# Patient Record
Sex: MALE | Race: WHITE | NOT HISPANIC OR LATINO | Employment: FULL TIME | URBAN - METROPOLITAN AREA
[De-identification: names, ages, dates, MRNs, and addresses within clinical notes are randomized per-mention and may not be internally consistent; named-entity substitution may affect disease eponyms.]

---

## 2022-08-13 ENCOUNTER — HOSPITAL ENCOUNTER (INPATIENT)
Facility: HOSPITAL | Age: 57
LOS: 12 days | Discharge: HOSPICE | DRG: 280 | End: 2022-08-25
Attending: EMERGENCY MEDICINE | Admitting: INTERNAL MEDICINE

## 2022-08-13 DIAGNOSIS — I50.9 ACUTE CONGESTIVE HEART FAILURE, UNSPECIFIED HEART FAILURE TYPE: ICD-10-CM

## 2022-08-13 DIAGNOSIS — A41.9 SEPSIS, DUE TO UNSPECIFIED ORGANISM, UNSPECIFIED WHETHER ACUTE ORGAN DYSFUNCTION PRESENT: ICD-10-CM

## 2022-08-13 DIAGNOSIS — E87.5 HYPERKALEMIA: ICD-10-CM

## 2022-08-13 DIAGNOSIS — I50.30 DIASTOLIC CHF: ICD-10-CM

## 2022-08-13 DIAGNOSIS — I26.99 PULMONARY EMBOLISM, UNSPECIFIED CHRONICITY, UNSPECIFIED PULMONARY EMBOLISM TYPE, UNSPECIFIED WHETHER ACUTE COR PULMONALE PRESENT: ICD-10-CM

## 2022-08-13 DIAGNOSIS — R60.0 BILATERAL LOWER EXTREMITY EDEMA: ICD-10-CM

## 2022-08-13 DIAGNOSIS — I82.409 DVT (DEEP VENOUS THROMBOSIS): ICD-10-CM

## 2022-08-13 DIAGNOSIS — I44.1 2ND DEGREE AV BLOCK: ICD-10-CM

## 2022-08-13 DIAGNOSIS — J18.9 PNEUMONIA OF RIGHT LOWER LOBE DUE TO INFECTIOUS ORGANISM: Primary | ICD-10-CM

## 2022-08-13 DIAGNOSIS — R07.9 CHEST PAIN: ICD-10-CM

## 2022-08-13 DIAGNOSIS — I50.9 CONGESTIVE HEART FAILURE: ICD-10-CM

## 2022-08-13 DIAGNOSIS — I49.3 PVC (PREMATURE VENTRICULAR CONTRACTION): ICD-10-CM

## 2022-08-13 DIAGNOSIS — R60.0 EDEMA OF RIGHT UPPER EXTREMITY: ICD-10-CM

## 2022-08-13 DIAGNOSIS — R60.1 ANASARCA: ICD-10-CM

## 2022-08-13 LAB
ALBUMIN SERPL-MCNC: 2.9 GM/DL (ref 3.5–5)
ALBUMIN/GLOB SERPL: 0.8 RATIO (ref 1.1–2)
ALP SERPL-CCNC: 85 UNIT/L (ref 40–150)
ALT SERPL-CCNC: 23 UNIT/L (ref 0–55)
AMMONIA PLAS-MSCNC: 26.9 UMOL/L (ref 18–72)
AMPHET UR QL SCN: NEGATIVE
APPEARANCE UR: ABNORMAL
APTT PPP: 31.9 SECONDS (ref 23.4–33.9)
AST SERPL-CCNC: 37 UNIT/L (ref 5–34)
BACTERIA #/AREA URNS AUTO: ABNORMAL /HPF
BARBITURATE SCN PRESENT UR: NEGATIVE
BASE EXCESS ARTERIAL: 3 MMOL/L (ref -2–2)
BASOPHILS # BLD AUTO: 0.02 X10(3)/MCL (ref 0–0.2)
BASOPHILS NFR BLD AUTO: 0.1 %
BENZODIAZ UR QL SCN: NEGATIVE
BILIRUB UR QL STRIP.AUTO: NEGATIVE MG/DL
BILIRUBIN DIRECT+TOT PNL SERPL-MCNC: 1.2 MG/DL
BNP BLD-MCNC: 3280.3 PG/ML
BUN SERPL-MCNC: 45 MG/DL (ref 8.4–25.7)
CALCIUM SERPL-MCNC: 8.9 MG/DL (ref 8.4–10.2)
CANNABINOIDS UR QL SCN: NEGATIVE
CHLORIDE SERPL-SCNC: 98 MMOL/L (ref 98–107)
CK SERPL-CCNC: 85 U/L (ref 30–200)
CO2 SERPL-SCNC: 28 MMOL/L (ref 22–29)
COCAINE UR QL SCN: NEGATIVE
COLOR UR AUTO: YELLOW
CREAT SERPL-MCNC: 1.36 MG/DL (ref 0.73–1.18)
D DIMER PPP IA.FEU-MCNC: 3.05 UG/ML FEU (ref 0–0.5)
EOSINOPHIL # BLD AUTO: 0 X10(3)/MCL (ref 0–0.9)
EOSINOPHIL NFR BLD AUTO: 0 %
ERYTHROCYTE [DISTWIDTH] IN BLOOD BY AUTOMATED COUNT: 13.7 % (ref 11.5–17)
ETHANOL SERPL-MCNC: <10 MG/DL
FLUAV AG UPPER RESP QL IA.RAPID: NOT DETECTED
FLUBV AG UPPER RESP QL IA.RAPID: NOT DETECTED
GFR SERPLBLD CREATININE-BSD FMLA CKD-EPI: 60 MLS/MIN/1.73/M2
GLOBULIN SER-MCNC: 3.8 GM/DL (ref 2.4–3.5)
GLUCOSE SERPL-MCNC: 136 MG/DL (ref 74–100)
GLUCOSE UR QL STRIP.AUTO: NEGATIVE MG/DL
HCO3 ARTERIAL: 29.4 MMOL/L (ref 18–23)
HCO3 UR-SCNC: 29.4 MMOL/L (ref 24–28)
HCT VFR BLD AUTO: 54.9 % (ref 42–52)
HGB BLD-MCNC: 17.6 GM/DL (ref 14–18)
HGB BLD-MCNC: ABNORMAL G/DL
HYALINE CASTS URNS QL MICRO: ABNORMAL /HPF
IMM GRANULOCYTES # BLD AUTO: 0.06 X10(3)/MCL (ref 0–0.04)
IMM GRANULOCYTES NFR BLD AUTO: 0.4 %
KETONES UR QL STRIP.AUTO: NEGATIVE MG/DL
LACTATE SERPL-SCNC: 3.6 MMOL/L (ref 0.5–2.2)
LEUKOCYTE ESTERASE UR QL STRIP.AUTO: NEGATIVE UNIT/L
LIPASE SERPL-CCNC: 17 U/L
LYMPHOCYTES # BLD AUTO: 1.58 X10(3)/MCL (ref 0.6–4.6)
LYMPHOCYTES NFR BLD AUTO: 10.7 %
MAGNESIUM SERPL-MCNC: 1.8 MG/DL (ref 1.6–2.6)
MCH RBC QN AUTO: 30.9 PG (ref 27–31)
MCHC RBC AUTO-ENTMCNC: 32.1 MG/DL (ref 33–36)
MCV RBC AUTO: 96.5 FL (ref 80–94)
MDMA UR QL SCN: NEGATIVE
MONOCYTES # BLD AUTO: 0.64 X10(3)/MCL (ref 0.1–1.3)
MONOCYTES NFR BLD AUTO: 4.4 %
NEUTROPHILS # BLD AUTO: 12.4 X10(3)/MCL (ref 2.1–9.2)
NEUTROPHILS NFR BLD AUTO: 84.4 %
NITRITE UR QL STRIP.AUTO: NEGATIVE
NRBC BLD AUTO-RTO: 0.1 %
OPIATES UR QL SCN: NEGATIVE
PCO2 BLDA: 59.6 MMHG (ref 35–45)
PCO2 BLDA: 59.6 MM[HG]
PCO2 BLDA: ABNORMAL MM[HG]
PCP UR QL: NEGATIVE
PH SMN: 7.3 [PH]
PH SMN: 7.3 [PH] (ref 7.35–7.45)
PH UR STRIP.AUTO: 5.5 [PH]
PH UR: 5.5 [PH] (ref 3–11)
PLATELET # BLD AUTO: 195 X10(3)/MCL (ref 130–400)
PMV BLD AUTO: 11.2 FL (ref 7.4–10.4)
PO2 BLDA: 94 MMHG (ref 80–100)
PO2 BLDA: 94 MM[HG]
POC BE: 3 MMOL/L
POC COHB: ABNORMAL
POC IONIZED CALCIUM: ABNORMAL
POC METHB: ABNORMAL
POC O2HB: ABNORMAL
POC SATURATED O2: 96 % (ref 95–100)
POC TCO2: 31 MMOL/L (ref 23–27)
POTASSIUM BLD-SCNC: ABNORMAL MMOL/L
POTASSIUM SERPL-SCNC: 6.1 MMOL/L (ref 3.5–5.1)
PROT SERPL-MCNC: 6.7 GM/DL (ref 6.4–8.3)
PROT UR QL STRIP.AUTO: 100 MG/DL
RBC # BLD AUTO: 5.69 X10(6)/MCL (ref 4.7–6.1)
RBC #/AREA URNS AUTO: ABNORMAL /HPF
RBC UR QL AUTO: ABNORMAL UNIT/L
SAMPLE: ABNORMAL
SARS-COV-2 RNA RESP QL NAA+PROBE: NOT DETECTED
SATURATED O2 ARTERIAL, I-STAT: 96
SODIUM BLD-SCNC: ABNORMAL MMOL/L
SODIUM SERPL-SCNC: 140 MMOL/L (ref 136–145)
SP GR UR STRIP.AUTO: 1.02
SPECIFIC GRAVITY, URINE AUTO (.000) (OHS): 1.02 (ref 1–1.03)
SPERM URNS QL MICRO: ABNORMAL /HPF
SQUAMOUS #/AREA URNS AUTO: ABNORMAL /HPF
TROPONIN I SERPL-MCNC: 0.13 NG/ML (ref 0–0.04)
TSH SERPL-ACNC: 4.41 UIU/ML (ref 0.35–4.94)
UROBILINOGEN UR STRIP-ACNC: 0.2 MG/DL
WBC # SPEC AUTO: 14.7 X10(3)/MCL (ref 4.5–11.5)
WBC #/AREA URNS AUTO: ABNORMAL /HPF

## 2022-08-13 PROCEDURE — 25000003 PHARM REV CODE 250: Performed by: EMERGENCY MEDICINE

## 2022-08-13 PROCEDURE — 82803 BLOOD GASES ANY COMBINATION: CPT

## 2022-08-13 PROCEDURE — 99900035 HC TECH TIME PER 15 MIN (STAT)

## 2022-08-13 PROCEDURE — 25000003 PHARM REV CODE 250: Performed by: INTERNAL MEDICINE

## 2022-08-13 PROCEDURE — 85730 THROMBOPLASTIN TIME PARTIAL: CPT | Performed by: EMERGENCY MEDICINE

## 2022-08-13 PROCEDURE — 25500020 PHARM REV CODE 255: Performed by: EMERGENCY MEDICINE

## 2022-08-13 PROCEDURE — 21400001 HC TELEMETRY ROOM

## 2022-08-13 PROCEDURE — 85379 FIBRIN DEGRADATION QUANT: CPT | Performed by: EMERGENCY MEDICINE

## 2022-08-13 PROCEDURE — 82140 ASSAY OF AMMONIA: CPT | Performed by: EMERGENCY MEDICINE

## 2022-08-13 PROCEDURE — 11000001 HC ACUTE MED/SURG PRIVATE ROOM

## 2022-08-13 PROCEDURE — 93010 EKG 12-LEAD: ICD-10-PCS | Mod: ,,, | Performed by: INTERNAL MEDICINE

## 2022-08-13 PROCEDURE — 63600175 PHARM REV CODE 636 W HCPCS: Performed by: INTERNAL MEDICINE

## 2022-08-13 PROCEDURE — 99285 EMERGENCY DEPT VISIT HI MDM: CPT | Mod: 25

## 2022-08-13 PROCEDURE — 85025 COMPLETE CBC W/AUTO DIFF WBC: CPT | Performed by: EMERGENCY MEDICINE

## 2022-08-13 PROCEDURE — 87636 SARSCOV2 & INF A&B AMP PRB: CPT | Performed by: EMERGENCY MEDICINE

## 2022-08-13 PROCEDURE — 93010 ELECTROCARDIOGRAM REPORT: CPT | Mod: ,,, | Performed by: INTERNAL MEDICINE

## 2022-08-13 PROCEDURE — 63600175 PHARM REV CODE 636 W HCPCS: Performed by: EMERGENCY MEDICINE

## 2022-08-13 PROCEDURE — 83880 ASSAY OF NATRIURETIC PEPTIDE: CPT | Performed by: EMERGENCY MEDICINE

## 2022-08-13 PROCEDURE — 36415 COLL VENOUS BLD VENIPUNCTURE: CPT | Performed by: EMERGENCY MEDICINE

## 2022-08-13 PROCEDURE — 81001 URINALYSIS AUTO W/SCOPE: CPT | Performed by: EMERGENCY MEDICINE

## 2022-08-13 PROCEDURE — 84443 ASSAY THYROID STIM HORMONE: CPT | Performed by: EMERGENCY MEDICINE

## 2022-08-13 PROCEDURE — 80307 DRUG TEST PRSMV CHEM ANLYZR: CPT | Performed by: EMERGENCY MEDICINE

## 2022-08-13 PROCEDURE — 82077 ASSAY SPEC XCP UR&BREATH IA: CPT | Performed by: EMERGENCY MEDICINE

## 2022-08-13 PROCEDURE — 93005 ELECTROCARDIOGRAM TRACING: CPT

## 2022-08-13 PROCEDURE — 94761 N-INVAS EAR/PLS OXIMETRY MLT: CPT

## 2022-08-13 PROCEDURE — 25000242 PHARM REV CODE 250 ALT 637 W/ HCPCS: Performed by: EMERGENCY MEDICINE

## 2022-08-13 PROCEDURE — 84484 ASSAY OF TROPONIN QUANT: CPT | Performed by: EMERGENCY MEDICINE

## 2022-08-13 PROCEDURE — 27000221 HC OXYGEN, UP TO 24 HOURS

## 2022-08-13 PROCEDURE — 87040 BLOOD CULTURE FOR BACTERIA: CPT | Performed by: EMERGENCY MEDICINE

## 2022-08-13 PROCEDURE — 82550 ASSAY OF CK (CPK): CPT | Performed by: EMERGENCY MEDICINE

## 2022-08-13 PROCEDURE — 83690 ASSAY OF LIPASE: CPT | Performed by: EMERGENCY MEDICINE

## 2022-08-13 PROCEDURE — 83735 ASSAY OF MAGNESIUM: CPT | Performed by: EMERGENCY MEDICINE

## 2022-08-13 PROCEDURE — 94640 AIRWAY INHALATION TREATMENT: CPT

## 2022-08-13 PROCEDURE — 36600 WITHDRAWAL OF ARTERIAL BLOOD: CPT

## 2022-08-13 PROCEDURE — 83605 ASSAY OF LACTIC ACID: CPT | Performed by: EMERGENCY MEDICINE

## 2022-08-13 PROCEDURE — 80053 COMPREHEN METABOLIC PANEL: CPT | Performed by: EMERGENCY MEDICINE

## 2022-08-13 RX ORDER — IPRATROPIUM BROMIDE AND ALBUTEROL SULFATE 2.5; .5 MG/3ML; MG/3ML
3 SOLUTION RESPIRATORY (INHALATION) EVERY 4 HOURS PRN
Status: DISCONTINUED | OUTPATIENT
Start: 2022-08-13 | End: 2022-08-26 | Stop reason: HOSPADM

## 2022-08-13 RX ORDER — FAMOTIDINE 20 MG/1
20 TABLET, FILM COATED ORAL 2 TIMES DAILY
Status: DISCONTINUED | OUTPATIENT
Start: 2022-08-13 | End: 2022-08-26 | Stop reason: HOSPADM

## 2022-08-13 RX ORDER — SODIUM CHLORIDE 0.9 % (FLUSH) 0.9 %
10 SYRINGE (ML) INJECTION
Status: DISCONTINUED | OUTPATIENT
Start: 2022-08-13 | End: 2022-08-26 | Stop reason: HOSPADM

## 2022-08-13 RX ORDER — FUROSEMIDE 10 MG/ML
20 INJECTION INTRAMUSCULAR; INTRAVENOUS EVERY 6 HOURS
Status: DISCONTINUED | OUTPATIENT
Start: 2022-08-13 | End: 2022-08-17

## 2022-08-13 RX ORDER — ENOXAPARIN SODIUM 100 MG/ML
40 INJECTION SUBCUTANEOUS EVERY 24 HOURS
Status: DISCONTINUED | OUTPATIENT
Start: 2022-08-13 | End: 2022-08-13

## 2022-08-13 RX ORDER — ENOXAPARIN SODIUM 100 MG/ML
60 INJECTION SUBCUTANEOUS
Status: DISCONTINUED | OUTPATIENT
Start: 2022-08-13 | End: 2022-08-14

## 2022-08-13 RX ORDER — SODIUM CHLORIDE 9 MG/ML
1000 INJECTION, SOLUTION INTRAVENOUS
Status: DISCONTINUED | OUTPATIENT
Start: 2022-08-13 | End: 2022-08-13

## 2022-08-13 RX ORDER — ASPIRIN 325 MG
325 TABLET, DELAYED RELEASE (ENTERIC COATED) ORAL DAILY
Status: DISCONTINUED | OUTPATIENT
Start: 2022-08-14 | End: 2022-08-15

## 2022-08-13 RX ORDER — SODIUM CHLORIDE 9 MG/ML
1000 INJECTION, SOLUTION INTRAVENOUS
Status: COMPLETED | OUTPATIENT
Start: 2022-08-13 | End: 2022-08-13

## 2022-08-13 RX ORDER — ASPIRIN 325 MG
325 TABLET, DELAYED RELEASE (ENTERIC COATED) ORAL
Status: COMPLETED | OUTPATIENT
Start: 2022-08-13 | End: 2022-08-13

## 2022-08-13 RX ORDER — FUROSEMIDE 10 MG/ML
20 INJECTION INTRAMUSCULAR; INTRAVENOUS EVERY 8 HOURS
Status: DISCONTINUED | OUTPATIENT
Start: 2022-08-13 | End: 2022-08-13

## 2022-08-13 RX ORDER — LEVOFLOXACIN 5 MG/ML
750 INJECTION, SOLUTION INTRAVENOUS
Status: DISCONTINUED | OUTPATIENT
Start: 2022-08-13 | End: 2022-08-15

## 2022-08-13 RX ORDER — IPRATROPIUM BROMIDE AND ALBUTEROL SULFATE 2.5; .5 MG/3ML; MG/3ML
3 SOLUTION RESPIRATORY (INHALATION)
Status: COMPLETED | OUTPATIENT
Start: 2022-08-13 | End: 2022-08-13

## 2022-08-13 RX ORDER — TALC
6 POWDER (GRAM) TOPICAL NIGHTLY PRN
Status: DISCONTINUED | OUTPATIENT
Start: 2022-08-13 | End: 2022-08-26 | Stop reason: HOSPADM

## 2022-08-13 RX ORDER — FUROSEMIDE 10 MG/ML
20 INJECTION INTRAMUSCULAR; INTRAVENOUS
Status: COMPLETED | OUTPATIENT
Start: 2022-08-13 | End: 2022-08-13

## 2022-08-13 RX ORDER — PROMETHAZINE HYDROCHLORIDE 25 MG/1
25 TABLET ORAL EVERY 6 HOURS PRN
Status: DISCONTINUED | OUTPATIENT
Start: 2022-08-13 | End: 2022-08-26 | Stop reason: HOSPADM

## 2022-08-13 RX ORDER — ACETAMINOPHEN 325 MG/1
650 TABLET ORAL EVERY 4 HOURS PRN
Status: DISCONTINUED | OUTPATIENT
Start: 2022-08-13 | End: 2022-08-26 | Stop reason: HOSPADM

## 2022-08-13 RX ADMIN — LEVOFLOXACIN 750 MG: 750 INJECTION, SOLUTION INTRAVENOUS at 02:08

## 2022-08-13 RX ADMIN — FAMOTIDINE 20 MG: 20 TABLET, FILM COATED ORAL at 08:08

## 2022-08-13 RX ADMIN — FUROSEMIDE 20 MG: 10 INJECTION, SOLUTION INTRAMUSCULAR; INTRAVENOUS at 02:08

## 2022-08-13 RX ADMIN — FUROSEMIDE 20 MG: 10 INJECTION, SOLUTION INTRAMUSCULAR; INTRAVENOUS at 06:08

## 2022-08-13 RX ADMIN — SODIUM POLYSTYRENE SULFONATE 15 G: 15 SUSPENSION ORAL; RECTAL at 02:08

## 2022-08-13 RX ADMIN — SODIUM CHLORIDE 1000 ML: 9 INJECTION, SOLUTION INTRAVENOUS at 01:08

## 2022-08-13 RX ADMIN — IPRATROPIUM BROMIDE AND ALBUTEROL SULFATE 3 ML: 2.5; .5 SOLUTION RESPIRATORY (INHALATION) at 01:08

## 2022-08-13 RX ADMIN — IOPAMIDOL 100 ML: 755 INJECTION, SOLUTION INTRAVENOUS at 03:08

## 2022-08-13 RX ADMIN — ENOXAPARIN SODIUM 60 MG: 100 INJECTION SUBCUTANEOUS at 04:08

## 2022-08-13 RX ADMIN — ASPIRIN 325 MG: 325 TABLET, COATED ORAL at 02:08

## 2022-08-13 NOTE — ED PROVIDER NOTES
Encounter Date: 2022       History     Chief Complaint   Patient presents with    Shortness of Breath     Pt to er via aasi with c/o sob and swelling to extremities.     56-year-old male with a history of COPD complains of shortness of breath and swelling to right upper extremity and bilateral lower extremities for the last 3 days. He also has scrotal and abdominal swelling.  He states that he sleeps on his right side and thought that may be why he is so swollen on the right. He has profuse diarrhea and loses control of bowels and urine if he moves around.  Paramedics reported O2 sat of 88% on room air with respiratory distress on arrival, now on BiPAP with O2 sat 96%.  He has no chest pain or abdominal pain.  He reports early satiety and weight loss.  He states he was a heavy drinker and heavy smoker and quit 2 months ago. No PCP.        Review of patient's allergies indicates:  No Known Allergies  Past Medical History:   Diagnosis Date    COPD (chronic obstructive pulmonary disease)      Past Surgical History:   Procedure Laterality Date    MANDIBLE FRACTURE SURGERY       Family History   Problem Relation Age of Onset    Cancer Mother     Cancer Brother      Social History     Tobacco Use    Smoking status: Former Smoker     Packs/day: 2.00     Quit date: 2022     Years since quittin.1    Smokeless tobacco: Never Used   Substance Use Topics    Alcohol use: Not Currently     Comment: quit 2 months ago    Drug use: Never     Review of Systems   Constitutional: Positive for appetite change.   Respiratory: Positive for shortness of breath and wheezing.    Gastrointestinal: Positive for diarrhea.   Genitourinary: Positive for scrotal swelling.   Musculoskeletal:        Extremity swelling, severe right upper extremity and right lower extremity, moderate left lower extremity, pitting edema noted.   All other systems reviewed and are negative.      Physical Exam     Initial Vitals   BP Pulse  Resp Temp SpO2   08/13/22 1236 08/13/22 1236 08/13/22 1236 08/13/22 1232 08/13/22 1236   (!) 138/98 105 (!) 25 97.2 °F (36.2 °C) 95 %      MAP       --                Physical Exam    Nursing note and vitals reviewed.  Constitutional: Vital signs are normal.   Chronically ill-appearing, cachectic, moderate respiratory distress on arrival on BiPAP   HENT:   Head: Normocephalic and atraumatic.   Eyes: Pupils are equal, round, and reactive to light.   Neck: Neck supple.   Cardiovascular: Normal rate, regular rhythm and normal heart sounds.   Pulmonary/Chest: He is in respiratory distress.   Decreased breath sounds bilaterally but no wheezing   Abdominal:   Soft, mildly distended, no tenderness, rebound, guarding.   Genitourinary:    Genitourinary Comments: Scrotal swelling noted     Musculoskeletal:      Cervical back: Neck supple. No edema or erythema.      Comments: Extensive swelling noted to the right upper extremity which is pitting edema, and pinning edema to bilateral lower extremities right greater than left.     Lymphadenopathy:     He has no cervical adenopathy.   Neurological: He is alert.   Skin: Skin is warm and dry. Capillary refill takes less than 2 seconds.         ED Course   Procedures  Labs Reviewed   B-TYPE NATRIURETIC PEPTIDE - Abnormal; Notable for the following components:       Result Value    Natriuretic Peptide 3,280.3 (*)     All other components within normal limits   CBC WITH DIFFERENTIAL - Abnormal; Notable for the following components:    WBC 14.7 (*)     Hct 54.9 (*)     MCV 96.5 (*)     MCHC 32.1 (*)     MPV 11.2 (*)     Neut # 12.4 (*)     IG# 0.06 (*)     All other components within normal limits   TROPONIN I - Abnormal; Notable for the following components:    Troponin-I 0.134 (*)     All other components within normal limits   LACTIC ACID, PLASMA - Abnormal; Notable for the following components:    Lactic Acid Level 3.6 (*)     All other components within normal limits    COMPREHENSIVE METABOLIC PANEL - Abnormal; Notable for the following components:    Potassium Level 6.1 (*)     Glucose Level 136 (*)     Blood Urea Nitrogen 45.0 (*)     Creatinine 1.36 (*)     Albumin Level 2.9 (*)     Globulin 3.8 (*)     Albumin/Globulin Ratio 0.8 (*)     Aspartate Aminotransferase 37 (*)     All other components within normal limits   URINALYSIS, REFLEX TO URINE CULTURE - Abnormal; Notable for the following components:    Appearance, UA SL CLOUDY (*)     Protein,   (*)     Blood, UA Moderate (*)     All other components within normal limits   D DIMER, QUANTITATIVE - Abnormal; Notable for the following components:    D-Dimer 3.05 (*)     All other components within normal limits   URINALYSIS, MICROSCOPIC - Abnormal; Notable for the following components:    Bacteria, UA Few (*)     Hyaline Casts, UA Few (*)     Sperm, UA Few (*)     WBC, UA 11-20 (*)     All other components within normal limits   ISTAT PROCEDURE - Abnormal; Notable for the following components:    POC PH 7.302 (*)     POC PCO2 59.6 (*)     POC HCO3 29.4 (*)     POC TCO2 31 (*)     All other components within normal limits   APTT - Normal   LIPASE - Normal   MAGNESIUM - Normal   COVID/FLU A&B PCR - Normal   ALCOHOL,MEDICAL (ETHANOL) - Normal   TSH - Normal   AMMONIA - Normal   CK - Normal   BLOOD CULTURE OLG   BLOOD CULTURE OLG   CLOSTRIDIUM DIFFICILE TOXIN A AND B, EIA   STOOL CULTURE OLG   CULTURE, URINE   DRUG SCREEN, URINE (BEAKER)   LACTIC ACID, PLASMA   CK-MB     EKG Readings: (Independently Interpreted)   Initial Reading: No STEMI. Rhythm: Sinus Tachycardia. Heart Rate: 113. Ectopy: No Ectopy. Conduction: LBBB. ST Segments: Normal ST Segments. T Waves: Normal. Clinical Impression: Sinus Tachycardia       Imaging Results          CTA Chest Abdomen Non Coronary (Final result)  Result time 08/13/22 15:55:52   Procedure changed from CTA Chest Non-Coronary (PE Study)     Final result by Basilio De Oliveira MD (08/13/22  15:55:52)                 Impression:      Left lower lobe pulmonary thromboembolism is noted.    Large right pleural effusion is present.    Findings of anasarca.    The bladder wall is prominent.  Correlate with urinalysis.    There are cortical defects of the bilateral kidneys likely related to chronic renal disease and scarring, however infarcts are less likely.    The liver appears heterogeneous which may be related to phase of contrast however is cirrhosis is not excluded.  Cardiomegaly is present.  Further evaluation may be obtained with ECHO.    Findings reported to Dr. Little prior to interpretation.      Electronically signed by: Basilio De Oliveira  Date:    08/13/2022  Time:    15:55             Narrative:    EXAMINATION:  CTA CHEST ABDOMEN NON CORONARY (XPD)    CLINICAL HISTORY:  Pulmonary embolism (PE) suspected, positive D-dimer;    TECHNIQUE:  Axial CTA images of the chest, abdomen, and pelvis were obtained With Contrast. Sagittal and coronal reconstructed images were available for review.    Automatic exposure control was utilized to reduce the patient's radiation dose.    DLP = 582    COMPARISON:  No prior images available for comparison.    FINDINGS:  PULMONARY ARTERY: Thrombus is identified in the left lower lobe pulmonary artery.    AORTA: The thoracoabdominal aorta is normal in course and caliber. Scattered atherosclerotic disease is noted.    HEART: The heart is enlarged.  No pericardial effusion.    THYROID GLAND: The thyroid is not enlarged. There are no nodules identified.    AIRWAYS: Trachea is midline and tracheobronchial tree is patent.    LUNGS: Large right effusion with subsegmental atelectatic changes at the right base.  Emphysematous changes throughout the lungs.    THROACIC LYMPH NODES: There is no significant mediastinal, axillary or hilar lymphadenopathy.    HEPATOBILIARY: Somewhat nodular contour of the superior aspect of the liver with some heterogeneity may be related to phase of  contrast versus cirrhosis.  Correlate with patient's history.  The gallbladder is normal.    SPLEEN: Normal    PANCREAS: No focal masses or ductal dilatation.    ADRENALS: No adrenal nodules.    KIDNEYS: No evidence of hydronephrosis.  Bilateral renal cortical perfusional defects likely related to scarring in chronic kidney disease.  Correlate with patient's history.  Less likely infarcts.    ABDOMINAL LYMPHADENOPATHY/RETROPERITONEUM: There is no retroperitoneal lymphadenopathy.    BOWEL: No acute bowel related abnormalities.    PELVIC VISCERA: The bladder wall is somewhat prominent.  Correlate with urinalysis.    PELVIC LYMPH NODES: No lymphadenopathy.    PERITONEUM/ BODY WALL: Diffuse body wall edema with moderate ascites noted.    SKELETAL: No aggressive appearing lytic/blastic lesion. No acute fractures, subluxations or dislocations.                               X-Ray Chest 1 View (Final result)  Result time 08/13/22 12:44:56    Final result by Basilio De Oliveira MD (08/13/22 12:44:56)                 Impression:      Right greater than left pleural effusion with possible right lower lobe opacification.  Underlying infectious process is not excluded.  Recommend continued follow-up.      Electronically signed by: Basilio De Oliveira  Date:    08/13/2022  Time:    12:44             Narrative:    EXAMINATION:  XR CHEST 1 VIEW    CLINICAL HISTORY:  shortness of breath;    TECHNIQUE:  Single view of the chest    COMPARISON:  No prior imaging available for comparison.    FINDINGS:  Right greater than left pleural effusion with possible right lower lobe opacification.  Underlying infectious process is not excluded.  Recommend continued follow-up.                                 Medications   levoFLOXacin 750 mg/150 mL IVPB 750 mg (0 mg Intravenous Stopped 8/13/22 1601)   sodium chloride 0.9% flush 10 mL (has no administration in time range)   sodium chloride 0.9% flush 10 mL (has no administration in time range)    acetaminophen tablet 650 mg (has no administration in time range)   famotidine tablet 20 mg (has no administration in time range)   promethazine tablet 25 mg (has no administration in time range)   aspirin EC tablet 325 mg (has no administration in time range)   melatonin tablet 6 mg (has no administration in time range)   furosemide injection 20 mg (has no administration in time range)   albuterol-ipratropium 2.5 mg-0.5 mg/3 mL nebulizer solution 3 mL (has no administration in time range)   enoxaparin injection 60 mg (60 mg Subcutaneous Given 8/13/22 1600)   albuterol-ipratropium 2.5 mg-0.5 mg/3 mL nebulizer solution 3 mL (3 mLs Nebulization Given 8/13/22 1310)   0.9%  NaCl infusion (0 mLs Intravenous Stopped 8/13/22 1549)   aspirin EC tablet 325 mg (325 mg Oral Given 8/13/22 1431)   furosemide injection 20 mg (20 mg Intravenous Given 8/13/22 1430)   sodium polystyrene 15 gram/60 mL suspension 15 g (15 g Oral Given 8/13/22 1436)   iopamidoL (ISOVUE-370) injection 100 mL (100 mLs Intravenous Given 8/13/22 1535)     Medical Decision Making:   Other:   I discussed test(s) with the performing physician.       <> Summary of the Findings: Case discussed with Milena VIDAL on call for the Hospitalist Service.  Ok to admit to Dr Fry and will ask Dr Barker to see him here.  Case discussed with Dr Barker hospitalist here and he will see the pt in the ER prior to transfer if he has time before bed is assigned.             ED Course as of 08/13/22 1634   Sat Aug 13, 2022   1338 Lactic acid elevated at 3.6.  2 liters of NS ordered and antibiotics ordered.  [SH]   1346 Patient is now on OxyMask at 10 L with O2 sat 92-94%.  He states he is feeling much better and no longer has respiratory distress after receiving of albuterol and Atrovent nebulizer treatment. [SH]   1447 Milena VIDAL has called back requesting that we put Mr. Rodas back on BiPAP due to pH of 7.3 and pCO2 of 59. She is also requesting that we try again to get the CT PE  protocol prior to transfer.  The BiPAP machine cannot go in the room with the CT so we will put him on 100% FiO2 briefly to try to get the CT.  Respiratory will set up the BiPAP in the room and patient is willing to try BiPAP again. He has mild increased work of breathing with clear lungs at this time, O2sat 96% on oxymask at 7 liters. No fatigue or distress and he is very talkative. He was on BiPAP on arrival per [place] and he said it helped him, but he did not want to stay on it because he wanted to talk and could not talk easily with the BiPAP on. [SH]   1541 Respiratory therapist accompanied Mr. Rodas to the CT scanner and he tolerated transfer from the stretcher to the CT bed with no difficulty, did not develop respiratory distress this time when he laid flat for the CT.  RT did not put him on Bipap. On re-evaluation after returning for CT, states he feels fine, mild increased work of breathing and mild tachypnea but he is very talkative, and says he feels well.  He did not need the BiPAP to tolerate the CT scan and is not on BiPAP right now. [SH]   1549 Discussed BiPap issue with Dr Braker and he did admit Hand P and agrees that BiPap is not needed right now since pt tolerated lying flat for Ct scan.   [SH]   1553 Discussed respiratory status with Milena nurse practitioner and also finding of pulmonary embolism on CT scan.  We will give the patient a dose of Lovenox here prior to transfer. []      ED Course User Index  [SH] Yuni Little MD             Clinical Impression:   Final diagnoses:  [R07.9] Chest pain  [J18.9] Pneumonia of right lower lobe due to infectious organism (Primary)  [A41.9] Sepsis, due to unspecified organism, unspecified whether acute organ dysfunction present  [R60.1] Anasarca  [I50.9] Acute congestive heart failure, unspecified heart failure type  [E87.5] Hyperkalemia  [I26.99] Pulmonary embolism, unspecified chronicity, unspecified pulmonary embolism type, unspecified whether  acute cor pulmonale present  [R60.0] Edema of right upper extremity  [R60.0] Bilateral lower extremity edema          ED Disposition Condition    Transfer to Another Facility Stable              Yuni Little MD  08/13/22 1628       Yuni Little MD  08/13/22 1632       Yuni Little MD  08/13/22 [id number]

## 2022-08-13 NOTE — H&P
"[facility] EMERGENCY DEPARTMENT    Hospital Medicine History & Physical Examination       Patient Name: Harpreet Rodas  MRN: [id number]  Patient Class: IP- Inpatient   Admission Date: 8/13/2022   Admitting Physician: ALEIDA Service   Length of Stay: 0  Attending Physician: Reginald Barker MD  Primary Care Provider: Primary Doctor No  Face-to-Face encounter date: 08/13/2022    Code Status: Full Code    Chief Complaint: Shortness of Breath (Pt to er via aasi with c/o sob and swelling to extremities.)          HISTORY OF PRESENT ILLNESS:   Harpreet Rodas is a 56 y.o. male who  has a past medical history of COPD (chronic obstructive pulmonary disease).. The patient presented to [facility] on 8/13/2022 with a primary complaint of dyspnea on exertion, edema and chest pain.     The pt says for about 6 months he has had worsening dyspnea on exertion. Currently, just walking in his living room make him extremely short of breath. He started having swelling of his right arm and both legs 3 days ago. He started having diarrhea at the same time. He has lost weight. He says he gets hungry but feels full after a few bites and the food goes right through him. He came to the ER he began having left side chest pain, "a sticking sensation". He denies associated symptoms it only lasts a short time. No radiation. No known hx of CAD. Denies fever, chills or sweats. He quit smoking and drinking about 2 months ago after his wife had pneumonia and his breathing was worsening. He was drinking 4-5 40oz beers a week prior to quitting. The pt became very short of breath when trying to get into wheelchair for chest CT. However, he was able to tolerate CT and lay flat when transported by bed. He was given lasix prior to my arrival. He says he is feeling better. He is not on home oxygen. Only home med is an inhaler.    PAST MEDICAL HISTORY:     Past Medical History:   Diagnosis Date    COPD (chronic obstructive pulmonary " disease)        PAST SURGICAL HISTORY:     Past Surgical History:   Procedure Laterality Date    MANDIBLE FRACTURE SURGERY         ALLERGIES:   Patient has no known allergies.    FAMILY HISTORY:   family history includes Cancer in his brother and mother.    SOCIAL HISTORY:     Social History     Tobacco Use    Smoking status: Former Smoker     Packs/day: 2.00     Quit date: 2022     Years since quittin.1    Smokeless tobacco: Never Used   Substance Use Topics    Alcohol use: Not Currently     Comment: quit 2 months ago        HOME MEDICATIONS:     Prior to Admission medications    Medication Sig Start Date End Date Taking? Authorizing Provider   albuterol sulfate (INV ALBUTEROL) 90 mcg inhalation Inhale into the lungs as needed. Take one puff by mouth as directed by Physician.    Historical Provider       REVIEW OF SYSTEMS:   Except as documented, all other systems reviewed and negative   Review of Systems   Constitutional: Positive for malaise/fatigue and weight loss.   Respiratory: Positive for shortness of breath. Negative for cough.    Cardiovascular: Positive for chest pain and leg swelling.   Gastrointestinal: Positive for abdominal pain and diarrhea. Negative for blood in stool and melena.   Neurological: Positive for dizziness and weakness.         PHYSICAL EXAM:     VITAL SIGNS: 24 HRS MIN & MAX LAST   Temp  Min: 97.2 °F (36.2 °C)  Max: 98.4 °F (36.9 °C) 98.2 °F (36.8 °C)   BP  Min: 122/96  Max: 138/98 (!) 126/92   Pulse  Min: 102  Max: 105  102   Resp  Min: 25  Max: 36 (!) 28   SpO2  Min: 95 %  Max: 98 % 98 %       General appearance: chronically ill appearing male in no apparent distress.  HENT: Atraumatic head. Moist mucous membranes of oral cavity.  Eyes: Normal extraocular movements.   Neck: Supple. No LAD  Lungs: Wearing oxygen. Clear to auscultation bilaterally. No wheezing present.   Heart: Regular rate and rhythm. S1 and S2 present with no murmurs/gallop/rub. No JVD present. Anasarca  with dependent edema to his chest. 3+ edema RUE and B/L LE, scrotal edema  Abdomen: Soft, non-distended, mild tender in b/l flanks. No rebound tenderness/guarding.   Extremities: No cyanosis, clubbing  Skin: No Rash.   Neuro: Motor and sensory exams grossly intact. Good tone. No focal defecits  Psych/mental status: Appropriate mood and affect. Responds appropriately to questions.     LABS AND IMAGING:     Recent Labs   Lab 08/13/22  1255   WBC 14.7*   RBC 5.69   HGB 17.6   HCT 54.9*   MCV 96.5*   MCH 30.9   MCHC 32.1*   RDW 13.7      MPV 11.2*       Recent Labs   Lab 08/13/22  1247 08/13/22  1247 08/13/22  1255   NA  --   --  140   K  --   --  6.1*   CO2  --   --  28   BUN  --   --  45.0*   CREATININE  --   --  1.36*   CALCIUM  --   --  8.9   PH 7.302 7.302*  --    MG  --   --  1.80   ALBUMIN  --   --  2.9*   ALKPHOS  --   --  85   ALT  --   --  23   AST  --   --  37*   BILITOT  --   --  1.2     Recent Results (from the past 24 hour(s))   POCT ARTERIAL BLOOD GAS Blood Gas    Collection Time: 08/13/22 12:47 PM   Result Value Ref Range    POC PH 7.302     POC PCO2 59.6     POC PO2 94     POC Sodium      POC Potassium      POC Ionized Calcium      POC HEMOGLOBIN      POC O2Hb      POC COHb      POC MetHb      POC PCO2      Base Excess, Arterial 3.0 (A) -2.0 - 2.0 mmol/L    O2 Sat, Art 96     HCO3, Arterial 29.4 (A) 18.0 - 23.0 MMOL/L   ISTAT PROCEDURE    Collection Time: 08/13/22 12:47 PM   Result Value Ref Range    POC PH 7.302 (L) 7.35 - 7.45    POC PCO2 59.6 (HH) 35 - 45 mmHg    POC PO2 94 80 - 100 mmHg    POC HCO3 29.4 (H) 24 - 28 mmol/L    POC BE 3 -2 to 2 mmol/L    POC SATURATED O2 96 95 - 100 %    POC TCO2 31 (H) 23 - 27 mmol/L    Sample ARTERIAL    COVID/FLU A&B PCR    Collection Time: 08/13/22 12:54 PM   Result Value Ref Range    Influenza A PCR Not Detected Not Detected    Influenza B PCR Not Detected Not Detected    SARS-CoV-2 PCR Not Detected Not Detected   BNP    Collection Time: 08/13/22 12:55 PM    Result Value Ref Range    Natriuretic Peptide 3,280.3 (H) <=100.0 pg/mL   CBC with Differential    Collection Time: 08/13/22 12:55 PM   Result Value Ref Range    WBC 14.7 (H) 4.5 - 11.5 x10(3)/mcL    RBC 5.69 4.70 - 6.10 x10(6)/mcL    Hgb 17.6 14.0 - 18.0 gm/dL    Hct 54.9 (H) 42.0 - 52.0 %    MCV 96.5 (H) 80.0 - 94.0 fL    MCH 30.9 27.0 - 31.0 pg    MCHC 32.1 (L) 33.0 - 36.0 mg/dL    RDW 13.7 11.5 - 17.0 %    Platelet 195 130 - 400 x10(3)/mcL    MPV 11.2 (H) 7.4 - 10.4 fL    Neut % 84.4 %    Lymph % 10.7 %    Mono % 4.4 %    Eos % 0.0 %    Basophil % 0.1 %    Lymph # 1.58 0.6 - 4.6 x10(3)/mcL    Neut # 12.4 (H) 2.1 - 9.2 x10(3)/mcL    Mono # 0.64 0.1 - 1.3 x10(3)/mcL    Eos # 0.00 0 - 0.9 x10(3)/mcL    Baso # 0.02 0 - 0.2 x10(3)/mcL    IG# 0.06 (H) 0 - 0.04 x10(3)/mcL    IG% 0.4 %    NRBC% 0.1 %   Troponin I    Collection Time: 08/13/22 12:55 PM   Result Value Ref Range    Troponin-I 0.134 (H) 0.000 - 0.045 ng/mL   APTT    Collection Time: 08/13/22 12:55 PM   Result Value Ref Range    PTT 31.9 23.4 - 33.9 seconds   Lipase    Collection Time: 08/13/22 12:55 PM   Result Value Ref Range    Lipase Level 17 <=60 U/L   Magnesium    Collection Time: 08/13/22 12:55 PM   Result Value Ref Range    Magnesium Level 1.80 1.60 - 2.60 mg/dL   Lactic Acid, Plasma    Collection Time: 08/13/22 12:55 PM   Result Value Ref Range    Lactic Acid Level 3.6 (HH) 0.5 - 2.2 mmol/L   Comprehensive Metabolic Panel    Collection Time: 08/13/22 12:55 PM   Result Value Ref Range    Sodium Level 140 136 - 145 mmol/L    Potassium Level 6.1 (H) 3.5 - 5.1 mmol/L    Chloride 98 98 - 107 mmol/L    Carbon Dioxide 28 22 - 29 mmol/L    Glucose Level 136 (H) 74 - 100 mg/dL    Blood Urea Nitrogen 45.0 (H) 8.4 - 25.7 mg/dL    Creatinine 1.36 (H) 0.73 - 1.18 mg/dL    Calcium Level Total 8.9 8.4 - 10.2 mg/dL    Protein Total 6.7 6.4 - 8.3 gm/dL    Albumin Level 2.9 (L) 3.5 - 5.0 gm/dL    Globulin 3.8 (H) 2.4 - 3.5 gm/dL    Albumin/Globulin Ratio 0.8 (L) 1.1  - 2.0 ratio    Bilirubin Total 1.2 <=1.5 mg/dL    Alkaline Phosphatase 85 40 - 150 unit/L    Alanine Aminotransferase 23 0 - 55 unit/L    Aspartate Aminotransferase 37 (H) 5 - 34 unit/L    eGFR 60 mls/min/1.73/m2   D-Dimer, Quantitative    Collection Time: 08/13/22 12:55 PM   Result Value Ref Range    D-Dimer 3.05 (H) 0.00 - 0.50 ug/mL FEU   Ethanol    Collection Time: 08/13/22 12:55 PM   Result Value Ref Range    Ethanol Level <10.0 <=10.0 mg/dL   TSH    Collection Time: 08/13/22 12:55 PM   Result Value Ref Range    Thyroid Stimulating Hormone 4.4097 0.3500 - 4.9400 uIU/mL   CK    Collection Time: 08/13/22 12:55 PM   Result Value Ref Range    Creatine Kinase 85 30 - 200 U/L   Ammonia    Collection Time: 08/13/22  1:00 PM   Result Value Ref Range    Ammonia Level 26.9 18.0 - 72.0 umol/L       Microbiology Results (last 7 days)     Procedure Component Value Units Date/Time    Clostridium Diff Toxin, A & B, EIA [[id number]]     Order Status: Sent Specimen: Stool     Stool Culture **CANNOT BE ORDERED STAT** [[id number]]     Order Status: Sent Specimen: Stool     Blood Culture [[id number]] Collected: 08/13/22 1314    Order Status: Sent Specimen: Blood from Arm, Left Updated: 08/13/22 1314    Blood Culture [[id number]] Collected: 08/13/22 1314    Order Status: Sent Specimen: Blood from Hand, Left Updated: 08/13/22 1314           X-Ray Chest 1 View  Narrative: EXAMINATION:  XR CHEST 1 VIEW    CLINICAL HISTORY:  shortness of breath;    TECHNIQUE:  Single view of the chest    COMPARISON:  No prior imaging available for comparison.    FINDINGS:  Right greater than left pleural effusion with possible right lower lobe opacification.  Underlying infectious process is not excluded.  Recommend continued follow-up.  Impression: Right greater than left pleural effusion with possible right lower lobe opacification.  Underlying infectious process is not excluded.  Recommend continued follow-up.    Electronically signed by: Basilio  Yennifer  Date:    08/13/2022  Time:    12:44        __________________________________________________________________________  INPATIENT LIST OF MEDICATIONS     Scheduled Meds:   [START ON 8/14/2022] aspirin  325 mg Oral Daily    enoxaparin  40 mg Subcutaneous Daily    famotidine  20 mg Oral BID    furosemide (LASIX) injection  20 mg Intravenous Q6H    levoFLOXacin  750 mg Intravenous Q24H     Continuous Infusions:  PRN Meds:acetaminophen, albuterol-ipratropium, melatonin, promethazine, sodium chloride 0.9%, sodium chloride 0.9%          ASSESSMENT & PLAN:       Anasarca  Chest pain  Right pleural effusion  Suspected RLL pneumonia  Dyspnea on exertion  Diarrhea  B/L LE edema  RUE edema  Hyperkalemia  Lactic acidosis  ANNETTA     Plan  CTA chest to rule out PE and eval pleural effusion vs pneumonia  Follow cardiac enzymes  Cont lasix  Repeat labs in am  Obtain 2d echo  Consider abd imaging  Send stool for cdiff  Cont empiric abx w/ Levaquin  Use bipap PRN  Consult pulmonary to eval for right thoracentesis    Critical care time >30 mins      Reginald Barker MD   08/13/2022

## 2022-08-13 NOTE — Clinical Note
Diagnosis: Pneumonia of right lower lobe due to infectious organism [[id number]]   Admitting Provider:: YOCASTA BULL [[id number]]   Future Attending Provider: YOCASTA BULL [[id number]]   Reason for IP Medical Treatment  (Clinical interventions that can only be accomplished in the IP setting? ) :: Pneumonia   Estimated Length of Stay:: 5+ midnights   I certify that Inpatient services for greater than or equal to 2 midnights are medically necessary:: Yes   Plans for Post-Acute care--if anticipated (pick the single best option):: C. Discharge home with home health services

## 2022-08-14 LAB
ALBUMIN SERPL-MCNC: 2.6 GM/DL (ref 3.5–5)
ALBUMIN/GLOB SERPL: 0.9 RATIO (ref 1.1–2)
ALP SERPL-CCNC: 69 UNIT/L (ref 40–150)
ALT SERPL-CCNC: 21 UNIT/L (ref 0–55)
AST SERPL-CCNC: 25 UNIT/L (ref 5–34)
BILIRUBIN DIRECT+TOT PNL SERPL-MCNC: 0.7 MG/DL
BNP BLD-MCNC: 3691.5 PG/ML
BUN SERPL-MCNC: 43.2 MG/DL (ref 8.4–25.7)
CALCIUM SERPL-MCNC: 8.6 MG/DL (ref 8.4–10.2)
CHLORIDE SERPL-SCNC: 98 MMOL/L (ref 98–107)
CK MB SERPL-MCNC: 4.8 NG/ML
CO2 SERPL-SCNC: 33 MMOL/L (ref 22–29)
CREAT SERPL-MCNC: 1.09 MG/DL (ref 0.73–1.18)
ERYTHROCYTE [DISTWIDTH] IN BLOOD BY AUTOMATED COUNT: 13.6 % (ref 11.5–17)
GFR SERPLBLD CREATININE-BSD FMLA CKD-EPI: >60 MLS/MIN/1.73/M2
GLOBULIN SER-MCNC: 2.8 GM/DL (ref 2.4–3.5)
GLUCOSE SERPL-MCNC: 96 MG/DL (ref 74–100)
HCT VFR BLD AUTO: 51.3 % (ref 42–52)
HGB BLD-MCNC: 15.3 GM/DL (ref 14–18)
LACTATE SERPL-SCNC: 1.6 MMOL/L (ref 0.5–2.2)
MAGNESIUM SERPL-MCNC: 1.5 MG/DL (ref 1.6–2.6)
MCH RBC QN AUTO: 30.8 PG (ref 27–31)
MCHC RBC AUTO-ENTMCNC: 29.8 MG/DL (ref 33–36)
MCV RBC AUTO: 103.2 FL (ref 80–94)
NRBC BLD AUTO-RTO: 0 %
PHOSPHATE SERPL-MCNC: 5.7 MG/DL (ref 2.3–4.7)
PLATELET # BLD AUTO: 153 X10(3)/MCL (ref 130–400)
PMV BLD AUTO: 10.5 FL (ref 7.4–10.4)
POTASSIUM SERPL-SCNC: 5.2 MMOL/L (ref 3.5–5.1)
PROT SERPL-MCNC: 5.4 GM/DL (ref 6.4–8.3)
RBC # BLD AUTO: 4.97 X10(6)/MCL (ref 4.7–6.1)
SODIUM SERPL-SCNC: 139 MMOL/L (ref 136–145)
TROPONIN I SERPL-MCNC: 0.1 NG/ML (ref 0–0.04)
TROPONIN I SERPL-MCNC: 0.12 NG/ML (ref 0–0.04)
WBC # SPEC AUTO: 10.7 X10(3)/MCL (ref 4.5–11.5)

## 2022-08-14 PROCEDURE — 25000003 PHARM REV CODE 250: Performed by: INTERNAL MEDICINE

## 2022-08-14 PROCEDURE — 83735 ASSAY OF MAGNESIUM: CPT | Performed by: INTERNAL MEDICINE

## 2022-08-14 PROCEDURE — 84100 ASSAY OF PHOSPHORUS: CPT | Performed by: INTERNAL MEDICINE

## 2022-08-14 PROCEDURE — 85027 COMPLETE CBC AUTOMATED: CPT | Performed by: INTERNAL MEDICINE

## 2022-08-14 PROCEDURE — 36415 COLL VENOUS BLD VENIPUNCTURE: CPT | Performed by: INTERNAL MEDICINE

## 2022-08-14 PROCEDURE — 93010 ELECTROCARDIOGRAM REPORT: CPT | Mod: ,,, | Performed by: INTERNAL MEDICINE

## 2022-08-14 PROCEDURE — 83605 ASSAY OF LACTIC ACID: CPT | Performed by: INTERNAL MEDICINE

## 2022-08-14 PROCEDURE — 63600175 PHARM REV CODE 636 W HCPCS: Performed by: INTERNAL MEDICINE

## 2022-08-14 PROCEDURE — 11000001 HC ACUTE MED/SURG PRIVATE ROOM

## 2022-08-14 PROCEDURE — 63600175 PHARM REV CODE 636 W HCPCS: Performed by: EMERGENCY MEDICINE

## 2022-08-14 PROCEDURE — 83880 ASSAY OF NATRIURETIC PEPTIDE: CPT | Performed by: INTERNAL MEDICINE

## 2022-08-14 PROCEDURE — 82553 CREATINE MB FRACTION: CPT | Performed by: INTERNAL MEDICINE

## 2022-08-14 PROCEDURE — 21400001 HC TELEMETRY ROOM

## 2022-08-14 PROCEDURE — 93010 EKG 12-LEAD: ICD-10-PCS | Mod: ,,, | Performed by: INTERNAL MEDICINE

## 2022-08-14 PROCEDURE — 80053 COMPREHEN METABOLIC PANEL: CPT | Performed by: INTERNAL MEDICINE

## 2022-08-14 PROCEDURE — 84484 ASSAY OF TROPONIN QUANT: CPT | Performed by: INTERNAL MEDICINE

## 2022-08-14 PROCEDURE — 93005 ELECTROCARDIOGRAM TRACING: CPT

## 2022-08-14 RX ORDER — MAGNESIUM SULFATE HEPTAHYDRATE 40 MG/ML
2 INJECTION, SOLUTION INTRAVENOUS ONCE
Status: COMPLETED | OUTPATIENT
Start: 2022-08-14 | End: 2022-08-14

## 2022-08-14 RX ORDER — ENOXAPARIN SODIUM 100 MG/ML
60 INJECTION SUBCUTANEOUS
Status: DISCONTINUED | OUTPATIENT
Start: 2022-08-14 | End: 2022-08-15

## 2022-08-14 RX ORDER — LANOLIN ALCOHOL/MO/W.PET/CERES
400 CREAM (GRAM) TOPICAL 2 TIMES DAILY
Status: DISCONTINUED | OUTPATIENT
Start: 2022-08-14 | End: 2022-08-16

## 2022-08-14 RX ADMIN — SODIUM ZIRCONIUM CYCLOSILICATE 10 G: 10 POWDER, FOR SUSPENSION ORAL at 08:08

## 2022-08-14 RX ADMIN — MAGNESIUM SULFATE HEPTAHYDRATE 2 G: 40 INJECTION, SOLUTION INTRAVENOUS at 08:08

## 2022-08-14 RX ADMIN — Medication 400 MG: at 01:08

## 2022-08-14 RX ADMIN — Medication 650 MG: at 03:08

## 2022-08-14 RX ADMIN — FUROSEMIDE 20 MG: 10 INJECTION, SOLUTION INTRAMUSCULAR; INTRAVENOUS at 11:08

## 2022-08-14 RX ADMIN — LEVOFLOXACIN 750 MG: 750 INJECTION, SOLUTION INTRAVENOUS at 02:08

## 2022-08-14 RX ADMIN — ENOXAPARIN SODIUM 60 MG: 100 INJECTION SUBCUTANEOUS at 05:08

## 2022-08-14 RX ADMIN — FUROSEMIDE 20 MG: 10 INJECTION, SOLUTION INTRAMUSCULAR; INTRAVENOUS at 05:08

## 2022-08-14 RX ADMIN — ASPIRIN 325 MG: 325 TABLET, COATED ORAL at 08:08

## 2022-08-14 RX ADMIN — FAMOTIDINE 20 MG: 20 TABLET, FILM COATED ORAL at 08:08

## 2022-08-14 RX ADMIN — FAMOTIDINE 20 MG: 20 TABLET, FILM COATED ORAL at 09:08

## 2022-08-14 RX ADMIN — FUROSEMIDE 20 MG: 10 INJECTION, SOLUTION INTRAMUSCULAR; INTRAVENOUS at 12:08

## 2022-08-14 RX ADMIN — Medication 400 MG: at 09:08

## 2022-08-14 NOTE — CONSULTS
[facility] 3rd Floor Medical Telemetry  Pulmonary Critical Care Note    Patient Name: Harpreet Rodas  MRN: [id number]  Admission Date: 2022  Hospital Length of Stay: 1 days  Code Status: Full Code  Attending Provider: Ana Rosa Fry MD  Primary Care Provider: Primary Doctor No     Subjective:     HPI:   This is a 56-year-old male who was admitted to [facility] on 2022 for complaints of worsening shortness of breath which has been ongoing over the last several months left-sided chest pain, and significant swelling of his right arm and bilateral feet.  Patient has history of drinking and smoking quit approximately 2 months ago prior to that was drinking 4-5 40 oz beer a weekly.  Workup revealed significantly elevated BNP of 3691.5, troponin 0.134, and CT imaging of the chest with a left lower lobe pulmonary thrombus, large right pleural effusion, and emphysematous changes throughout the lungs.  Pulmonary is being consulted for consideration of thoracentesis.  Patient was initiated on Lovenox b.i.d. secondary to pulmonary emboli.  Echocardiogram appears to be pending.      Hospital Course/Significant events:      24 Hour Interval History:      Past Medical History:   Diagnosis Date    COPD (chronic obstructive pulmonary disease)        Past Surgical History:   Procedure Laterality Date    MANDIBLE FRACTURE SURGERY         Social History     Socioeconomic History    Marital status:    Tobacco Use    Smoking status: Former Smoker     Packs/day: 2.00     Quit date: 2022     Years since quittin.1    Smokeless tobacco: Never Used   Substance and Sexual Activity    Alcohol use: Not Currently     Comment: quit 2 months ago    Drug use: Never       Current Outpatient Medications   Medication Instructions    albuterol sulfate (INV ALBUTEROL) 90 mcg inhalation Inhalation, As needed (PRN), Take one puff by mouth as directed by Physician.       Current Inpatient Medications   aspirin   325 mg Oral Daily    enoxaparin  60 mg Subcutaneous Q12H    famotidine  20 mg Oral BID    furosemide (LASIX) injection  20 mg Intravenous Q6H    levoFLOXacin  750 mg Intravenous Q24H    magnesium sulfate IVPB  2 g Intravenous Once    sodium zirconium cyclosilicate  10 g Oral Daily       Review of Systems   Constitutional: Positive for weight loss.   HENT: Negative.    Respiratory: Positive for shortness of breath.    Cardiovascular: Positive for orthopnea and leg swelling.   Gastrointestinal: Positive for diarrhea.   Skin: Negative.           Objective:       Intake/Output Summary (Last 24 hours) at 8/14/2022 0846  Last data filed at 8/14/2022 0400  Gross per 24 hour   Intake 1000 ml   Output 1450 ml   Net -450 ml         Vital Signs (Most Recent):  Temp: 97.5 °F (36.4 °C) (08/14/22 0742)  Pulse: 96 (08/14/22 0400)  Resp: (!) 22 (08/14/22 0742)  BP: 115/82 (08/14/22 0742)  SpO2: 100 % (08/14/22 0346)  Body mass index is 19.06 kg/m².  Weight: 62 kg (136 lb 11 oz) Vital Signs (24h Range):  Temp:  [96.6 °F (35.9 °C)-98.4 °F (36.9 °C)] 97.5 °F (36.4 °C)  Pulse:  [] 96  Resp:  [18-36] 22  SpO2:  [95 %-100 %] 100 %  BP: (115-138)/(82-98) 115/82     Physical Exam  HENT:      Head: Normocephalic and atraumatic.   Cardiovascular:      Rate and Rhythm: Normal rate and regular rhythm.      Heart sounds: Normal heart sounds.   Pulmonary:      Comments: Diminished over right side.   Abdominal:      General: Bowel sounds are normal.      Palpations: Abdomen is soft.   Musculoskeletal:      Right lower leg: Edema present.      Left lower leg: Edema present.      Comments: 3+ edema to BLEs; Right arm with significant edema.    Neurological:      General: No focal deficit present.      Mental Status: He is alert and oriented to person, place, and time.   Psychiatric:         Mood and Affect: Mood normal.         Behavior: Behavior normal.       Lines/Drains/Airways     Peripheral Intravenous Line  Duration                 Peripheral IV - Single Lumen 08/13/22 1232 18 G Left Antecubital <1 day                Significant Labs:    Lab Results   Component Value Date    WBC 10.7 08/14/2022    HGB 15.3 08/14/2022    HCT 51.3 08/14/2022    .2 (H) 08/14/2022     08/14/2022   BNP of 3691.5, troponin 0.134      BMP  Lab Results   Component Value Date     08/14/2022    K 5.2 (H) 08/14/2022    CO2 33 (H) 08/14/2022    BUN 43.2 (H) 08/14/2022    CREATININE 1.09 08/14/2022    CALCIUM 8.6 08/14/2022   Magnesium 1.5, phosphorus 5.7    ABG  Recent Labs   Lab 08/13/22  1247   PH 7.302*   PO2 94   PCO2 59.6*   HCO3 29.4*   BE 3       Significant Imaging:  CTA Chest Abdomen Non Coronary  Narrative: EXAMINATION:  CTA CHEST ABDOMEN NON CORONARY (XPD)    CLINICAL HISTORY:  Pulmonary embolism (PE) suspected, positive D-dimer;    TECHNIQUE:  Axial CTA images of the chest, abdomen, and pelvis were obtained With Contrast. Sagittal and coronal reconstructed images were available for review.    Automatic exposure control was utilized to reduce the patient's radiation dose.    DLP = 582    COMPARISON:  No prior images available for comparison.    FINDINGS:  PULMONARY ARTERY: Thrombus is identified in the left lower lobe pulmonary artery.    AORTA: The thoracoabdominal aorta is normal in course and caliber. Scattered atherosclerotic disease is noted.    HEART: The heart is enlarged.  No pericardial effusion.    THYROID GLAND: The thyroid is not enlarged. There are no nodules identified.    AIRWAYS: Trachea is midline and tracheobronchial tree is patent.    LUNGS: Large right effusion with subsegmental atelectatic changes at the right base.  Emphysematous changes throughout the lungs.    THROACIC LYMPH NODES: There is no significant mediastinal, axillary or hilar lymphadenopathy.    HEPATOBILIARY: Somewhat nodular contour of the superior aspect of the liver with some heterogeneity may be related to phase of contrast versus cirrhosis.  Correlate  with patient's history.  The gallbladder is normal.    SPLEEN: Normal    PANCREAS: No focal masses or ductal dilatation.    ADRENALS: No adrenal nodules.    KIDNEYS: No evidence of hydronephrosis.  Bilateral renal cortical perfusional defects likely related to scarring in chronic kidney disease.  Correlate with patient's history.  Less likely infarcts.    ABDOMINAL LYMPHADENOPATHY/RETROPERITONEUM: There is no retroperitoneal lymphadenopathy.    BOWEL: No acute bowel related abnormalities.    PELVIC VISCERA: The bladder wall is somewhat prominent.  Correlate with urinalysis.    PELVIC LYMPH NODES: No lymphadenopathy.    PERITONEUM/ BODY WALL: Diffuse body wall edema with moderate ascites noted.    SKELETAL: No aggressive appearing lytic/blastic lesion. No acute fractures, subluxations or dislocations.  Impression: Left lower lobe pulmonary thromboembolism is noted.    Large right pleural effusion is present.    Findings of anasarca.    The bladder wall is prominent.  Correlate with urinalysis.    There are cortical defects of the bilateral kidneys likely related to chronic renal disease and scarring, however infarcts are less likely.    The liver appears heterogeneous which may be related to phase of contrast however is cirrhosis is not excluded.  Cardiomegaly is present.  Further evaluation may be obtained with ECHO.    Findings reported to Dr. Little prior to interpretation.    Electronically signed by: Basilio De Oliveira  Date:    08/13/2022  Time:    15:55  X-Ray Chest 1 View  Narrative: EXAMINATION:  XR CHEST 1 VIEW    CLINICAL HISTORY:  shortness of breath;    TECHNIQUE:  Single view of the chest    COMPARISON:  No prior imaging available for comparison.    FINDINGS:  Right greater than left pleural effusion with possible right lower lobe opacification.  Underlying infectious process is not excluded.  Recommend continued follow-up.  Impression: Right greater than left pleural effusion with possible right lower lobe  opacification.  Underlying infectious process is not excluded.  Recommend continued follow-up.    Electronically signed by: Basilio De Oliveira  Date:    08/13/2022  Time:    12:44      Assessment/Plan:     Assessment  1. Pulmonary emboli  2. Right-sided pleural effusion with atelectasis on imaging    3.  Elevated BNP with significant edema concerning for volume overload/congestive heart failure  4. History of tobacco and alcohol abuse  5. Imaging with findings of emphysematous changes  6. Complaints of diarrhea on admit C diff pending    Plan  1. Continue lovenox for PE. Dr. Curry will review imaging and decide if patient would benefit from thoracentesis. Lovenox would need to be held for thoracentesis to be done -- defer to MD with rounds.   2. Echo pending will need to follow for results.   3. Continue diuresis as toleated.      Malathi Bennett, ANP  Pulmonary Critical Care Medicine  [facility] - 3rd Floor Medical Telemetry

## 2022-08-14 NOTE — PROGRESS NOTES
"[facility] MEDICINE PROGRESS NOTE      Patient Name: Harpreet Rodas  MRN: [id number]  Admission Date: 8/13/2022 12:32 PM  Status: IP- Inpatient   Length of Stay: 1 days  Face-to-Face encounter date: 08/14/2022       CHIEF COMPLAINT  Shortness of Breath (Pt to er via aasi with c/o sob and swelling to extremities.)    HOSPITAL COURSE  56 y.o. male who  has a past medical history of COPD (chronic obstructive pulmonary disease).. The patient presented to [facility] on 8/13/2022 with a primary complaint of dyspnea on exertion, edema and chest pain.      The pt says for about 6 months he has had worsening dyspnea on exertion. Currently, just walking in his living room make him extremely short of breath. He started having swelling of his right arm and both legs 3 days ago. He started having diarrhea at the same time. He has lost weight. He says he gets hungry but feels full after a few bites and the food goes right through him. He came to the ER he began having left side chest pain, "a sticking sensation". He denies associated symptoms it only lasts a short time. No radiation. No known hx of CAD. Denies fever, chills or sweats. He quit smoking and drinking about 2 months ago after his wife had pneumonia and his breathing was worsening. He was drinking 4-5 40oz beers a week prior to quitting. The pt became very short of breath when trying to get into wheelchair for chest CT. However, he was able to tolerate CT and lay flat when transported by bed. He was given lasix prior to my arrival. He says he is feeling better. He is not on home oxygen. Only home med is an inhaler.    Today (08/14/2022):  He reports he feels much better.  He is currently on 5 L via Oxymizer.  He denies any chest pain. Noted with PVCs and intermittent second degree type 2 blocks on telemetry      OBJECTIVE    VITAL SIGNS: 24 HRS MIN & MAX LAST   Temp  Min: 96.6 °F (35.9 °C)  Max: 98.4 °F (36.9 °C) 97.4 °F (36.3 °C)   BP  " Min: 115/82  Max: 138/98 119/83   Pulse  Min: 92  Max: 105  96   Resp  Min: 18  Max: 36 (Abnormal) 22   SpO2  Min: 95 %  Max: 100 % 100 %       LABS/MICROBIOLOGY/MEDICATIONS/DIAGNOSTICS  I have reviewed all pertinent lab results within the past 24 hours.    Recent Labs   Lab 08/13/22  1255 08/14/22  0658   WBC 14.7* 10.7   RBC 5.69 4.97   HGB 17.6 15.3   HCT 54.9* 51.3   MCV 96.5* 103.2*   MCH 30.9 30.8   MCHC 32.1* 29.8*   RDW 13.7 13.6    153   MPV 11.2* 10.5*       Recent Labs   Lab 08/13/22  1247 08/13/22  1247 08/13/22  1255 08/14/22  0658   NA  --   --  140 139   K  --   --  6.1* 5.2*   CO2  --   --  28 33*   BUN  --   --  45.0* 43.2*   CREATININE  --   --  1.36* 1.09   CALCIUM  --   --  8.9 8.6   PH 7.302 7.302*  --   --    MG  --   --  1.80 1.50*   ALBUMIN  --   --  2.9* 2.6*   ALKPHOS  --   --  85 69   ALT  --   --  23 21   AST  --   --  37* 25   BILITOT  --   --  1.2 0.7       Recent Labs     08/13/22  1255 08/14/22  0658   WBC 14.7* 10.7   RBC 5.69 4.97   HGB 17.6 15.3   HCT 54.9* 51.3   MCV 96.5* 103.2*   MCH 30.9 30.8   MCHC 32.1* 29.8*   RDW 13.7 13.6     Recent Labs     08/13/22  1255 08/14/22  0658   CHLORIDE 98 98   CO2 28 33*   BUN 45.0* 43.2*   CREATININE 1.36* 1.09   GLUCOSE 136* 96   CALCIUM 8.9 8.6   ALBUMIN 2.9* 2.6*   ALKPHOS 85 69   ALT 23 21   AST 37* 25   BNP 3,280.3* 3,691.5*   CPK 85  --    TROPONINI 0.134*  --        Microbiology Results (last 7 days)     Procedure Component Value Units Date/Time    Urine culture [[id number]] Collected: 08/13/22 1254    Order Status: Completed Specimen: Urine, Clean Catch Updated: 08/14/22 0646     Urine Culture No Growth At 24 Hours    Clostridium Diff Toxin, A & B, EIA [[id number]] Collected: 08/13/22 2310    Order Status: Sent Specimen: Stool     Stool Culture **CANNOT BE ORDERED STAT** [[id number]]     Order Status: Sent Specimen: Stool     Blood Culture [[id number]] Collected: 08/13/22 1314    Order Status: Resulted Specimen: Blood from Arm,  Left Updated: 08/13/22 1314    Blood Culture [[id number]] Collected: 08/13/22 1314    Order Status: Resulted Specimen: Blood from Hand, Left Updated: 08/13/22 1314           CTA Chest Abdomen Non Coronary  Narrative: EXAMINATION:  CTA CHEST ABDOMEN NON CORONARY (XPD)    CLINICAL HISTORY:  Pulmonary embolism (PE) suspected, positive D-dimer;    TECHNIQUE:  Axial CTA images of the chest, abdomen, and pelvis were obtained With Contrast. Sagittal and coronal reconstructed images were available for review.    Automatic exposure control was utilized to reduce the patient's radiation dose.    DLP = 582    COMPARISON:  No prior images available for comparison.    FINDINGS:  PULMONARY ARTERY: Thrombus is identified in the left lower lobe pulmonary artery.    AORTA: The thoracoabdominal aorta is normal in course and caliber. Scattered atherosclerotic disease is noted.    HEART: The heart is enlarged.  No pericardial effusion.    THYROID GLAND: The thyroid is not enlarged. There are no nodules identified.    AIRWAYS: Trachea is midline and tracheobronchial tree is patent.    LUNGS: Large right effusion with subsegmental atelectatic changes at the right base.  Emphysematous changes throughout the lungs.    THROACIC LYMPH NODES: There is no significant mediastinal, axillary or hilar lymphadenopathy.    HEPATOBILIARY: Somewhat nodular contour of the superior aspect of the liver with some heterogeneity may be related to phase of contrast versus cirrhosis.  Correlate with patient's history.  The gallbladder is normal.    SPLEEN: Normal    PANCREAS: No focal masses or ductal dilatation.    ADRENALS: No adrenal nodules.    KIDNEYS: No evidence of hydronephrosis.  Bilateral renal cortical perfusional defects likely related to scarring in chronic kidney disease.  Correlate with patient's history.  Less likely infarcts.    ABDOMINAL LYMPHADENOPATHY/RETROPERITONEUM: There is no retroperitoneal lymphadenopathy.    BOWEL: No acute bowel  related abnormalities.    PELVIC VISCERA: The bladder wall is somewhat prominent.  Correlate with urinalysis.    PELVIC LYMPH NODES: No lymphadenopathy.    PERITONEUM/ BODY WALL: Diffuse body wall edema with moderate ascites noted.    SKELETAL: No aggressive appearing lytic/blastic lesion. No acute fractures, subluxations or dislocations.  Impression: Left lower lobe pulmonary thromboembolism is noted.    Large right pleural effusion is present.    Findings of anasarca.    The bladder wall is prominent.  Correlate with urinalysis.    There are cortical defects of the bilateral kidneys likely related to chronic renal disease and scarring, however infarcts are less likely.    The liver appears heterogeneous which may be related to phase of contrast however is cirrhosis is not excluded.  Cardiomegaly is present.  Further evaluation may be obtained with ECHO.    Findings reported to Dr. Little prior to interpretation.    Electronically signed by: Basilio De Oliveira  Date:    08/13/2022  Time:    15:55  X-Ray Chest 1 View  Narrative: EXAMINATION:  XR CHEST 1 VIEW    CLINICAL HISTORY:  shortness of breath;    TECHNIQUE:  Single view of the chest    COMPARISON:  No prior imaging available for comparison.    FINDINGS:  Right greater than left pleural effusion with possible right lower lobe opacification.  Underlying infectious process is not excluded.  Recommend continued follow-up.  Impression: Right greater than left pleural effusion with possible right lower lobe opacification.  Underlying infectious process is not excluded.  Recommend continued follow-up.    Electronically signed by: Basilio De Oliveira  Date:    08/13/2022  Time:    12:44        Scheduled Med:   aspirin  325 mg Oral Daily    enoxaparin  60 mg Subcutaneous Q12H    famotidine  20 mg Oral BID    furosemide (LASIX) injection  20 mg Intravenous Q6H    levoFLOXacin  750 mg Intravenous Q24H    sodium zirconium cyclosilicate  10 g Oral Daily        Continuous  Infusions:       PRN Meds:  acetaminophen, albuterol-ipratropium, melatonin, promethazine, sodium chloride 0.9%, sodium chloride 0.9%       PHYSICAL EXAM  Constitutional: Appears chronically ill looking. Cachectic. No acute distress.   Head/Eyes/Ears/Nose/Mouth/Throat: Normocephalic, atraumatic. PERRLA, EOM intact. Conjunctive clear, sclera white. Pinna no masses, lesions, tenderness, drainage. Pharynx pink.  Neck: No JVD present. Normal alignment and mobility.  Cardiovascular: Regular rate and rhythm, S1 / S2, no systolic or diastolic murmur, no rubs or gallops.  Pulmonary/Chest: Effort normal. No respiratory distress. Breath sounds decreased bilaterally, bilateral rales.   Abdominal: Flat, soft, non-tender, non-distended, no rebound tenderness or guarding, normal bowel sounds in all four quadrants.  Musculoskeletal: Extremities symmetric, no tenderness, weakness, or swelling. Maintains flexion against resistance.   Extremities: Anasarca with dependent edema to his back. 3+ pitting edema right upper extremity and bilateral lower extremity to, + scrotal edema.  Neurological: Alert and oriented to person, place, and time. Cooperative. Speech clear, appropriate. No dysarthria. Cranial nerves II-XII grossly intact. No focal deficits.  Psychiatric: No depression, anxiety or agitation. Normal affect, no hallucinations or suicidal ideations, no pressured speech.      ASSESSMENT  Acute hypercapnic and hypoxic respiratory failure  Left lower lobe acute pulmonary thromboembolism  Large right pleural effusion  Anasarca, volume overload  Suspected acute onset congestive heart failure, unspecified heart failure type, elevated BNP  Hyperkalemia  Mild acute kidney injury  Possible liver cirrhosis  Cardiomegaly  Chest pain  Suspected right lower lobe pneumonia vs.  Atelectasis  Dyspnea on exertion  Diarrhea  Lactic acidosis: Resolved  COPD, emphysema  Ex-smoker, quit 2 months ago  Previous alcohol abuse  Leukocytosis  Hypo  magnesemia  PVCs and intermittent second degree type 2 blocks  Moderate ascites     PLAN  Continue oxygen via Oxymizer, wean oxygen as tolerated, keep SaO2 greater than 88%  Bronchodilators  Blood cultures pending, urine culture NGTD   Continue IV Levaquin  Trend troponin, follow-up echocardiogram  Consult cardiology  IV Lasix, replace electrolytes as needed   Medical management of hyperkalemia  Continue therapeutic dose Lovenox.  Follow-up bilateral upper and lower extremity Doppler ultrasound to rule out DVT  Check abdominal ultrasound to evaluate for cirrhosis  Pulmonology following    DVT Prophylaxis: on Lovenox  CODE STATUS discussed. He wishes to be full code    Patient condition: Critical  Critical Care diagnosis:  Acute respiratory failure, volume overload, acute PE, suspected acute CHF exacerbation  Critical care time: 52 minutes    Anticipated discharge and disposition: TBD  __________________________________________________________________________    All diagnosis and differential diagnosis have been reviewed; assessment and plan have been documented. I have personally reviewed the test results that are presently available; I have reviewed the patient's medication list. I have reviewed the consulting providers' recommendations. I have reviewed or attempted to review medical records based upon their availability.  All of the patient's questions have been addressed and answered. Patient is agreeable to the above stated plan. I will continue to monitor closely and make adjustment to medical management as needed.    This document was created with the assistance of a voice recognition software. There may be transcription errors as a result of using this technology, however minimal. Effort has been made to ensure accuracy of transcription but any obvious errors or omissions should be clarified with the author of this document.        Kwasi Thao MD   [facility]  08/14/2022

## 2022-08-15 LAB
ALBUMIN SERPL-MCNC: 2.6 GM/DL (ref 3.5–5)
ALBUMIN/GLOB SERPL: 0.9 RATIO (ref 1.1–2)
ALP SERPL-CCNC: 86 UNIT/L (ref 40–150)
ALT SERPL-CCNC: 23 UNIT/L (ref 0–55)
AST SERPL-CCNC: 25 UNIT/L (ref 5–34)
AV INDEX (PROSTH): 0.39
AV MEAN GRADIENT: 3 MMHG
AV PEAK GRADIENT: 6 MMHG
AV VALVE AREA: 1.24 CM2
AV VELOCITY RATIO: 0.46
BACTERIA UR CULT: NO GROWTH
BASOPHILS # BLD AUTO: 0.02 X10(3)/MCL (ref 0–0.2)
BASOPHILS NFR BLD AUTO: 0.3 %
BILIRUBIN DIRECT+TOT PNL SERPL-MCNC: 0.5 MG/DL
BSA FOR ECHO PROCEDURE: 1.75 M2
BUN SERPL-MCNC: 38.1 MG/DL (ref 8.4–25.7)
CALCIUM SERPL-MCNC: 8 MG/DL (ref 8.4–10.2)
CHLORIDE SERPL-SCNC: 94 MMOL/L (ref 98–107)
CO2 SERPL-SCNC: 37 MMOL/L (ref 22–29)
CREAT SERPL-MCNC: 0.86 MG/DL (ref 0.73–1.18)
CV ECHO LV RWT: 0.38 CM
DOP CALC AO PEAK VEL: 1.23 M/S
DOP CALC AO VTI: 18.5 CM
DOP CALC LVOT AREA: 3.1 CM2
DOP CALC LVOT DIAMETER: 2 CM
DOP CALC LVOT PEAK VEL: 0.57 M/S
DOP CALC LVOT STROKE VOLUME: 22.92 CM3
DOP CALC MV VTI: 20.4 CM
DOP CALCLVOT PEAK VEL VTI: 7.3 CM
E WAVE DECELERATION TIME: 190 MSEC
E/A RATIO: 3.11
E/E' RATIO: 16.8 M/S
ECHO LV POSTERIOR WALL: 1.05 CM (ref 0.6–1.1)
EJECTION FRACTION: 15 %
EOSINOPHIL # BLD AUTO: 0.09 X10(3)/MCL (ref 0–0.9)
EOSINOPHIL NFR BLD AUTO: 1.1 %
ERYTHROCYTE [DISTWIDTH] IN BLOOD BY AUTOMATED COUNT: 13.4 % (ref 11.5–17)
FRACTIONAL SHORTENING: 14 % (ref 28–44)
GFR SERPLBLD CREATININE-BSD FMLA CKD-EPI: >60 MLS/MIN/1.73/M2
GLOBULIN SER-MCNC: 2.9 GM/DL (ref 2.4–3.5)
GLUCOSE SERPL-MCNC: 99 MG/DL (ref 74–100)
HCT VFR BLD AUTO: 44.2 % (ref 42–52)
HGB BLD-MCNC: 13.5 GM/DL (ref 14–18)
IMM GRANULOCYTES # BLD AUTO: 0.02 X10(3)/MCL (ref 0–0.04)
IMM GRANULOCYTES NFR BLD AUTO: 0.3 %
INTERVENTRICULAR SEPTUM: 1.11 CM (ref 0.6–1.1)
LEFT ATRIUM SIZE: 4 CM
LEFT INTERNAL DIMENSION IN SYSTOLE: 4.79 CM (ref 2.1–4)
LEFT VENTRICLE DIASTOLIC VOLUME INDEX: 86.03 ML/M2
LEFT VENTRICLE DIASTOLIC VOLUME: 154 ML
LEFT VENTRICLE MASS INDEX: 136 G/M2
LEFT VENTRICLE SYSTOLIC VOLUME INDEX: 59.8 ML/M2
LEFT VENTRICLE SYSTOLIC VOLUME: 107 ML
LEFT VENTRICULAR INTERNAL DIMENSION IN DIASTOLE: 5.6 CM (ref 3.5–6)
LEFT VENTRICULAR MASS: 243.27 G
LV LATERAL E/E' RATIO: 16.8 M/S
LV SEPTAL E/E' RATIO: 16.8 M/S
LVOT MG: 1 MMHG
LVOT MV: 0.35 CM/S
LYMPHOCYTES # BLD AUTO: 1.39 X10(3)/MCL (ref 0.6–4.6)
LYMPHOCYTES NFR BLD AUTO: 17.4 %
MAGNESIUM SERPL-MCNC: 1.5 MG/DL (ref 1.6–2.6)
MCH RBC QN AUTO: 30.8 PG (ref 27–31)
MCHC RBC AUTO-ENTMCNC: 30.5 MG/DL (ref 33–36)
MCV RBC AUTO: 100.7 FL (ref 80–94)
MONOCYTES # BLD AUTO: 0.63 X10(3)/MCL (ref 0.1–1.3)
MONOCYTES NFR BLD AUTO: 7.9 %
MV MEAN GRADIENT: 1 MMHG
MV PEAK A VEL: 0.27 M/S
MV PEAK E VEL: 0.84 M/S
MV PEAK GRADIENT: 3 MMHG
MV STENOSIS PRESSURE HALF TIME: 66 MS
MV VALVE AREA BY CONTINUITY EQUATION: 1.12 CM2
MV VALVE AREA P 1/2 METHOD: 3.33 CM2
NEUTROPHILS # BLD AUTO: 5.8 X10(3)/MCL (ref 2.1–9.2)
NEUTROPHILS NFR BLD AUTO: 73 %
NRBC BLD AUTO-RTO: 0 %
PHOSPHATE SERPL-MCNC: 4.2 MG/DL (ref 2.3–4.7)
PISA TR MAX VEL: 3.23 M/S
PLATELET # BLD AUTO: 127 X10(3)/MCL (ref 130–400)
PMV BLD AUTO: 10 FL (ref 7.4–10.4)
POTASSIUM SERPL-SCNC: 3.5 MMOL/L (ref 3.5–5.1)
PROT SERPL-MCNC: 5.5 GM/DL (ref 6.4–8.3)
RA PRESSURE: 8 MMHG
RBC # BLD AUTO: 4.39 X10(6)/MCL (ref 4.7–6.1)
SODIUM SERPL-SCNC: 141 MMOL/L (ref 136–145)
TDI LATERAL: 0.05 M/S
TDI SEPTAL: 0.05 M/S
TDI: 0.05 M/S
TR MAX PG: 42 MMHG
TRICUSPID ANNULAR PLANE SYSTOLIC EXCURSION: 0.97 CM
TROPONIN I SERPL-MCNC: 0.1 NG/ML (ref 0–0.04)
TV REST PULMONARY ARTERY PRESSURE: 50 MMHG
WBC # SPEC AUTO: 8 X10(3)/MCL (ref 4.5–11.5)

## 2022-08-15 PROCEDURE — 97166 OT EVAL MOD COMPLEX 45 MIN: CPT

## 2022-08-15 PROCEDURE — 85025 COMPLETE CBC W/AUTO DIFF WBC: CPT | Performed by: INTERNAL MEDICINE

## 2022-08-15 PROCEDURE — 25000003 PHARM REV CODE 250: Performed by: INTERNAL MEDICINE

## 2022-08-15 PROCEDURE — 84484 ASSAY OF TROPONIN QUANT: CPT | Performed by: INTERNAL MEDICINE

## 2022-08-15 PROCEDURE — 21400001 HC TELEMETRY ROOM

## 2022-08-15 PROCEDURE — 97162 PT EVAL MOD COMPLEX 30 MIN: CPT

## 2022-08-15 PROCEDURE — 84100 ASSAY OF PHOSPHORUS: CPT | Performed by: INTERNAL MEDICINE

## 2022-08-15 PROCEDURE — 80053 COMPREHEN METABOLIC PANEL: CPT | Performed by: INTERNAL MEDICINE

## 2022-08-15 PROCEDURE — 36415 COLL VENOUS BLD VENIPUNCTURE: CPT | Performed by: INTERNAL MEDICINE

## 2022-08-15 PROCEDURE — 83735 ASSAY OF MAGNESIUM: CPT | Performed by: INTERNAL MEDICINE

## 2022-08-15 PROCEDURE — 63600175 PHARM REV CODE 636 W HCPCS: Performed by: INTERNAL MEDICINE

## 2022-08-15 RX ORDER — ENOXAPARIN SODIUM 100 MG/ML
60 INJECTION SUBCUTANEOUS ONCE
Status: COMPLETED | OUTPATIENT
Start: 2022-08-15 | End: 2022-08-15

## 2022-08-15 RX ORDER — NAPROXEN SODIUM 220 MG/1
81 TABLET, FILM COATED ORAL DAILY
Status: DISCONTINUED | OUTPATIENT
Start: 2022-08-16 | End: 2022-08-26 | Stop reason: HOSPADM

## 2022-08-15 RX ORDER — MAGNESIUM SULFATE HEPTAHYDRATE 40 MG/ML
2 INJECTION, SOLUTION INTRAVENOUS
Status: DISPENSED | OUTPATIENT
Start: 2022-08-15 | End: 2022-08-15

## 2022-08-15 RX ORDER — ENOXAPARIN SODIUM 100 MG/ML
80 INJECTION SUBCUTANEOUS
Status: DISCONTINUED | OUTPATIENT
Start: 2022-08-15 | End: 2022-08-23

## 2022-08-15 RX ORDER — ATORVASTATIN CALCIUM 40 MG/1
40 TABLET, FILM COATED ORAL NIGHTLY
Status: DISCONTINUED | OUTPATIENT
Start: 2022-08-15 | End: 2022-08-26 | Stop reason: HOSPADM

## 2022-08-15 RX ADMIN — Medication 400 MG: at 08:08

## 2022-08-15 RX ADMIN — ASPIRIN 325 MG: 325 TABLET, COATED ORAL at 09:08

## 2022-08-15 RX ADMIN — FUROSEMIDE 20 MG: 10 INJECTION, SOLUTION INTRAMUSCULAR; INTRAVENOUS at 01:08

## 2022-08-15 RX ADMIN — ATORVASTATIN CALCIUM 40 MG: 40 TABLET, FILM COATED ORAL at 08:08

## 2022-08-15 RX ADMIN — FAMOTIDINE 20 MG: 20 TABLET, FILM COATED ORAL at 08:08

## 2022-08-15 RX ADMIN — ENOXAPARIN SODIUM 80 MG: 80 INJECTION SUBCUTANEOUS at 05:08

## 2022-08-15 RX ADMIN — Medication 650 MG: at 03:08

## 2022-08-15 RX ADMIN — Medication 650 MG: at 05:08

## 2022-08-15 RX ADMIN — FAMOTIDINE 20 MG: 20 TABLET, FILM COATED ORAL at 09:08

## 2022-08-15 RX ADMIN — MAGNESIUM SULFATE HEPTAHYDRATE 2 G: 40 INJECTION, SOLUTION INTRAVENOUS at 09:08

## 2022-08-15 RX ADMIN — FUROSEMIDE 20 MG: 10 INJECTION, SOLUTION INTRAMUSCULAR; INTRAVENOUS at 05:08

## 2022-08-15 RX ADMIN — Medication 400 MG: at 09:08

## 2022-08-15 RX ADMIN — ENOXAPARIN SODIUM 60 MG: 100 INJECTION SUBCUTANEOUS at 02:08

## 2022-08-15 NOTE — PLAN OF CARE
Problem: Physical Therapy  Goal: Physical Therapy Goal  Description: Goals to be met by: 9/15/22     Patient will increase functional independence with mobility by performin. Supine to sit with Stand-by Assistance  2. Sit to stand transfer with Stand-by Assistance  3. Gait  x 150 feet with Stand-by Assistance using Rolling Walker.   4. Ascend/descend 12 stair with right Handrails Stand-by Assistance using No Assistive Device.     Outcome: Ongoing, Progressing

## 2022-08-15 NOTE — PT/OT/SLP EVAL
Occupational Therapy   Evaluation    Name: Harpreet Rodas  MRN: [id number]  Admitting Diagnosis:  Anasarca  Recent Surgery: * No surgery found *      Recommendations:     Discharge Recommendations: nursing facility, skilled, rehabilitation facility (TBD, pending progress.)  Discharge Equipment Recommendations:  walker, rolling, bath bench, dressing device (TBD, pending progress.)  Barriers to discharge: Medical dx    Assessment:     Harpreet Rodas is a 56 y.o. male with a medical diagnosis of Anasarca.  He presents with edema in RUE and BLEs. Performance deficits affecting function: weakness, impaired endurance, impaired self care skills, impaired functional mobility, impaired balance, decreased safety awareness.      Rehab Prognosis: Fair; patient would benefit from acute skilled OT services to address these deficits and reach maximum level of function.       Plan:     Patient to be seen 3 x/week, 4 x/week, 5 x/week to address the above listed problems via self-care/home management, therapeutic activities, therapeutic exercises  · Plan of Care Expires: 08/29/22  · Plan of Care Reviewed with: patient    Subjective     Chief Complaint: Dizziness with activity.  Patient/Family Comments/goals: Return to PLOF.    Occupational Profile:  Living Environment: Lives with wife in 2nd floor apt with ~15 DANIEL and B HR. Owns tub/shower with GBs and HHS.  Previous level of function: Indep ADL/IADLs (except driving) and worked FT (fixing up houses per pt).  Equipment Used at Home: none  Assistance upon Discharge: TBD. Pt reported that his wife was recently hospitalized.    Patients cultural, spiritual, Caodaism conflicts given the current situation: no    Objective:     Communicated with: RN prior to session. Patient found up in chair with oxygen, telemetry, pulse ox (continuous) upon OT entry to room.    General Precautions: Standard, fall   Respiratory Status: Nasal cannula, flow 2 L/min   Vitals:  BP:   At rest:  107/69   Post-act: 92/62  WA:   At rest: 84   Post-act: 95  O2:   At rest: 96%   Post-act: O2 desat to 87%  Pt's RN was notified and arrived to assess pt during eval.    Occupational Performance:    Bed Mobility:    · Patient completed Sit to Supine with moderate assistance    Functional Mobility/Transfers:  Patient completed Chair > bed Transfer using Step Transfer technique with moderate assistance with rolling walker    Cognitive/Visual Perceptual:  Cognitive/Psychosocial Skills:     -       Oriented to: Person, Place, Time and Situation   -       Follows Commands/attention:Follows one-step commands and Follows two-step commands    Physical Exam:  Edema:  RUE and BLEs  Sensation:    -       Intact  Upper Extremity Range of Motion:     -       Right Upper Extremity: WFL except mod deficits noted in AROM of shoulder flexion  -       Left Upper Extremity: WFL except mod deficits noted in AROM of shoulder flexion  Upper Extremity Strength:    -       Right Upper Extremity: WFL except shoulder flex=3-/5  -       Left Upper Extremity: WFL except shoulder flex=3-/5  Education:    Patient left HOB elevated with all lines intact, call button in reach and pillows positioned under pt's RUE and BLEs.    GOALS:   Multidisciplinary Problems     Occupational Therapy Goals        Problem: Occupational Therapy    Goal Priority Disciplines Outcome Interventions   Occupational Therapy Goal     OT, PT/OT Ongoing, Progressing    Description: Goals to be met by: 8/29/22    Patient will increase functional independence with ADLs by performing:    LE Dressing with Stand-by Assistance.  Grooming while standing at sink with Stand-by Assistance.  Toileting from toilet with Stand-by Assistance for hygiene and clothing management.                      History:     Past Medical History:   Diagnosis Date    COPD (chronic obstructive pulmonary disease)        Past Surgical History:   Procedure Laterality Date    MANDIBLE FRACTURE SURGERY          Time Tracking:     OT Date of Treatment: 8/15/22  OT Start Time: 1543  OT Stop Time: 1605  OT Total Time (min): 22 min    Billable Minutes:Evaluation 22 mins    8/15/2022

## 2022-08-15 NOTE — PROGRESS NOTES
"[facility] MEDICINE PROGRESS NOTE      Patient Name: Harpreet Rodas  MRN: [id number]  Admission Date: 8/13/2022 12:32 PM  Status: IP- Inpatient   Length of Stay: 2 days  Face-to-Face encounter date: 08/15/2022       CHIEF COMPLAINT  Shortness of Breath (Pt to er via aasi with c/o sob and swelling to extremities.)    HOSPITAL COURSE  56 y.o. male who  has a past medical history of COPD (chronic obstructive pulmonary disease).. The patient presented to [facility] on 8/13/2022 with a primary complaint of dyspnea on exertion, edema and chest pain.      The pt says for about 6 months he has had worsening dyspnea on exertion. Currently, just walking in his living room make him extremely short of breath. He started having swelling of his right arm and both legs 3 days ago. He started having diarrhea at the same time. He has lost weight. He says he gets hungry but feels full after a few bites and the food goes right through him. He came to the ER he began having left side chest pain, "a sticking sensation". He denies associated symptoms it only lasts a short time. No radiation. No known hx of CAD. Denies fever, chills or sweats. He quit smoking and drinking about 2 months ago after his wife had pneumonia and his breathing was worsening. He was drinking 4-5 40oz beers a week prior to quitting. The pt became very short of breath when trying to get into wheelchair for chest CT. However, he was able to tolerate CT and lay flat when transported by bed. He was given lasix prior to my arrival. He says he is feeling better. He is not on home oxygen. Only home med is an inhaler.    Today (08/15/2022):  He reports shortness of breath, generalized weakness.  He is currently on 3L NC oxygen.  He denies any chest pain. No fever.     OBJECTIVE    VITAL SIGNS: 24 HRS MIN & MAX LAST   Temp  Min: 96.7 °F (35.9 °C)  Max: 97.8 °F (36.6 °C) 97.7 °F (36.5 °C)   BP  Min: 100/69  Max: 142/77 122/84   Pulse  Min: " 87  Max: 99  88   Resp  Min: 18  Max: 20 18   SpO2  Min: 93 %  Max: 98 % 96 %       LABS/MICROBIOLOGY/MEDICATIONS/DIAGNOSTICS  I have reviewed all pertinent lab results within the past 24 hours.    Recent Labs   Lab 08/13/22  1255 08/14/22  0658 08/15/22  0329   WBC 14.7* 10.7 8.0   RBC 5.69 4.97 4.39*   HGB 17.6 15.3 13.5*   HCT 54.9* 51.3 44.2   MCV 96.5* 103.2* 100.7*   MCH 30.9 30.8 30.8   MCHC 32.1* 29.8* 30.5*   RDW 13.7 13.6 13.4    153 127*   MPV 11.2* 10.5* 10.0       Recent Labs   Lab 08/13/22  1247 08/13/22  1247 08/13/22  1255 08/14/22  0658 08/15/22  0330   NA  --   --  140 139 141   K  --   --  6.1* 5.2* 3.5   CO2  --   --  28 33* 37*   BUN  --   --  45.0* 43.2* 38.1*   CREATININE  --   --  1.36* 1.09 0.86   CALCIUM  --   --  8.9 8.6 8.0*   PH 7.302 7.302*  --   --   --    MG  --   --  1.80 1.50* 1.50*   ALBUMIN  --   --  2.9* 2.6* 2.6*   ALKPHOS  --   --  85 69 86   ALT  --   --  23 21 23   AST  --   --  37* 25 25   BILITOT  --   --  1.2 0.7 0.5       Recent Labs     08/14/22  0658 08/15/22  0329   WBC 10.7 8.0   RBC 4.97 4.39*   HGB 15.3 13.5*   HCT 51.3 44.2   .2* 100.7*   MCH 30.8 30.8   MCHC 29.8* 30.5*   RDW 13.6 13.4     Recent Labs     08/13/22  1255 08/14/22  0658 08/14/22  1316 08/14/22  1836 08/15/22  0250 08/15/22  0330   CHLORIDE 98 98  --   --   --  94*   CO2 28 33*  --   --   --  37*   BUN 45.0* 43.2*  --   --   --  38.1*   CREATININE 1.36* 1.09  --   --   --  0.86   GLUCOSE 136* 96  --   --   --  99   CALCIUM 8.9 8.6  --   --   --  8.0*   ALBUMIN 2.9* 2.6*  --   --   --  2.6*   ALKPHOS 85 69  --   --   --  86   ALT 23 21  --   --   --  23   AST 37* 25  --   --   --  25   BNP 3,280.3* 3,691.5*  --   --   --   --    CPK 85  --   --   --   --   --    TROPONINI 0.134*  --  0.097* 0.120* 0.105*  --        Microbiology Results (last 7 days)     Procedure Component Value Units Date/Time    Urine culture [[id number]] Collected: 08/13/22 1254    Order Status: Completed Specimen:  Urine, Clean Catch Updated: 08/15/22 0958     Urine Culture No Growth    Blood Culture [[id number]]  (Normal) Collected: 08/13/22 1314    Order Status: Completed Specimen: Blood from Arm, Left Updated: 08/14/22 2103     CULTURE, BLOOD ([facility]) No Growth At 24 Hours    Blood Culture [[id number]]  (Normal) Collected: 08/13/22 1314    Order Status: Completed Specimen: Blood from Hand, Left Updated: 08/14/22 2103     CULTURE, BLOOD ([facility]) No Growth At 24 Hours    Clostridium Diff Toxin, A & B, EIA [[id number]] Collected: 08/13/22 2310    Order Status: Sent Specimen: Stool     Stool Culture **CANNOT BE ORDERED STAT** [[id number]]     Order Status: Sent Specimen: Stool            Echo  · The left ventricle is mildly enlarged with severely decreased systolic   function.  · The estimated ejection fraction is 15%.  · Grade III left ventricular diastolic dysfunction.  · Moderate right ventricular enlargement with moderately reduced right   ventricular systolic function.  · There is pulmonary hypertension. The estimated PA systolic pressure is   50 mmHg.  · Mild tricuspid regurgitation.  · Moderate right atrial enlargement.  · Mild left atrial enlargement.           Scheduled Med:   [START ON 8/16/2022] aspirin  81 mg Oral Daily    enoxaparin  60 mg Subcutaneous Q12H    famotidine  20 mg Oral BID    furosemide (LASIX) injection  20 mg Intravenous Q6H    levoFLOXacin  750 mg Intravenous Q24H    magnesium oxide  400 mg Oral BID        Continuous Infusions:       PRN Meds:  acetaminophen, albuterol-ipratropium, melatonin, promethazine, sodium chloride 0.9%, sodium chloride 0.9%       PHYSICAL EXAM  Constitutional: Appears chronically ill looking. Cachectic. No acute distress.   Head/Eyes/Ears/Nose/Mouth/Throat: Normocephalic, atraumatic. PERRLA, EOM intact. Conjunctive clear, sclera white. Pinna no masses, lesions, tenderness, drainage. Pharynx pink.  Neck: No JVD present. Normal alignment and mobility.  Cardiovascular: Regular  rate and rhythm, S1 / S2, no systolic or diastolic murmur, no rubs or gallops.  Pulmonary/Chest: Effort normal. No respiratory distress. Breath sounds decreased bilaterally, bilateral rales.   Abdominal: Flat, soft, non-tender, non-distended, no rebound tenderness or guarding, normal bowel sounds in all four quadrants.  Musculoskeletal: Extremities symmetric, no tenderness, weakness, or swelling. Maintains flexion against resistance.   Extremities: Anasarca with dependent edema to his back. 3+ pitting edema right upper extremity and bilateral lower extremity to, + scrotal edema.  Neurological: Alert and oriented to person, place, and time. Cooperative. Speech clear, appropriate. No dysarthria. Cranial nerves II-XII grossly intact. No focal deficits.  Psychiatric: No depression, anxiety or agitation. Normal affect, no hallucinations or suicidal ideations, no pressured speech.      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  No focal suspicious liver lesion  Acute NSTEMI, type I vs. Type II  Right lower lobe atelectasis, doubt pneumonia  Dyspnea on exertion  Diarrhea: resolved  Lactic acidosis: Resolved  COPD, emphysema  Ex-smoker, quit 2 months ago  Previous alcohol abuse  Leukocytosis  Hypomagnesemia  PVCs   Left BBB  Moderate ascites     PLAN  Continue oxygen via Oxymizer, wean oxygen as tolerated, keep SaO2 greater than 88%  Bronchodilators  Blood cultures and urine culture NGTD   DC IV Levaquin  Echocardiogram showed EF 15%, grade III left  ventricular diastolic dysfunction, pulmonary hypertension, mild tricuspid regurgitation, moderate right atrial enlargement  IV Lasix, replace electrolytes as needed   ASA, statin. To start GDMT when close to euvolemia. Will need Lifevest prior to discharge  Will need ischemic work up in the future  Continue therapeutic dose Lovenox.   Bilateral upper and lower extremity Doppler ultrasound negative for deep or superficial vein thrombosis   Appreciate input of consultants    DVT Prophylaxis: on Lovenox  CODE STATUS: full code    Patient condition: Critical  Critical Care diagnosis:  Acute respiratory failure, volume overload, acute PE, acute CHF exacerbation  Critical care time: 45 minutes    Anticipated discharge and disposition: TBD  __________________________________________________________________________    All diagnosis and differential diagnosis have been reviewed; assessment and plan have been documented. I have personally reviewed the test results that are presently available; I have reviewed the patient's medication list. I have reviewed the consulting providers' recommendations. I have reviewed or attempted to review medical records based upon their availability.  All of the patient's questions have been addressed and answered. Patient is agreeable to the above stated plan. I will continue to monitor closely and make adjustment to medical management as needed.    This document was created with the assistance of a voice recognition software. There may be transcription errors as a result of using this technology, however minimal. Effort has been made to ensure accuracy of transcription but any obvious errors or omissions should be clarified with the author of this document.        Kwasi Thao MD   [facility]  08/15/2022

## 2022-08-15 NOTE — PROGRESS NOTES
[facility] - 3rd Floor Medical Telemetry  Pulmonary Critical Care Note    Patient Name: Harpreet Rodas  MRN: [id number]  Admission Date: 8/13/2022  Hospital Length of Stay: 2 days  Code Status: Full Code  Attending Provider: Ana Rosa Fry MD  Primary Care Provider: Primary Doctor No     Subjective:     HPI:   This is a 56-year-old male who was admitted to [facility] on 08/13/2022 for complaints of worsening shortness of breath which has been ongoing over the last several months left-sided chest pain, and significant swelling of his right arm and bilateral feet.  Patient has history of drinking and smoking quit approximately 2 months ago prior to that was drinking 4-5 40 oz beer a weekly.  Workup revealed significantly elevated BNP of 3691.5, troponin 0.134, and CT imaging of the chest with a left lower lobe pulmonary thrombus, large right pleural effusion, and emphysematous changes throughout the lungs.  Pulmonary is being consulted for consideration of thoracentesis.  Patient was initiated on Lovenox b.i.d. secondary to pulmonary emboli.  Echocardiogram appears to be pending.      24 Hour Interval History:  This AM; patient on 2L nasal canula resting comfortably. During conversation he becomes slightly dsypneic though maintain saturations of 94%. Pending official TTE, B/L LE/UE U/S NIVA reads. He reports noticing overall slight difficulty with deep inspiration with associated wheezing and intermittent left anterior chest pain with deep inspiration. He has also noticed mild lightheadedness and sore throat.  He denies any cough, sputum production, hemoptysis, nausea/vomiting, hematemesis, or fever/chills.    Further conversation revealed patient had been experiencing difficulty breathing approximately 6 months ago after inhalation of a combination of (murratic acid, sulfuric acid, bleach) while at work as a . Has been utilizing albuterol inhaler x2-3/day. Tried Trilegy x14 days without  much relief. Approximately 3 months ago he began experiencing swelling in his B/L lower extremities in addition to early satiety.  He denied any associated weight loss stating he has always been a very thin person or fever/night sweats.  Denies any prior blood clots in legs/arm/lungs or any known cancers.  Mother and brother both passed away from unspecified cancer.      Patient has been exposed to a variety of pulmonary irritants/carcinogenic agents as listed below;  - Tobacco Use (approximately x70 pack years; quit x3 months ago)  - Asbestos (Approximately 10-15 years)  - Sandblasting  - Glenn dust  - Harsh chemicals (Murratic Acid, Sulfuric Acid, Bleach; without appropriate respirator use)    Past Medical History:   Diagnosis Date    COPD (chronic obstructive pulmonary disease)        Past Surgical History:   Procedure Laterality Date    MANDIBLE FRACTURE SURGERY         Social History     Socioeconomic History    Marital status:    Tobacco Use    Smoking status: Former Smoker     Packs/day: 2.00     Quit date: 2022     Years since quittin.1    Smokeless tobacco: Never Used   Substance and Sexual Activity    Alcohol use: Not Currently     Comment: quit 2 months ago    Drug use: Never       Current Outpatient Medications   Medication Instructions    albuterol sulfate (INV ALBUTEROL) 90 mcg inhalation Inhalation, As needed (PRN), Take one puff by mouth as directed by Physician.       Current Inpatient Medications   aspirin  325 mg Oral Daily    enoxaparin  60 mg Subcutaneous Q12H    famotidine  20 mg Oral BID    furosemide (LASIX) injection  20 mg Intravenous Q6H    levoFLOXacin  750 mg Intravenous Q24H    magnesium oxide  400 mg Oral BID    magnesium sulfate IVPB  2 g Intravenous Q2H       Review of Systems   Constitutional: Positive for weight loss.   HENT: Negative.    Respiratory: Positive for shortness of breath.    Cardiovascular: Positive for orthopnea and leg swelling.    Gastrointestinal: Positive for diarrhea.   Skin: Negative.           Objective:       Intake/Output Summary (Last 24 hours) at 8/15/2022 1002  Last data filed at 8/15/2022 0600  Gross per 24 hour   Intake 240 ml   Output 3275 ml   Net -3035 ml         Vital Signs (Most Recent):  Temp: 96.7 °F (35.9 °C) (08/15/22 0732)  Pulse: 91 (08/15/22 0732)  Resp: 20 (08/15/22 0732)  BP: 100/69 (08/15/22 0732)  SpO2: (!) 93 % (08/15/22 0732)  Body mass index is 24.14 kg/m².  Weight: 78.5 kg (173 lb 1 oz) Vital Signs (24h Range):  Temp:  [96.7 °F (35.9 °C)-97.8 °F (36.6 °C)] 96.7 °F (35.9 °C)  Pulse:  [87-99] 91  Resp:  [18-22] 20  SpO2:  [93 %-98 %] 93 %  BP: (100-142)/(62-83) 100/69     Physical Exam  Constitutional:       General: He is not in acute distress.     Appearance: He is ill-appearing. He is not toxic-appearing.      Comments: Pleasant, cachetic gentleman   HENT:      Head: Normocephalic and atraumatic.      Mouth/Throat:      Mouth: Mucous membranes are moist.      Pharynx: Oropharynx is clear. No oropharyngeal exudate or posterior oropharyngeal erythema.   Eyes:      General: No scleral icterus.  Cardiovascular:      Rate and Rhythm: Normal rate and regular rhythm.      Heart sounds: Normal heart sounds.   Pulmonary:      Effort: Pulmonary effort is normal.      Breath sounds: Wheezing present.      Comments: Diminished breath sounds throughout right lung fields on anterior auscultation  Abdominal:      General: Bowel sounds are normal.      Palpations: Abdomen is soft.   Musculoskeletal:      Right lower leg: Edema present.      Left lower leg: Edema present.      Comments: 3+ edema to B/L LEs w/ dependent edema to T4-T5 B/L; Right arm with significant edema.    Neurological:      General: No focal deficit present.      Mental Status: He is alert and oriented to person, place, and time.   Psychiatric:         Mood and Affect: Mood normal.         Behavior: Behavior normal.       Lines/Drains/Airways      Peripheral Intravenous Line  Duration                Peripheral IV - Single Lumen 08/13/22 1232 18 G Left Antecubital 1 day                Significant Labs:    Lab Results   Component Value Date    WBC 8.0 08/15/2022    HGB 13.5 (L) 08/15/2022    HCT 44.2 08/15/2022    .7 (H) 08/15/2022     (L) 08/15/2022   BNP of 3691.5, troponin 0.134      BMP  Lab Results   Component Value Date     08/15/2022    K 3.5 08/15/2022    CO2 37 (H) 08/15/2022    BUN 38.1 (H) 08/15/2022    CREATININE 0.86 08/15/2022    CALCIUM 8.0 (L) 08/15/2022   Magnesium 1.5, phosphorus 5.7    ABG  Recent Labs   Lab 08/13/22  1247   PH 7.302*   PO2 94   PCO2 59.6*   HCO3 29.4*   BE 3       Significant Imaging:  US Abdomen Complete  Narrative: EXAMINATION:  US ABDOMEN COMPLETE    CLINICAL HISTORY:  possible liver cirrhosis on CT abdomen;    COMPARISON:  No previous studies are available for comparison.    FINDINGS:  Grayscale, color and spectral Doppler evaluation of the abdomen.    Pancreas obscured.    Visualized portions of the aorta and inferior vena cava are normal in caliber.    The liver is not significantly enlarged.  No focal suspicious liver lesion.  Mild surface lobulation of the liver.  Normal hepatopetal flow is noted in the portal vein.    No gallstones are seen.  Gallbladder is contracted.  Gallbladder wall is thickened and edematous.  The common bile duct is normal in caliber  and measures 3 mm.    The right kidney measures 9 cm, while the left measures 9.5 cm.  There is no hydronephrosis or solid renal mass.    Spleen measures 8-9 cm which is normal.    Small volume ascites.  There is also a right pleural effusion.  Impression: 1. Mild surface lobulation of the liver which could be seen with chronic liver disease.  No focal suspicious liver lesion.  2. Edematous gallbladder wall could relate to fluid overload.  No gallstones are seen.  3. Small volume ascites with a right pleural effusion.    Electronically signed  by: Castillo Champion  Date:    08/14/2022  Time:    13:20      Assessment/Plan:     Assessment  1. Left Lower Lobe Pulmonary Embolism  2. Large Right-sided pleural effusion w/ Atelectasis  3. HFrEF (EF of 15% per preliminary read; pending official read) w/ elevated BNP  4. RUE Edema  5. Hx of COPD (unquantified)  6. History of tobacco and alcohol abuse  7. Hx of Multiple Pulmonary Irritants as listed in above HPI  8. Cachectic w/ x3 months early satiety  9. Complaints of diarrhea on admit C diff pending - diarrhea resolved    Plan  - Continue O2 supplementation (currently 2L NC)   -- Wean as tolerated to maintain O2 sats >90%  - Would continue FD lovenox at this time in setting of Pulmonary Embolism   -- Will discuss possibility of pursuing therapeutic/diagnostic thoracentesis with pulmonologist   -- If thoracentesis is pursued; will need to hold Lovenox x24 hours  - Initial B/L LE & RUE U/S NIVA negative, pending official read; would continue current AC  - TTE preliminary w/ EF of 15% w/ diastolic dysfunction; pending official read   -- Would continue diuresis as tolerated   -- Strict I's & O's (- 3.5L since admission)  - Further Recommendations per Pulmonologist      Polo Parnell MD   Internal Medicine PGY-II  Pulmonary Service    Attending Addendum to Follow

## 2022-08-15 NOTE — PLAN OF CARE
Problem: Occupational Therapy  Goal: Occupational Therapy Goal  Description: Goals to be met by: 8/29/22    Patient will increase functional independence with ADLs by performing:    LE Dressing with Stand-by Assistance.  Grooming while standing at sink with Stand-by Assistance.  Toileting from toilet with Stand-by Assistance for hygiene and clothing management.     Outcome: Ongoing, Progressing

## 2022-08-15 NOTE — NURSING
Held Lovenox dose @ 4am, rescheduled it for 9am; pulm to see pt and decide if having thoracentesis. Relayed this info to day shift nurse

## 2022-08-15 NOTE — CONSULTS
Inpatient consult to Cardiology  Consult performed by: ART De Oliveira  Consult ordered by: ART Chavez        [facility] - 3rd Floor Medical Telemetry  Cardiology  Consult Note    Patient Name: Harpreet Rodas  MRN: [id number]  Admission Date: 8/13/2022  Hospital Length of Stay: 2 days  Code Status: Full Code   Attending Provider: Ana Rosa Fry MD   Consulting Provider: ART De Oliveira  Primary Care Physician: Primary Doctor No  Principal Problem:Anasarca    Patient information was obtained from patient and ER records.     Subjective:     Chief Complaint:       HPI:   Mr. Mayen is a 57y/o male, unknown to [facility], with PMHx significant for COPD and previous heavy smoker/drinker who presented to [facility] via EMS for c/o SOB, MON, orthopnea, peripheral edema, diarrhea, and chest pain described as sticking sensation. No radiation or associated symptoms.  RA sat upon EMS arrival-88% and he was placed on Bipap. In ED, workup significant for BUN/creatinine 45/1.36, K 6.1, D-dimer 3.05, BNP 3280, Troponin 0.134-0.120-0.105. CXR revealing bilateral pleural effusions. Right> left with possible RLL opacification. CTA chest obtained LLL PE, large right pleural effusion, and findings of anasarca. EF revealing EF 15%, Grade III LVDD and RA/RV enlargement. He was placed on diuretics with consult placed to [facility] for PVC and 2nd degree Type II AVB. Review of tele reveals SR with non-conducted PACs. No 2nd degree heart block noted.     PMH: COPD  PSH: mandible fx surgery,  Family History: Mother- cancer; brother- cancer  Social History: previous heavy smoker (2ppd)/drinker- quit 6/22    Previous Cardiac Diagnostics:   Venous US BUE 08/14/22:  No evidence of deep or superficial vein thrombosis in bilateral upper extremities.     Venous US BLE 08/14/22:  Negative DVT to BLE    TTE 08/13/22:  Severely decreased Systolic function. Estimated EF 15%. Grade III LVDD. There is pulmonary HTN, Mild TR. Moderate RV  enlargement. Moderate RA enlargement. Intermediate CVP 8 mmHg. Estimated PASP 50mmHg    Review of Systems   Constitutional: Positive for activity change and fatigue.   Respiratory: Positive for shortness of breath.    Cardiovascular: Positive for leg swelling. Negative for chest pain.   Gastrointestinal: Positive for abdominal distention.   Genitourinary: Positive for scrotal swelling.   Neurological: Negative.    Psychiatric/Behavioral: Negative.        Objective:     Vital Signs (Most Recent):  Temp: 97.8 °F (36.6 °C) (08/15/22 0416)  Pulse: 94 (08/15/22 0416)  Resp: 18 (08/15/22 0416)  BP: 103/74 (08/15/22 0416)  SpO2: 97 % (08/15/22 0416) Vital Signs (24h Range):  Temp:  [97.2 °F (36.2 °C)-97.8 °F (36.6 °C)] 97.8 °F (36.6 °C)  Pulse:  [87-99] 94  Resp:  [18-22] 18  SpO2:  [94 %-98 %] 97 %  BP: (103-142)/(62-83) 103/74     Weight: 78.5 kg (173 lb 1 oz)  Body mass index is 24.14 kg/m².    SpO2: 97 %  O2 Device (Oxygen Therapy): nasal cannula      Intake/Output Summary (Last 24 hours) at 8/15/2022 0721  Last data filed at 8/15/2022 0500  Gross per 24 hour   Intake 240 ml   Output 1975 ml   Net -1735 ml       Lines/Drains/Airways     Peripheral Intravenous Line  Duration                Peripheral IV - Single Lumen 08/13/22 1232 18 G Left Antecubital 1 day                Significant Labs:  Recent Results (from the past 72 hour(s))   POCT ARTERIAL BLOOD GAS Blood Gas    Collection Time: 08/13/22 12:47 PM   Result Value Ref Range    POC PH 7.302     POC PCO2 59.6     POC PO2 94     POC Sodium      POC Potassium      POC Ionized Calcium      POC HEMOGLOBIN      POC O2Hb      POC COHb      POC MetHb      POC PCO2      Base Excess, Arterial 3.0 (A) -2.0 - 2.0 mmol/L    O2 Sat, Art 96     HCO3, Arterial 29.4 (A) 18.0 - 23.0 MMOL/L   ISTAT PROCEDURE    Collection Time: 08/13/22 12:47 PM   Result Value Ref Range    POC PH 7.302 (L) 7.35 - 7.45    POC PCO2 59.6 (HH) 35 - 45 mmHg    POC PO2 94 80 - 100 mmHg    POC HCO3 29.4  (H) 24 - 28 mmol/L    POC BE 3 -2 to 2 mmol/L    POC SATURATED O2 96 95 - 100 %    POC TCO2 31 (H) 23 - 27 mmol/L    Sample ARTERIAL    COVID/FLU A&B PCR    Collection Time: 08/13/22 12:54 PM   Result Value Ref Range    Influenza A PCR Not Detected Not Detected    Influenza B PCR Not Detected Not Detected    SARS-CoV-2 PCR Not Detected Not Detected   Urinalysis, Reflex to Urine Culture Urine, Clean Catch    Collection Time: 08/13/22 12:54 PM    Specimen: Urine, Clean Catch   Result Value Ref Range    Color, UA Yellow Yellow, Colorless, Other, Clear    Appearance, UA SL CLOUDY (A) Clear    Specific Gravity, UA 1.025     pH, UA 5.5 5.0, 5.5, 6.0, 6.5, 7.0, 7.5, 8.0, 8.5    Protein,   (A) Negative, 300  mg/dL    Glucose, UA Negative Negative, Normal mg/dL    Ketones, UA Negative Negative, +1, +2, +3, +4, +5, >=160, >=80 mg/dL    Blood, UA Moderate (A) Negative unit/L    Bilirubin, UA Negative Negative mg/dL    Urobilinogen, UA 0.2 0.2, 1.0, Normal mg/dL    Nitrites, UA Negative Negative    Leukocyte Esterase, UA Negative Negative, 75  unit/L   Urinalysis, Microscopic    Collection Time: 08/13/22 12:54 PM   Result Value Ref Range    Bacteria, UA Few (A) None Seen, Rare, Occasional /HPF    Hyaline Casts, UA Few (A) None Seen /HPF    Sperm, UA Few (A) None Seen /HPF    RBC, UA 3-5 None Seen, 0-2, 3-5, 0-5 /HPF    WBC, UA 11-20 (A) None Seen, 0-2, 3-5, 0-5 /HPF    Squamous Epithelial Cells, UA Rare None Seen, Rare, Occasional, Occ /HPF   Urine culture    Collection Time: 08/13/22 12:54 PM    Specimen: Urine, Clean Catch   Result Value Ref Range    Urine Culture No Growth At 24 Hours    BNP    Collection Time: 08/13/22 12:55 PM   Result Value Ref Range    Natriuretic Peptide 3,280.3 (H) <=100.0 pg/mL   CBC with Differential    Collection Time: 08/13/22 12:55 PM   Result Value Ref Range    WBC 14.7 (H) 4.5 - 11.5 x10(3)/mcL    RBC 5.69 4.70 - 6.10 x10(6)/mcL    Hgb 17.6 14.0 - 18.0 gm/dL    Hct 54.9 (H) 42.0 - 52.0 %     MCV 96.5 (H) 80.0 - 94.0 fL    MCH 30.9 27.0 - 31.0 pg    MCHC 32.1 (L) 33.0 - 36.0 mg/dL    RDW 13.7 11.5 - 17.0 %    Platelet 195 130 - 400 x10(3)/mcL    MPV 11.2 (H) 7.4 - 10.4 fL    Neut % 84.4 %    Lymph % 10.7 %    Mono % 4.4 %    Eos % 0.0 %    Basophil % 0.1 %    Lymph # 1.58 0.6 - 4.6 x10(3)/mcL    Neut # 12.4 (H) 2.1 - 9.2 x10(3)/mcL    Mono # 0.64 0.1 - 1.3 x10(3)/mcL    Eos # 0.00 0 - 0.9 x10(3)/mcL    Baso # 0.02 0 - 0.2 x10(3)/mcL    IG# 0.06 (H) 0 - 0.04 x10(3)/mcL    IG% 0.4 %    NRBC% 0.1 %   Troponin I    Collection Time: 08/13/22 12:55 PM   Result Value Ref Range    Troponin-I 0.134 (H) 0.000 - 0.045 ng/mL   APTT    Collection Time: 08/13/22 12:55 PM   Result Value Ref Range    PTT 31.9 23.4 - 33.9 seconds   Lipase    Collection Time: 08/13/22 12:55 PM   Result Value Ref Range    Lipase Level 17 <=60 U/L   Magnesium    Collection Time: 08/13/22 12:55 PM   Result Value Ref Range    Magnesium Level 1.80 1.60 - 2.60 mg/dL   Lactic Acid, Plasma    Collection Time: 08/13/22 12:55 PM   Result Value Ref Range    Lactic Acid Level 3.6 (HH) 0.5 - 2.2 mmol/L   Comprehensive Metabolic Panel    Collection Time: 08/13/22 12:55 PM   Result Value Ref Range    Sodium Level 140 136 - 145 mmol/L    Potassium Level 6.1 (H) 3.5 - 5.1 mmol/L    Chloride 98 98 - 107 mmol/L    Carbon Dioxide 28 22 - 29 mmol/L    Glucose Level 136 (H) 74 - 100 mg/dL    Blood Urea Nitrogen 45.0 (H) 8.4 - 25.7 mg/dL    Creatinine 1.36 (H) 0.73 - 1.18 mg/dL    Calcium Level Total 8.9 8.4 - 10.2 mg/dL    Protein Total 6.7 6.4 - 8.3 gm/dL    Albumin Level 2.9 (L) 3.5 - 5.0 gm/dL    Globulin 3.8 (H) 2.4 - 3.5 gm/dL    Albumin/Globulin Ratio 0.8 (L) 1.1 - 2.0 ratio    Bilirubin Total 1.2 <=1.5 mg/dL    Alkaline Phosphatase 85 40 - 150 unit/L    Alanine Aminotransferase 23 0 - 55 unit/L    Aspartate Aminotransferase 37 (H) 5 - 34 unit/L    eGFR 60 mls/min/1.73/m2   D-Dimer, Quantitative    Collection Time: 08/13/22 12:55 PM   Result Value Ref  Range    D-Dimer 3.05 (H) 0.00 - 0.50 ug/mL FEU   Ethanol    Collection Time: 08/13/22 12:55 PM   Result Value Ref Range    Ethanol Level <10.0 <=10.0 mg/dL   TSH    Collection Time: 08/13/22 12:55 PM   Result Value Ref Range    Thyroid Stimulating Hormone 4.4097 0.3500 - 4.9400 uIU/mL   CK    Collection Time: 08/13/22 12:55 PM   Result Value Ref Range    Creatine Kinase 85 30 - 200 U/L   Ammonia    Collection Time: 08/13/22  1:00 PM   Result Value Ref Range    Ammonia Level 26.9 18.0 - 72.0 umol/L   Blood Culture    Collection Time: 08/13/22  1:14 PM    Specimen: Arm, Left; Blood   Result Value Ref Range    CULTURE, BLOOD ([facility]) No Growth At 24 Hours    Blood Culture    Collection Time: 08/13/22  1:14 PM    Specimen: Hand, Left; Blood   Result Value Ref Range    CULTURE, BLOOD ([facility]) No Growth At 24 Hours    Drug Screen, Urine    Collection Time: 08/13/22  1:35 PM   Result Value Ref Range    Amphetamines, Urine Negative Negative    Barbituates, Urine Negative Negative    Benzodiazepine, Urine Negative Negative    Cannabinoids, Urine Negative Negative    Cocaine, Urine Negative Negative    MDMA, Urine Negative Negative    Opiates, Urine Negative Negative    Phencyclidine, Urine Negative Negative    pH, Urine 5.5 3.0 - 11.0    Specific Gravity, Urine Auto 1.025 1.001 - 1.035   BNP    Collection Time: 08/14/22  6:58 AM   Result Value Ref Range    Natriuretic Peptide 3,691.5 (H) <=100.0 pg/mL   CBC Without Differential    Collection Time: 08/14/22  6:58 AM   Result Value Ref Range    WBC 10.7 4.5 - 11.5 x10(3)/mcL    RBC 4.97 4.70 - 6.10 x10(6)/mcL    Hgb 15.3 14.0 - 18.0 gm/dL    Hct 51.3 42.0 - 52.0 %    Platelet 153 130 - 400 x10(3)/mcL    .2 (H) 80.0 - 94.0 fL    MCH 30.8 27.0 - 31.0 pg    MCHC 29.8 (L) 33.0 - 36.0 mg/dL    RDW 13.6 11.5 - 17.0 %    MPV 10.5 (H) 7.4 - 10.4 fL    NRBC% 0.0 %   CK-MB    Collection Time: 08/14/22  6:58 AM   Result Value Ref Range    Creatine Kinase MB 4.8 <=7.2 ng/mL    Comprehensive Metabolic Panel    Collection Time: 08/14/22  6:58 AM   Result Value Ref Range    Sodium Level 139 136 - 145 mmol/L    Potassium Level 5.2 (H) 3.5 - 5.1 mmol/L    Chloride 98 98 - 107 mmol/L    Carbon Dioxide 33 (H) 22 - 29 mmol/L    Glucose Level 96 74 - 100 mg/dL    Blood Urea Nitrogen 43.2 (H) 8.4 - 25.7 mg/dL    Creatinine 1.09 0.73 - 1.18 mg/dL    Calcium Level Total 8.6 8.4 - 10.2 mg/dL    Protein Total 5.4 (L) 6.4 - 8.3 gm/dL    Albumin Level 2.6 (L) 3.5 - 5.0 gm/dL    Globulin 2.8 2.4 - 3.5 gm/dL    Albumin/Globulin Ratio 0.9 (L) 1.1 - 2.0 ratio    Bilirubin Total 0.7 <=1.5 mg/dL    Alkaline Phosphatase 69 40 - 150 unit/L    Alanine Aminotransferase 21 0 - 55 unit/L    Aspartate Aminotransferase 25 5 - 34 unit/L    eGFR >60 mls/min/1.73/m2   Lactic Acid, Plasma    Collection Time: 08/14/22  6:58 AM   Result Value Ref Range    Lactic Acid Level 1.6 0.5 - 2.2 mmol/L   Magnesium    Collection Time: 08/14/22  6:58 AM   Result Value Ref Range    Magnesium Level 1.50 (L) 1.60 - 2.60 mg/dL   Phosphorus    Collection Time: 08/14/22  6:58 AM   Result Value Ref Range    Phosphorus Level 5.7 (H) 2.3 - 4.7 mg/dL   Troponin I    Collection Time: 08/14/22  1:16 PM   Result Value Ref Range    Troponin-I 0.097 (H) 0.000 - 0.045 ng/mL   Echo    Collection Time: 08/14/22  3:20 PM   Result Value Ref Range    BSA 1.75 m2    TDI SEPTAL 0.05 m/s    LV LATERAL E/E' RATIO 16.80 m/s    LV SEPTAL E/E' RATIO 16.80 m/s    Right Atrial Pressure (from IVC) 8 mmHg    EF 15 %    Left Ventricular Outflow Tract Mean Velocity 0.347 cm/s    Left Ventricular Outflow Tract Mean Gradient 1.00 mmHg    TDI LATERAL 0.05 m/s    LVIDd 5.60 3.5 - 6.0 cm    IVS 1.11 (A) 0.6 - 1.1 cm    Posterior Wall 1.05 0.6 - 1.1 cm    LVIDs 4.79 (A) 2.1 - 4.0 cm    FS 14 28 - 44 %    LV mass 243.27 g    LA size 4.00 cm    TAPSE 0.97 cm    Left Ventricle Relative Wall Thickness 0.38 cm    AV mean gradient 3 mmHg    AV valve area 1.24 cm2    AV Velocity  Ratio 0.46     AV index (prosthetic) 0.39     MV mean gradient 1 mmHg    MV valve area p 1/2 method 3.33 cm2    MV valve area by continuity eq 1.12 cm2    E/A ratio 3.11     Mean e' 0.05 m/s    E wave deceleration time 190 msec    LVOT diameter 2.00 cm    LVOT area 3.1 cm2    LVOT peak destin 0.57 m/s    LVOT peak VTI 7.30 cm    Ao peak destin 1.23 m/s    Ao VTI 18.5 cm    LVOT stroke volume 22.92 cm3    AV peak gradient 6 mmHg    MV peak gradient 3 mmHg    TV rest pulmonary artery pressure 50 mmHg    E/E' ratio 16.80 m/s    MV Peak E Destin 0.84 m/s    TR Max Destin 3.23 m/s    MV VTI 20.4 cm    MV stenosis pressure 1/2 time 66 ms    MV Peak A Destin 0.27 m/s    LV Systolic Volume 107.00 mL    LV Systolic Volume Index 59.8 mL/m2    LV Diastolic Volume 154.00 mL    LV Diastolic Volume Index 86.03 mL/m2    LV Mass Index 136 g/m2    Triscuspid Valve Regurgitation Peak Gradient 42 mmHg   Troponin I    Collection Time: 08/14/22  6:36 PM   Result Value Ref Range    Troponin-I 0.120 (H) 0.000 - 0.045 ng/mL   Troponin I    Collection Time: 08/15/22  2:50 AM   Result Value Ref Range    Troponin-I 0.105 (H) 0.000 - 0.045 ng/mL   CBC with Differential    Collection Time: 08/15/22  3:29 AM   Result Value Ref Range    WBC 8.0 4.5 - 11.5 x10(3)/mcL    RBC 4.39 (L) 4.70 - 6.10 x10(6)/mcL    Hgb 13.5 (L) 14.0 - 18.0 gm/dL    Hct 44.2 42.0 - 52.0 %    .7 (H) 80.0 - 94.0 fL    MCH 30.8 27.0 - 31.0 pg    MCHC 30.5 (L) 33.0 - 36.0 mg/dL    RDW 13.4 11.5 - 17.0 %    Platelet 127 (L) 130 - 400 x10(3)/mcL    MPV 10.0 7.4 - 10.4 fL    Neut % 73.0 %    Lymph % 17.4 %    Mono % 7.9 %    Eos % 1.1 %    Basophil % 0.3 %    Lymph # 1.39 0.6 - 4.6 x10(3)/mcL    Neut # 5.8 2.1 - 9.2 x10(3)/mcL    Mono # 0.63 0.1 - 1.3 x10(3)/mcL    Eos # 0.09 0 - 0.9 x10(3)/mcL    Baso # 0.02 0 - 0.2 x10(3)/mcL    IG# 0.02 0 - 0.04 x10(3)/mcL    IG% 0.3 %    NRBC% 0.0 %   Comprehensive Metabolic Panel    Collection Time: 08/15/22  3:30 AM   Result Value Ref Range     Sodium Level 141 136 - 145 mmol/L    Potassium Level 3.5 3.5 - 5.1 mmol/L    Chloride 94 (L) 98 - 107 mmol/L    Carbon Dioxide 37 (H) 22 - 29 mmol/L    Glucose Level 99 74 - 100 mg/dL    Blood Urea Nitrogen 38.1 (H) 8.4 - 25.7 mg/dL    Creatinine 0.86 0.73 - 1.18 mg/dL    Calcium Level Total 8.0 (L) 8.4 - 10.2 mg/dL    Protein Total 5.5 (L) 6.4 - 8.3 gm/dL    Albumin Level 2.6 (L) 3.5 - 5.0 gm/dL    Globulin 2.9 2.4 - 3.5 gm/dL    Albumin/Globulin Ratio 0.9 (L) 1.1 - 2.0 ratio    Bilirubin Total 0.5 <=1.5 mg/dL    Alkaline Phosphatase 86 40 - 150 unit/L    Alanine Aminotransferase 23 0 - 55 unit/L    Aspartate Aminotransferase 25 5 - 34 unit/L    eGFR >60 mls/min/1.73/m2   Phosphorus    Collection Time: 08/15/22  3:30 AM   Result Value Ref Range    Phosphorus Level 4.2 2.3 - 4.7 mg/dL   Magnesium    Collection Time: 08/15/22  3:30 AM   Result Value Ref Range    Magnesium Level 1.50 (L) 1.60 - 2.60 mg/dL       Significant Imaging:  Imaging Results          CTA Chest Abdomen Non Coronary (Final result)  Result time 08/13/22 15:55:52   Procedure changed from CTA Chest Non-Coronary (PE Study)     Final result by Basilio De Oliveira MD (08/13/22 15:55:52)                 Impression:      Left lower lobe pulmonary thromboembolism is noted.    Large right pleural effusion is present.    Findings of anasarca.    The bladder wall is prominent.  Correlate with urinalysis.    There are cortical defects of the bilateral kidneys likely related to chronic renal disease and scarring, however infarcts are less likely.    The liver appears heterogeneous which may be related to phase of contrast however is cirrhosis is not excluded.  Cardiomegaly is present.  Further evaluation may be obtained with ECHO.    Findings reported to Dr. Little prior to interpretation.      Electronically signed by: Basilio De Oliveira  Date:    08/13/2022  Time:    15:55             Narrative:    EXAMINATION:  CTA CHEST ABDOMEN NON CORONARY (XPD)    CLINICAL  HISTORY:  Pulmonary embolism (PE) suspected, positive D-dimer;    TECHNIQUE:  Axial CTA images of the chest, abdomen, and pelvis were obtained With Contrast. Sagittal and coronal reconstructed images were available for review.    Automatic exposure control was utilized to reduce the patient's radiation dose.    DLP = 582    COMPARISON:  No prior images available for comparison.    FINDINGS:  PULMONARY ARTERY: Thrombus is identified in the left lower lobe pulmonary artery.    AORTA: The thoracoabdominal aorta is normal in course and caliber. Scattered atherosclerotic disease is noted.    HEART: The heart is enlarged.  No pericardial effusion.    THYROID GLAND: The thyroid is not enlarged. There are no nodules identified.    AIRWAYS: Trachea is midline and tracheobronchial tree is patent.    LUNGS: Large right effusion with subsegmental atelectatic changes at the right base.  Emphysematous changes throughout the lungs.    THROACIC LYMPH NODES: There is no significant mediastinal, axillary or hilar lymphadenopathy.    HEPATOBILIARY: Somewhat nodular contour of the superior aspect of the liver with some heterogeneity may be related to phase of contrast versus cirrhosis.  Correlate with patient's history.  The gallbladder is normal.    SPLEEN: Normal    PANCREAS: No focal masses or ductal dilatation.    ADRENALS: No adrenal nodules.    KIDNEYS: No evidence of hydronephrosis.  Bilateral renal cortical perfusional defects likely related to scarring in chronic kidney disease.  Correlate with patient's history.  Less likely infarcts.    ABDOMINAL LYMPHADENOPATHY/RETROPERITONEUM: There is no retroperitoneal lymphadenopathy.    BOWEL: No acute bowel related abnormalities.    PELVIC VISCERA: The bladder wall is somewhat prominent.  Correlate with urinalysis.    PELVIC LYMPH NODES: No lymphadenopathy.    PERITONEUM/ BODY WALL: Diffuse body wall edema with moderate ascites noted.    SKELETAL: No aggressive appearing lytic/blastic  lesion. No acute fractures, subluxations or dislocations.                               X-Ray Chest 1 View (Final result)  Result time 08/13/22 12:44:56    Final result by Basilio De Oliveira MD (08/13/22 12:44:56)                 Impression:      Right greater than left pleural effusion with possible right lower lobe opacification.  Underlying infectious process is not excluded.  Recommend continued follow-up.      Electronically signed by: Basilio De Oliveira  Date:    08/13/2022  Time:    12:44             Narrative:    EXAMINATION:  XR CHEST 1 VIEW    CLINICAL HISTORY:  shortness of breath;    TECHNIQUE:  Single view of the chest    COMPARISON:  No prior imaging available for comparison.    FINDINGS:  Right greater than left pleural effusion with possible right lower lobe opacification.  Underlying infectious process is not excluded.  Recommend continued follow-up.                                EKG:      Telemetry:        Physical Exam  HENT:      Mouth/Throat:      Mouth: Mucous membranes are moist.      Comments: Poor dentition  Cardiovascular:      Rate and Rhythm: Normal rate and regular rhythm.      Heart sounds: Normal heart sounds.      Comments: anasarca  Pulmonary:      Breath sounds: Rales present.      Comments: Conversational dyspnea  Abdominal:      Palpations: Abdomen is soft.   Musculoskeletal:      Right lower leg: Edema present.      Left lower leg: Edema present.   Skin:     General: Skin is warm.   Neurological:      General: No focal deficit present.      Mental Status: He is alert.   Psychiatric:         Mood and Affect: Mood normal.         Home Medications:   No current facility-administered medications on file prior to encounter.     Current Outpatient Medications on File Prior to Encounter   Medication Sig Dispense Refill    albuterol sulfate (INV ALBUTEROL) 90 mcg inhalation Inhale into the lungs as needed. Take one puff by mouth as directed by Physician.         Current Inpatient  Medications:    Current Facility-Administered Medications:     acetaminophen tablet 650 mg, 650 mg, Oral, Q4H PRN, Reginald Barker MD, 650 mg at 08/15/22 0335    albuterol-ipratropium 2.5 mg-0.5 mg/3 mL nebulizer solution 3 mL, 3 mL, Nebulization, Q4H PRN, Reginald Barker MD    aspirin EC tablet 325 mg, 325 mg, Oral, Daily, Reginald Barker MD, 325 mg at 08/14/22 0836    enoxaparin injection 60 mg, 60 mg, Subcutaneous, Q12H, Ana Rosa Fry MD, 60 mg at 08/14/22 1729    famotidine tablet 20 mg, 20 mg, Oral, BID, Reginald Barker MD, 20 mg at 08/14/22 2100    furosemide injection 20 mg, 20 mg, Intravenous, Q6H, Reginald Barker MD, 20 mg at 08/15/22 0533    levoFLOXacin 750 mg/150 mL IVPB 750 mg, 750 mg, Intravenous, Q24H, Yuni Little MD, Stopped at 08/14/22 1729    magnesium oxide tablet 400 mg, 400 mg, Oral, BID, Kwasi Thao MD, 400 mg at 08/14/22 2100    melatonin tablet 6 mg, 6 mg, Oral, Nightly PRN, Reginald Barker MD    promethazine tablet 25 mg, 25 mg, Oral, Q6H PRN, Reginald Barker MD    sodium chloride 0.9% flush 10 mL, 10 mL, Intravenous, PRN, Yuni Little MD    sodium chloride 0.9% flush 10 mL, 10 mL, Intravenous, PRN, Reginald Barker MD    sodium zirconium cyclosilicate packet 10 g, 10 g, Oral, Daily, Kwasi Thao MD, 10 g at 08/14/22 0836         VTE Risk Mitigation (From admission, onward)         Ordered     enoxaparin injection 60 mg  Every 12 hours (non-standard times)         08/14/22 0518     IP VTE HIGH RISK PATIENT  Once         08/13/22 1523     Place sequential compression device  Until discontinued         08/13/22 1523     Place GIACOMO hose  Until discontinued         08/13/22 1523                Assessment:   CMO, unspecified  --EF 15%  Acute combined systolic and diastolic HF  --EF 15%; Grade III LVDD  --BNP 3280  --Anasarca  NSTEMI, Type II s/t heart failure  --Troponin 0.134->0.120->0.105  LLL PE  Left BBB  ANNETTA  --improving with diuresis  COPD  Former smoker  No hx  GIB    Plan:   Continue diuresis. Ensure accurate I&O's/daily weights  Will start GDMT once patient is closer to euvolemia  Troponin elevation with flat trend likely s/t CMO. Will plan for coronary evaluation once euvolemic pending stable renal function. Decrease aspirin to 81 mg daily. Continue weight based lovenox bid. Obtain lipid panel  Lifevest will be needed prior to DC- will discuss with lifevest rep financial assistance for lifevest due to self pay status.  No indication for mechanical thrombectomy as patient is hemodynamically stable. Continue weight based Lovenox with plans to transition to DOAC prior to DC.       Thank you for your consult.     ART De Oliveira  Cardiology  [facility] - 3rd Floor Medical Telemetry  08/15/2022 7:21 AM

## 2022-08-15 NOTE — PT/OT/SLP EVAL
Physical Therapy Evaluation    Patient Name:  Harpreet Rodas   MRN:  [id number]    Recommendations:     Discharge Recommendations:  rehabilitation facility   Discharge Equipment Recommendations: rollator, shower chair   Barriers to discharge: Inaccessible home and Decreased caregiver support    Assessment:     Harpreet Rodas is a 56 y.o. male admitted with a medical diagnosis of Anasarca,  SOB, swelling, PNA, pulmonary emboli, pleural effusion, 15% EF, needs lifevest at d/c.  He presents with the following impairments/functional limitations:  weakness, impaired endurance, impaired functional mobility, impaired self care skills, gait instability, impaired balance, impaired cardiopulmonary response to activity. Pt is normally independent and working at baseline, but has been having difficulty with SOB and endurance for the past 2 months. Pt reports he has been having a hard time just walking room to room. Currently, the pt is very deconditioned. Pt has a flight of steps to enter his home, and at this point pt would not be able to manage this. May need rehab at d/c.     Rehab Prognosis: Good; patient would benefit from acute skilled PT services to address these deficits and reach maximum level of function.    Recent Surgery: * No surgery found *      Plan:     During this hospitalization, patient to be seen 6 x/week to address the identified rehab impairments via gait training, therapeutic activities, therapeutic exercises and progress toward the following goals:    · Plan of Care Expires:  09/15/22    Subjective     Chief Complaint: SOB  Patient/Family Comments/goals: to go home  Pain/Comfort:  · Pain Rating 1: 0/10    Patients cultural, spiritual, Islam conflicts given the current situation: no    Living Environment:  One story home with 1 flight of steps to enter with B handrail. Lives with wife who recently had PNA herself.   Prior to admission, patients level of function was independent.  Equipment used at  home:  .  DME owned (not currently used): none.  Upon discharge, patient will have assistance from wife.    Objective:     Communicated with nurse prior to session.  Patient found supine with oxygen, telemetry, pulse ox (continuous)  upon PT entry to room.    General Precautions: Standard,     Orthopedic Precautions:    Braces:    Respiratory Status: Nasal cannula, flow 2 L/min  SpO2 93-94% at rest. After 15ft ambulation, SpO2 dropped to 84%. Increased O2 to 4L/minO2 and pt amb 15ft more to chair. SPO2 88%. Increased to >90% with rest and pursed lip breathing. Returned O2 back to 2L/minO2 on wall.   Exams:  · RLE ROM: Deficits: WNL  · RLE Strength: Deficits: 4/5 hip and knee extension  · LLE ROM: WNL  · LLE Strength: Deficits: 4/5 hip and knee extension    Functional Mobility:  · Bed Mobility:     · Scooting: minimum assistance  · Supine to Sit: minimum assistance  · Transfers:     · Sit to Stand:  moderate assistance with rolling walker  · Gait: 30ft with CGA-Min A, slow pace, shuffling gait pattern, small step length B.         AM-PAC 6 CLICK MOBILITY  Total Score:14     Patient left up in chair with all lines intact, call button in reach and nursing notified.    GOALS:   Multidisciplinary Problems     Physical Therapy Goals        Problem: Physical Therapy    Goal Priority Disciplines Outcome Goal Variances Interventions   Physical Therapy Goal     PT, PT/OT Ongoing, Progressing     Description: Goals to be met by: 9/15/22     Patient will increase functional independence with mobility by performin. Supine to sit with Stand-by Assistance  2. Sit to stand transfer with Stand-by Assistance  3. Gait  x 150 feet with Stand-by Assistance using Rolling Walker.   4. Ascend/descend 12 stair with right Handrails Stand-by Assistance using No Assistive Device.                      History:     Past Medical History:   Diagnosis Date    COPD (chronic obstructive pulmonary disease)        Past Surgical History:    Procedure Laterality Date    MANDIBLE FRACTURE SURGERY         Time Tracking:     PT Received On: 08/15/22  PT Start Time: 1402     PT Stop Time: 1434  PT Total Time (min): 32 min     Billable Minutes: Evaluation 32      08/15/2022

## 2022-08-15 NOTE — CONSULTS
Inpatient Nutrition Assessment    Admit Date: 8/13/2022   Length of Stay: 2 days  Nutrition Recommendation/Prescription     1. Continue diet as ordered.   2. Add Boost Plus once daily for additional nutrition (360kcals, 14g protein per serving)  3. Replete electrolytes as feasible.     Communication of Recommendations: reviewed with patient and/or caregiver    Nutrition Assessment     Malnutrition Assessment/Nutrition-Focused Physical Exam    Malnutrition in the context of acute illness or injury  Degree of Malnutrition: non-severe (moderate) malnutrition  Energy Intake: < 75% of estimated energy requirement for > 7 days, improving since admit  Interpretation of Weight Loss: does not meet criteria  Body Fat:mild depletion  Area of Body Fat Loss: orbital region   Muscle Mass Loss: mild depletion  Area of Muscle Mass Loss: temple region - temporalis muscle  Fluid Accumulation: moderate to severe  Edema: 2+ edema - mild and 3+ edema - moderate   Anasarca, 3+ pitting edema right upper extremity and bilateral lower extremity to, + scrotal edema  Reduced  Strength: unable to obtain  A minimum of two characteristics is recommended for diagnosis of either severe or non-severe malnutrition.    Chart Review    Reason Seen: malnutrition screening tool and physician consult    Diagnosis:  CMO, unspecified  Acute combined systolic and diastolic HF  NSTEMI, type II s/y heart failure  LLL PE  Left BBB  ANNETTA  COPD    Relevant Medical History: COPD, previous heavy smoker/drinker    Nutrition-Related Medications: famotidine, furosemide, magnesium oxide.  Calorie Containing IV Medications: no significant kcals from medications at this time    Nutrition-Related Labs:  8/15 Cl 94, CO2 37, BUN 38.1, Ca 8, Mg 1.5, Total pro 5.5, Alb 2.6    Current Diet/PN: Diet heart healthy  Current Oral Supplements: none at this time  Current Tube Feeding: none at this time  Appetite/Oral Intake: good/% of meals  Factors Affecting Nutritional  "Intake: abdominal distension, decreased appetite and early satiety  Food/Cheondoism/Cultural Preferences: none reported       Wound(s):   none documented    Comments    8/15 Pt seen for diet education and MST score- decreased appetite and reported 14-23lb wt loss PTA. Reports decreased appetite, early satiety and swelling began 1-2 months ago.   Eating % meals since admit and willing to trial Boost daily for additional nutrition. Reports UBW around 135-145lbs, current wt ~170lbs likely s/t fluid. On diuretics, anticipate decrease in wt during hospital stay. Magnesium being replaced.    Anthropometrics    Height: 5' 11" (180.3 cm) Height Method: Stated  Weight: 78.5 kg (173 lb 1 oz) Weight Method: Stated  BMI (Calculated): 24.1  BMI Classification: normal (BMI 18.5-24.9)        Ideal Body Weight (IBW), Male: 172 lb  % Ideal Body Weight, Male (lb): 78.49 %        Usual Body Weight (UBW), k.36 kg (135-145lb per pt)  % Usual Body Weight: 128.2  (usual weight provided by patient)    Wt Readings from Last 5 Encounters:   08/15/22 78.5 kg (173 lb 1 oz)     Weight Change(s) Since Admission:  Admit Weight: 61.2 kg (135 lb)    Estimated Needs    Weight Used For Calorie Calculations: 78.5 kg (173 lb 1 oz) (Dry UBW)  Energy Calorie Requirements (kcal): 1952kcals/d (MSJ x 1.2SF)  Energy Need Method: [facility]  Weight Used For Protein Calculations: 78.5 kg (173 lb 1 oz)  Protein Requirements: 94g/d (1.2g/kg)     Temp: 97.7 °F (36.5 °C)       Enteral Nutrition    Patient not receiving enteral nutrition at this time.    Parenteral Nutrition    Patient not receiving parenteral nutrition support at this time.    Evaluation of Received Nutrient Intake    Calories: meeting estimated needs  Protein: meeting estimated needs    Patient Education    Education Provided: low sodium diet  Teaching Method: explanation and printed materials  Comprehension: verbalizes understanding  Barriers to Learning: none evident  Expected " Compliance: good  Comments: All questions were answered and dietitian's contact information was provided.       Nutrition Diagnosis     PES: Malnutrition related to current medical condition as evidenced by <75% nutritional needs >1 wk, physical evidence of muscle and fat loss, moderate to severe fluid retention. (new)    Interventions/Goals     Intervention(s): modified composition of meals/snacks, commercial beverage, multivitamin/mineral supplement therapy, purpose of nutrition education and collaboration with other providers  Goal: Meet greater than 75% of nutritional needs. (new)    Monitoring & Evaluation     Dietitian will monitor food and beverage intake, weight change, electrolyte and renal panel and food and nutrition knowledge skill.  Nutrition Risk/Follow-Up: moderate (follow-up in 3-5 days)

## 2022-08-16 LAB
ANION GAP SERPL CALC-SCNC: 11 MEQ/L
BASOPHILS # BLD AUTO: 0.03 X10(3)/MCL (ref 0–0.2)
BASOPHILS NFR BLD AUTO: 0.4 %
BUN SERPL-MCNC: 26.2 MG/DL (ref 8.4–25.7)
CALCIUM SERPL-MCNC: 7.8 MG/DL (ref 8.4–10.2)
CHLORIDE SERPL-SCNC: 88 MMOL/L (ref 98–107)
CHOLEST SERPL-MCNC: 104 MG/DL
CHOLEST/HDLC SERPL: 3 {RATIO} (ref 0–5)
CO2 SERPL-SCNC: 41 MMOL/L (ref 22–29)
CORRECTED TEMPERATURE (PCO2): 90 MMHG (ref 19–50)
CORRECTED TEMPERATURE (PH): 7.39 (ref 7.35–7.45)
CORRECTED TEMPERATURE (PO2): 64 MMHG (ref 80–100)
CREAT SERPL-MCNC: 0.7 MG/DL (ref 0.73–1.18)
CREAT/UREA NIT SERPL: 37
EOSINOPHIL # BLD AUTO: 0.12 X10(3)/MCL (ref 0–0.9)
EOSINOPHIL NFR BLD AUTO: 1.6 %
ERYTHROCYTE [DISTWIDTH] IN BLOOD BY AUTOMATED COUNT: 13.3 % (ref 11.5–17)
GFR SERPLBLD CREATININE-BSD FMLA CKD-EPI: >60 MLS/MIN/1.73/M2
GLUCOSE SERPL-MCNC: 70 MG/DL (ref 74–100)
HAV IGM SERPL QL IA: NONREACTIVE
HBV CORE IGM SERPL QL IA: NONREACTIVE
HBV SURFACE AG SERPL QL IA: NONREACTIVE
HCO3 UR-SCNC: 54.5 MMOL/L
HCT VFR BLD AUTO: 40.5 % (ref 42–52)
HCV AB SERPL QL IA: NONREACTIVE
HDLC SERPL-MCNC: 33 MG/DL (ref 35–60)
HGB BLD-MCNC: 13 GM/DL (ref 14–18)
HGB BLD-MCNC: 13.3 G/DL (ref 12–16)
HIV 1+2 AB+HIV1 P24 AG SERPL QL IA: NONREACTIVE
IMM GRANULOCYTES # BLD AUTO: 0.03 X10(3)/MCL (ref 0–0.04)
IMM GRANULOCYTES NFR BLD AUTO: 0.4 %
LDLC SERPL CALC-MCNC: 57 MG/DL (ref 50–140)
LYMPHOCYTES # BLD AUTO: 1.36 X10(3)/MCL (ref 0.6–4.6)
LYMPHOCYTES NFR BLD AUTO: 18.4 %
MAGNESIUM SERPL-MCNC: 1.5 MG/DL (ref 1.6–2.6)
MCH RBC QN AUTO: 31.2 PG (ref 27–31)
MCHC RBC AUTO-ENTMCNC: 32.1 MG/DL (ref 33–36)
MCV RBC AUTO: 97.1 FL (ref 80–94)
MONOCYTES # BLD AUTO: 0.65 X10(3)/MCL (ref 0.1–1.3)
MONOCYTES NFR BLD AUTO: 8.8 %
NEUTROPHILS # BLD AUTO: 5.2 X10(3)/MCL (ref 2.1–9.2)
NEUTROPHILS NFR BLD AUTO: 70.4 %
NRBC BLD AUTO-RTO: 0 %
PCO2 BLDA: 90 MMHG (ref 19–50)
PH SMN: 7.39 [PH] (ref 7.35–7.45)
PLATELET # BLD AUTO: 172 X10(3)/MCL (ref 130–400)
PMV BLD AUTO: 11.9 FL (ref 7.4–10.4)
PO2 BLDA: 64 MMHG (ref 80–100)
POC BASE DEFICIT: 24 MMOL/L (ref -2–2)
POC COHB: 2.3 %
POC IONIZED CALCIUM: 1.01 MMOL/L (ref 1.12–1.23)
POC METHB: 1.2 % (ref 0.4–1.5)
POC O2HB: 91.7 % (ref 94–97)
POC SATURATED O2: 91.9 %
POC TEMPERATURE: 37 °C
POTASSIUM BLD-SCNC: 2.7 MMOL/L (ref 3.5–5)
POTASSIUM SERPL-SCNC: 3.2 MMOL/L (ref 3.5–5.1)
RBC # BLD AUTO: 4.17 X10(6)/MCL (ref 4.7–6.1)
SODIUM BLD-SCNC: 132 MMOL/L (ref 137–145)
SODIUM SERPL-SCNC: 140 MMOL/L (ref 136–145)
SPECIMEN SOURCE: ABNORMAL
T PALLIDUM AB SER QL: NONREACTIVE
TRIGL SERPL-MCNC: 71 MG/DL (ref 34–140)
VLDLC SERPL CALC-MCNC: 14 MG/DL
WBC # SPEC AUTO: 7.4 X10(3)/MCL (ref 4.5–11.5)

## 2022-08-16 PROCEDURE — 99900035 HC TECH TIME PER 15 MIN (STAT)

## 2022-08-16 PROCEDURE — 80048 BASIC METABOLIC PNL TOTAL CA: CPT | Performed by: INTERNAL MEDICINE

## 2022-08-16 PROCEDURE — 85025 COMPLETE CBC W/AUTO DIFF WBC: CPT | Performed by: INTERNAL MEDICINE

## 2022-08-16 PROCEDURE — 36415 COLL VENOUS BLD VENIPUNCTURE: CPT | Performed by: INTERNAL MEDICINE

## 2022-08-16 PROCEDURE — 97535 SELF CARE MNGMENT TRAINING: CPT | Mod: CO

## 2022-08-16 PROCEDURE — 25000003 PHARM REV CODE 250: Performed by: INTERNAL MEDICINE

## 2022-08-16 PROCEDURE — 80074 ACUTE HEPATITIS PANEL: CPT | Performed by: STUDENT IN AN ORGANIZED HEALTH CARE EDUCATION/TRAINING PROGRAM

## 2022-08-16 PROCEDURE — 25000003 PHARM REV CODE 250: Performed by: NURSE PRACTITIONER

## 2022-08-16 PROCEDURE — 86780 TREPONEMA PALLIDUM: CPT | Performed by: STUDENT IN AN ORGANIZED HEALTH CARE EDUCATION/TRAINING PROGRAM

## 2022-08-16 PROCEDURE — 97530 THERAPEUTIC ACTIVITIES: CPT

## 2022-08-16 PROCEDURE — 94761 N-INVAS EAR/PLS OXIMETRY MLT: CPT

## 2022-08-16 PROCEDURE — 36600 WITHDRAWAL OF ARTERIAL BLOOD: CPT

## 2022-08-16 PROCEDURE — 27000221 HC OXYGEN, UP TO 24 HOURS

## 2022-08-16 PROCEDURE — 87389 HIV-1 AG W/HIV-1&-2 AB AG IA: CPT | Performed by: STUDENT IN AN ORGANIZED HEALTH CARE EDUCATION/TRAINING PROGRAM

## 2022-08-16 PROCEDURE — 21400001 HC TELEMETRY ROOM

## 2022-08-16 PROCEDURE — 63600175 PHARM REV CODE 636 W HCPCS: Performed by: INTERNAL MEDICINE

## 2022-08-16 PROCEDURE — 80061 LIPID PANEL: CPT | Performed by: NURSE PRACTITIONER

## 2022-08-16 PROCEDURE — 83735 ASSAY OF MAGNESIUM: CPT | Performed by: INTERNAL MEDICINE

## 2022-08-16 PROCEDURE — 82803 BLOOD GASES ANY COMBINATION: CPT

## 2022-08-16 RX ORDER — LANOLIN ALCOHOL/MO/W.PET/CERES
400 CREAM (GRAM) TOPICAL 3 TIMES DAILY
Status: DISPENSED | OUTPATIENT
Start: 2022-08-16 | End: 2022-08-23

## 2022-08-16 RX ORDER — ACETAZOLAMIDE 500 MG/5ML
500 INJECTION, POWDER, LYOPHILIZED, FOR SOLUTION INTRAVENOUS ONCE
Status: COMPLETED | OUTPATIENT
Start: 2022-08-16 | End: 2022-08-16

## 2022-08-16 RX ORDER — POTASSIUM CHLORIDE 20 MEQ/1
40 TABLET, EXTENDED RELEASE ORAL ONCE
Status: COMPLETED | OUTPATIENT
Start: 2022-08-16 | End: 2022-08-16

## 2022-08-16 RX ORDER — MAGNESIUM SULFATE HEPTAHYDRATE 40 MG/ML
2 INJECTION, SOLUTION INTRAVENOUS
Status: COMPLETED | OUTPATIENT
Start: 2022-08-16 | End: 2022-08-16

## 2022-08-16 RX ADMIN — ATORVASTATIN CALCIUM 40 MG: 40 TABLET, FILM COATED ORAL at 08:08

## 2022-08-16 RX ADMIN — ACETAZOLAMIDE 500 MG: 500 INJECTION, POWDER, LYOPHILIZED, FOR SOLUTION INTRAVENOUS at 03:08

## 2022-08-16 RX ADMIN — FAMOTIDINE 20 MG: 20 TABLET, FILM COATED ORAL at 09:08

## 2022-08-16 RX ADMIN — Medication 400 MG: at 08:08

## 2022-08-16 RX ADMIN — Medication 400 MG: at 03:08

## 2022-08-16 RX ADMIN — POTASSIUM CHLORIDE 40 MEQ: 1500 TABLET, EXTENDED RELEASE ORAL at 09:08

## 2022-08-16 RX ADMIN — FUROSEMIDE 20 MG: 10 INJECTION, SOLUTION INTRAMUSCULAR; INTRAVENOUS at 11:08

## 2022-08-16 RX ADMIN — Medication 400 MG: at 09:08

## 2022-08-16 RX ADMIN — MAGNESIUM SULFATE HEPTAHYDRATE 2 G: 40 INJECTION, SOLUTION INTRAVENOUS at 11:08

## 2022-08-16 RX ADMIN — Medication 650 MG: at 04:08

## 2022-08-16 RX ADMIN — FUROSEMIDE 20 MG: 10 INJECTION, SOLUTION INTRAMUSCULAR; INTRAVENOUS at 12:08

## 2022-08-16 RX ADMIN — MAGNESIUM SULFATE HEPTAHYDRATE 2 G: 40 INJECTION, SOLUTION INTRAVENOUS at 09:08

## 2022-08-16 RX ADMIN — ENOXAPARIN SODIUM 80 MG: 80 INJECTION SUBCUTANEOUS at 04:08

## 2022-08-16 RX ADMIN — ASPIRIN 81 MG CHEWABLE TABLET 81 MG: 81 TABLET CHEWABLE at 09:08

## 2022-08-16 RX ADMIN — FAMOTIDINE 20 MG: 20 TABLET, FILM COATED ORAL at 08:08

## 2022-08-16 RX ADMIN — Medication 650 MG: at 10:08

## 2022-08-16 RX ADMIN — FUROSEMIDE 20 MG: 10 INJECTION, SOLUTION INTRAMUSCULAR; INTRAVENOUS at 06:08

## 2022-08-16 NOTE — PROGRESS NOTES
"[facility] MEDICINE PROGRESS NOTE      Patient Name: Harpreet Rodas  MRN: [id number]  Admission Date: 8/13/2022 12:32 PM  Status: IP- Inpatient   Length of Stay: 3 days  Face-to-Face encounter date: 08/16/2022       CHIEF COMPLAINT  Shortness of Breath (Pt to er via aasi with c/o sob and swelling to extremities.)    HOSPITAL COURSE  56 y.o. male who  has a past medical history of COPD (chronic obstructive pulmonary disease).. The patient presented to [facility] on 8/13/2022 with a primary complaint of dyspnea on exertion, edema and chest pain.      The pt says for about 6 months he has had worsening dyspnea on exertion. Currently, just walking in his living room make him extremely short of breath. He started having swelling of his right arm and both legs 3 days ago. He started having diarrhea at the same time. He has lost weight. He says he gets hungry but feels full after a few bites and the food goes right through him. He came to the ER he began having left side chest pain, "a sticking sensation". He denies associated symptoms it only lasts a short time. No radiation. No known hx of CAD. Denies fever, chills or sweats. He quit smoking and drinking about 2 months ago after his wife had pneumonia and his breathing was worsening. He was drinking 4-5 40oz beers a week prior to quitting. The pt became very short of breath when trying to get into wheelchair for chest CT. However, he was able to tolerate CT and lay flat when transported by bed. He was given lasix prior to my arrival. He says he is feeling better. He is not on home oxygen. Only home med is an inhaler.    Today (08/16/2022):  He reports shortness of breath is much better, denies chest pain. Generalized weakness improving.  He is on 2L NC oxygen. No fever. No bowel movement in 3 days.    OBJECTIVE    VITAL SIGNS: 24 HRS MIN & MAX LAST   Temp  Min: 97.5 °F (36.4 °C)  Max: 98.2 °F (36.8 °C) 98.2 °F (36.8 °C)   BP  Min: 92/62  " Max: 119/75 113/74   Pulse  Min: 84  Max: 100  87   Resp  Min: 18  Max: 20 18   SpO2  Min: 91 %  Max: 99 % 99 %       LABS/MICROBIOLOGY/MEDICATIONS/DIAGNOSTICS  I have reviewed all pertinent lab results within the past 24 hours.    Recent Labs   Lab 08/14/22  0658 08/15/22  0329 08/16/22  0353   WBC 10.7 8.0 7.4   RBC 4.97 4.39* 4.17*   HGB 15.3 13.5* 13.0*   HCT 51.3 44.2 40.5*   .2* 100.7* 97.1*   MCH 30.8 30.8 31.2*   MCHC 29.8* 30.5* 32.1*   RDW 13.6 13.4 13.3    127* 172   MPV 10.5* 10.0 11.9*       Recent Labs   Lab 08/13/22  1247 08/13/22  1247 08/13/22  1255 08/13/22  1255 08/14/22  0658 08/15/22  0330 08/16/22  0353 08/16/22  0819   NA  --   --  140   < > 139 141 140  --    K  --   --  6.1*   < > 5.2* 3.5 3.2*  --    CO2  --   --  28   < > 33* 37* 41*  --    BUN  --   --  45.0*   < > 43.2* 38.1* 26.2*  --    CREATININE  --   --  1.36*   < > 1.09 0.86 0.70*  --    CALCIUM  --   --  8.9   < > 8.6 8.0* 7.8*  --    PH 7.302 7.302*  --   --   --   --   --  7.39   MG  --   --  1.80   < > 1.50* 1.50* 1.50*  --    ALBUMIN  --   --  2.9*  --  2.6* 2.6*  --   --    ALKPHOS  --   --  85  --  69 86  --   --    ALT  --   --  23  --  21 23  --   --    AST  --   --  37*  --  25 25  --   --    BILITOT  --   --  1.2  --  0.7 0.5  --   --     < > = values in this interval not displayed.       Recent Labs     08/15/22  0329 08/16/22  0353   WBC 8.0 7.4   RBC 4.39* 4.17*   HGB 13.5* 13.0*   HCT 44.2 40.5*   .7* 97.1*   MCH 30.8 31.2*   MCHC 30.5* 32.1*   RDW 13.4 13.3     Recent Labs     08/14/22  0658 08/14/22  1316 08/14/22  1836 08/15/22  0250 08/15/22  0330 08/16/22  0353   CHLORIDE 98  --   --   --  94* 88*   CO2 33*  --   --   --  37* 41*   BUN 43.2*  --   --   --  38.1* 26.2*   CREATININE 1.09  --   --   --  0.86 0.70*   GLUCOSE 96  --   --   --  99 70*   CALCIUM 8.6  --   --   --  8.0* 7.8*   ALBUMIN 2.6*  --   --   --  2.6*  --    ALKPHOS 69  --   --   --  86  --    ALT 21  --   --   --  23  --     AST 25  --   --   --  25  --    BNP 3,691.5*  --   --   --   --   --    TROPONINI  --  0.097* 0.120* 0.105*  --   --        Microbiology Results (last 7 days)     Procedure Component Value Units Date/Time    Respiratory Culture [[id number]]     Order Status: Sent Specimen: Sputum, Expectorated     Blood Culture [[id number]]  (Normal) Collected: 08/13/22 1314    Order Status: Completed Specimen: Blood from Arm, Left Updated: 08/15/22 2100     CULTURE, BLOOD ([facility]) No Growth At 48 Hours    Blood Culture [[id number]]  (Normal) Collected: 08/13/22 1314    Order Status: Completed Specimen: Blood from Hand, Left Updated: 08/15/22 2100     CULTURE, BLOOD ([facility]) No Growth At 48 Hours    Urine culture [[id number]] Collected: 08/13/22 1254    Order Status: Completed Specimen: Urine, Clean Catch Updated: 08/15/22 0958     Urine Culture No Growth    Clostridium Diff Toxin, A & B, EIA [[id number]] Collected: 08/13/22 2310    Order Status: Sent Specimen: Stool     Stool Culture **CANNOT BE ORDERED STAT** [[id number]]     Order Status: Sent Specimen: Stool            CV Ultrasound doppler venous arms bilateral  There was no evidence of deep or superficial vein thrombosis in the   bilateral upper extremities.  CV Ultrasound doppler venous legs bilat  There was no evidence of deep or superficial vein thrombosis in the   bilateral lower extremities.        Scheduled Med:   acetaZOLAMIDE  500 mg Intravenous Once    aspirin  81 mg Oral Daily    atorvastatin  40 mg Oral QHS    enoxaparin  80 mg Subcutaneous Q12H    famotidine  20 mg Oral BID    furosemide (LASIX) injection  20 mg Intravenous Q6H    magnesium oxide  400 mg Oral TID        Continuous Infusions:       PRN Meds:  acetaminophen, albuterol-ipratropium, melatonin, promethazine, sodium chloride 0.9%, sodium chloride 0.9%       PHYSICAL EXAM  Constitutional: Appears chronically ill looking. Cachectic. No acute distress.   Head/Eyes/Ears/Nose/Mouth/Throat: Normocephalic,  atraumatic. PERRLA, EOM intact. Conjunctive clear, sclera white. Pinna no masses, lesions, tenderness, drainage. Pharynx pink.  Neck: No JVD present. Normal alignment and mobility.  Cardiovascular: Regular rate and rhythm, S1 / S2, no systolic or diastolic murmur, no rubs or gallops.  Pulmonary/Chest: Effort normal. No respiratory distress. Breath sounds decreased bilaterally, bilateral rales.   Abdominal: Flat, soft, non-tender, non-distended, no rebound tenderness or guarding, normal bowel sounds in all four quadrants.  Musculoskeletal: Extremities symmetric, no tenderness, weakness, or swelling. Maintains flexion against resistance.   Extremities: Anasarca with dependent edema to his back. 3+ pitting edema right upper extremity edema and 1+ bilateral lower extremity edema, + scrotal edema.  Neurological: Alert and oriented to person, place, and time. Cooperative. Speech clear, appropriate. No dysarthria. Cranial nerves II-XII grossly intact. No focal deficits.  Psychiatric: No depression, anxiety or agitation. Normal affect, no hallucinations or suicidal ideations, no pressured speech.      ASSESSMENT  Acute hypercapnic and hypoxic respiratory failure  Primary respiratory acidosis, Chronic, with secondary metabolic alkalosis  Left lower lobe acute pulmonary thromboembolism  Large right pleural effusion  Anasarca, volume overload  Acute onset, newly diagnosed, combined systolic and diastolic congestive heart failure exacerbation, EF 15%, grade III left ventricular diastolic dysfunction  Pulmonary hypertension  Mild tricuspid regurgitation  Hyperkalemia  Mild acute kidney injury  Possible liver cirrhosis: USS showed mild surface lobulation of the liver which could be seen with chronic liver disease.  No focal suspicious liver lesion  Acute NSTEMI, type I vs. Type II  Right lower lobe atelectasis, doubt pneumonia  Dyspnea on exertion  Diarrhea: resolved  Lactic acidosis: Resolved  COPD, emphysema  Ex-smoker, quit 2  months ago  Previous alcohol abuse  Leukocytosis  Hypomagnesemia, hypokalemia  PVCs   Left BBB  Moderate ascites     PLAN  Continue oxygen via Oxymizer, wean oxygen as tolerated, keep SaO2 greater than 88%  Bronchodilators  Blood cultures and urine culture NGTD   Off IV Levaquin  Echocardiogram showed EF 15%, grade III left ventricular diastolic dysfunction, pulmonary hypertension, mild tricuspid regurgitation, moderate right atrial enlargement  IV Lasix, replace electrolytes as needed. Give 500 mg IV Acetazolamide x 1 given metabolic alkalosis due to lasix  ASA, statin. To start GDMT when close to euvolemia. Will need Lifevest prior to discharge  Will need ischemic work up in the future  Continue therapeutic dose Lovenox. For thoracentesis tomorrow  Bilateral upper and lower extremity Doppler ultrasound negative for deep or superficial vein thrombosis   F/U HIV, Hepatitis panel, Syphillis  Appreciate input of consultants  Replace electrolytes as needed  DVT Prophylaxis: on Lovenox  CODE STATUS: full code    Patient condition: Critical  Critical Care diagnosis:  Acute respiratory failure, volume overload, acute PE, acute CHF exacerbation  Critical care time: 47 minutes    Anticipated discharge and disposition: TBD  __________________________________________________________________________    All diagnosis and differential diagnosis have been reviewed; assessment and plan have been documented. I have personally reviewed the test results that are presently available; I have reviewed the patient's medication list. I have reviewed the consulting providers' recommendations. I have reviewed or attempted to review medical records based upon their availability.  All of the patient's questions have been addressed and answered. Patient is agreeable to the above stated plan. I will continue to monitor closely and make adjustment to medical management as needed.    This document was created with the assistance of a voice  recognition software. There may be transcription errors as a result of using this technology, however minimal. Effort has been made to ensure accuracy of transcription but any obvious errors or omissions should be clarified with the author of this document.        Kwasi Thao MD   [facility]  08/16/2022

## 2022-08-16 NOTE — PT/OT/SLP PROGRESS
Physical Therapy Treatment    Patient Name:  Harpreet Rodas   MRN:  [id number]    Recommendations:     Discharge Recommendations:  rehabilitation facility   Discharge Equipment Recommendations: rollator, shower chair   Barriers to discharge: Inaccessible home    Assessment:     Harpreet Rodas is a 56 y.o. male admitted with a medical diagnosis of Anasarca.  He presents with the following impairments/functional limitations:  weakness, impaired endurance, impaired functional mobility, impaired self care skills, gait instability, impaired balance, impaired cardiopulmonary response to activity. Today pt reports he is feeling stronger with his transfers and is motivated for therapy. Pt tolerated 40ft of gait on 2L/minO2, but is very SOB with mobility. Pursed lip breathing technique used.     Rehab Prognosis: Fair; patient would benefit from acute skilled PT services to address these deficits and reach maximum level of function.    Recent Surgery: * No surgery found *      Plan:     During this hospitalization, patient to be seen 6 x/week to address the identified rehab impairments via gait training, therapeutic activities, therapeutic exercises and progress toward the following goals:    · Plan of Care Expires:  09/15/22    Subjective     Chief Complaint: fatigue  Patient/Family Comments/goals: to get stronger  Pain/Comfort:  · Pain Rating 1: 0/10      Objective:     Communicated with nurse prior to session.  Patient found supine with oxygen, pulse ox (continuous), telemetry upon PT entry to room.     General Precautions: Standard, fall   Orthopedic Precautions:    Braces:    Respiratory Status: Nasal cannula, flow 2 L/min   After ambulation and in chair, SpO2 increased to 99%.   Functional Mobility:  · Bed Mobility:     · Scooting: independence  · Supine to Sit: independence  · Transfers:     · Sit to Stand:  contact guard assistance with rolling walker  · Gait: 40ft with RW, very slow pace, two standing rest breaks for  PLB. CGA-SBA.  SOB noted, and time given to rest.      AM-PAC 6 CLICK MOBILITY  Turning over in bed (including adjusting bedclothes, sheets and blankets)?: 4  Sitting down on and standing up from a chair with arms (e.g., wheelchair, bedside commode, etc.): 3  Moving from lying on back to sitting on the side of the bed?: 3  Moving to and from a bed to a chair (including a wheelchair)?: 3  Need to walk in hospital room?: 3  Climbing 3-5 steps with a railing?: 1  Basic Mobility Total Score: 17       Patient left up in chair with all lines intact and call button in reach..    GOALS:   Multidisciplinary Problems     Physical Therapy Goals        Problem: Physical Therapy    Goal Priority Disciplines Outcome Goal Variances Interventions   Physical Therapy Goal     PT, PT/OT Ongoing, Progressing     Description: Goals to be met by: 9/15/22     Patient will increase functional independence with mobility by performin. Supine to sit with Stand-by Assistance  2. Sit to stand transfer with Stand-by Assistance  3. Gait  x 150 feet with Stand-by Assistance using Rolling Walker.   4. Ascend/descend 12 stair with right Handrails Stand-by Assistance using No Assistive Device.                      Time Tracking:     PT Received On: 22  PT Start Time: 1047     PT Stop Time: 1110  PT Total Time (min): 23 min     Billable Minutes: Therapeutic Activity 23    Treatment Type: Treatment  PT/PTA: PT     PTA Visit Number: 1     2022

## 2022-08-16 NOTE — PROGRESS NOTES
[facility] - 3rd Floor Medical Telemetry  Cardiology  Progress Note    Patient Name: Harpreet Rodas  MRN: [id number]  Admission Date: 8/13/2022  Hospital Length of Stay: 3 days  Code Status: Full Code   Attending Provider: Ana Rosa Fry MD   Consulting Provider: ART De Oliveira  Primary Care Physician: Primary Doctor No  Principal Problem:Anasarca    Patient information was obtained from patient and ER records.     Subjective:     Chief Complaint:       HPI:   Mr. Mayen is a 57y/o male, unknown to [facility], with PMHx significant for COPD and previous heavy smoker/drinker who presented to [facility] via EMS for c/o SOB, MON, orthopnea, peripheral edema, diarrhea, and chest pain described as sticking sensation. No radiation or associated symptoms.  RA sat upon EMS arrival-88% and he was placed on Bipap. In ED, workup significant for BUN/creatinine 45/1.36, K 6.1, D-dimer 3.05, BNP 3280, Troponin 0.134-0.120-0.105. CXR revealing bilateral pleural effusions. Right> left with possible RLL opacification. CTA chest obtained LLL PE, large right pleural effusion, and findings of anasarca. EF revealing EF 15%, Grade III LVDD and RA/RV enlargement. He was placed on diuretics with consult placed to [facility] for PVC and 2nd degree Type II AVB. Review of tele reveals SR with non-conducted PACs. No 2nd degree heart block noted.     Hospital Course:  8/16/22: Patient awake in bed. Good diuresis with Lasix. Still volume overloaded.     PMH: COPD  PSH: mandible fx surgery,  Family History: Mother- cancer; brother- cancer  Social History: previous heavy smoker (2ppd)/drinker- quit 6/22    Previous Cardiac Diagnostics:   Venous US BUE 08/14/22:  No evidence of deep or superficial vein thrombosis in bilateral upper extremities.     Venous US BLE 08/14/22:  Negative DVT to BLE    TTE 08/13/22:  Severely decreased Systolic function. Estimated EF 15%. Grade III LVDD. There is pulmonary HTN, Mild TR. Moderate RV enlargement.  Moderate RA enlargement. Intermediate CVP 8 mmHg. Estimated PASP 50mmHg    Review of Systems   Constitutional: Positive for activity change and fatigue.   Respiratory: Positive for shortness of breath.    Cardiovascular: Positive for leg swelling. Negative for chest pain.   Gastrointestinal: Positive for abdominal distention.   Genitourinary: Positive for scrotal swelling.   Neurological: Negative.    Psychiatric/Behavioral: Negative.        Objective:     Vital Signs (Most Recent):  Temp: 98 °F (36.7 °C) (08/16/22 0404)  Pulse: 92 (08/16/22 0404)  Resp: 18 (08/16/22 0404)  BP: 105/69 (08/16/22 0404)  SpO2: 96 % (08/16/22 0404) Vital Signs (24h Range):  Temp:  [96.7 °F (35.9 °C)-98.1 °F (36.7 °C)] 98 °F (36.7 °C)  Pulse:  [84-99] 92  Resp:  [18-20] 18  SpO2:  [91 %-96 %] 96 %  BP: ()/(62-84) 105/69     Weight: 76.5 kg (168 lb 10.4 oz)  Body mass index is 23.52 kg/m².    SpO2: 96 %  O2 Device (Oxygen Therapy): nasal cannula      Intake/Output Summary (Last 24 hours) at 8/16/2022 0627  Last data filed at 8/16/2022 0404  Gross per 24 hour   Intake 360 ml   Output 4650 ml   Net -4290 ml       Lines/Drains/Airways     Peripheral Intravenous Line  Duration                Peripheral IV - Single Lumen 08/13/22 1232 18 G Left Antecubital 2 days                Significant Labs:  Recent Results (from the past 72 hour(s))   POCT ARTERIAL BLOOD GAS Blood Gas    Collection Time: 08/13/22 12:47 PM   Result Value Ref Range    POC PH 7.302     POC PCO2 59.6     POC PO2 94     POC Sodium      POC Potassium      POC Ionized Calcium      POC HEMOGLOBIN      POC O2Hb      POC COHb      POC MetHb      POC PCO2      Base Excess, Arterial 3.0 (A) -2.0 - 2.0 mmol/L    O2 Sat, Art 96     HCO3, Arterial 29.4 (A) 18.0 - 23.0 MMOL/L   ISTAT PROCEDURE    Collection Time: 08/13/22 12:47 PM   Result Value Ref Range    POC PH 7.302 (L) 7.35 - 7.45    POC PCO2 59.6 (HH) 35 - 45 mmHg    POC PO2 94 80 - 100 mmHg    POC HCO3 29.4 (H) 24 - 28  mmol/L    POC BE 3 -2 to 2 mmol/L    POC SATURATED O2 96 95 - 100 %    POC TCO2 31 (H) 23 - 27 mmol/L    Sample ARTERIAL    COVID/FLU A&B PCR    Collection Time: 08/13/22 12:54 PM   Result Value Ref Range    Influenza A PCR Not Detected Not Detected    Influenza B PCR Not Detected Not Detected    SARS-CoV-2 PCR Not Detected Not Detected   Urinalysis, Reflex to Urine Culture Urine, Clean Catch    Collection Time: 08/13/22 12:54 PM    Specimen: Urine, Clean Catch   Result Value Ref Range    Color, UA Yellow Yellow, Colorless, Other, Clear    Appearance, UA SL CLOUDY (A) Clear    Specific Gravity, UA 1.025     pH, UA 5.5 5.0, 5.5, 6.0, 6.5, 7.0, 7.5, 8.0, 8.5    Protein,   (A) Negative, 300  mg/dL    Glucose, UA Negative Negative, Normal mg/dL    Ketones, UA Negative Negative, +1, +2, +3, +4, +5, >=160, >=80 mg/dL    Blood, UA Moderate (A) Negative unit/L    Bilirubin, UA Negative Negative mg/dL    Urobilinogen, UA 0.2 0.2, 1.0, Normal mg/dL    Nitrites, UA Negative Negative    Leukocyte Esterase, UA Negative Negative, 75  unit/L   Urinalysis, Microscopic    Collection Time: 08/13/22 12:54 PM   Result Value Ref Range    Bacteria, UA Few (A) None Seen, Rare, Occasional /HPF    Hyaline Casts, UA Few (A) None Seen /HPF    Sperm, UA Few (A) None Seen /HPF    RBC, UA 3-5 None Seen, 0-2, 3-5, 0-5 /HPF    WBC, UA 11-20 (A) None Seen, 0-2, 3-5, 0-5 /HPF    Squamous Epithelial Cells, UA Rare None Seen, Rare, Occasional, Occ /HPF   Urine culture    Collection Time: 08/13/22 12:54 PM    Specimen: Urine, Clean Catch   Result Value Ref Range    Urine Culture No Growth    BNP    Collection Time: 08/13/22 12:55 PM   Result Value Ref Range    Natriuretic Peptide 3,280.3 (H) <=100.0 pg/mL   CBC with Differential    Collection Time: 08/13/22 12:55 PM   Result Value Ref Range    WBC 14.7 (H) 4.5 - 11.5 x10(3)/mcL    RBC 5.69 4.70 - 6.10 x10(6)/mcL    Hgb 17.6 14.0 - 18.0 gm/dL    Hct 54.9 (H) 42.0 - 52.0 %    MCV 96.5 (H) 80.0 -  94.0 fL    MCH 30.9 27.0 - 31.0 pg    MCHC 32.1 (L) 33.0 - 36.0 mg/dL    RDW 13.7 11.5 - 17.0 %    Platelet 195 130 - 400 x10(3)/mcL    MPV 11.2 (H) 7.4 - 10.4 fL    Neut % 84.4 %    Lymph % 10.7 %    Mono % 4.4 %    Eos % 0.0 %    Basophil % 0.1 %    Lymph # 1.58 0.6 - 4.6 x10(3)/mcL    Neut # 12.4 (H) 2.1 - 9.2 x10(3)/mcL    Mono # 0.64 0.1 - 1.3 x10(3)/mcL    Eos # 0.00 0 - 0.9 x10(3)/mcL    Baso # 0.02 0 - 0.2 x10(3)/mcL    IG# 0.06 (H) 0 - 0.04 x10(3)/mcL    IG% 0.4 %    NRBC% 0.1 %   Troponin I    Collection Time: 08/13/22 12:55 PM   Result Value Ref Range    Troponin-I 0.134 (H) 0.000 - 0.045 ng/mL   APTT    Collection Time: 08/13/22 12:55 PM   Result Value Ref Range    PTT 31.9 23.4 - 33.9 seconds   Lipase    Collection Time: 08/13/22 12:55 PM   Result Value Ref Range    Lipase Level 17 <=60 U/L   Magnesium    Collection Time: 08/13/22 12:55 PM   Result Value Ref Range    Magnesium Level 1.80 1.60 - 2.60 mg/dL   Lactic Acid, Plasma    Collection Time: 08/13/22 12:55 PM   Result Value Ref Range    Lactic Acid Level 3.6 (HH) 0.5 - 2.2 mmol/L   Comprehensive Metabolic Panel    Collection Time: 08/13/22 12:55 PM   Result Value Ref Range    Sodium Level 140 136 - 145 mmol/L    Potassium Level 6.1 (H) 3.5 - 5.1 mmol/L    Chloride 98 98 - 107 mmol/L    Carbon Dioxide 28 22 - 29 mmol/L    Glucose Level 136 (H) 74 - 100 mg/dL    Blood Urea Nitrogen 45.0 (H) 8.4 - 25.7 mg/dL    Creatinine 1.36 (H) 0.73 - 1.18 mg/dL    Calcium Level Total 8.9 8.4 - 10.2 mg/dL    Protein Total 6.7 6.4 - 8.3 gm/dL    Albumin Level 2.9 (L) 3.5 - 5.0 gm/dL    Globulin 3.8 (H) 2.4 - 3.5 gm/dL    Albumin/Globulin Ratio 0.8 (L) 1.1 - 2.0 ratio    Bilirubin Total 1.2 <=1.5 mg/dL    Alkaline Phosphatase 85 40 - 150 unit/L    Alanine Aminotransferase 23 0 - 55 unit/L    Aspartate Aminotransferase 37 (H) 5 - 34 unit/L    eGFR 60 mls/min/1.73/m2   D-Dimer, Quantitative    Collection Time: 08/13/22 12:55 PM   Result Value Ref Range    D-Dimer 3.05  (H) 0.00 - 0.50 ug/mL FEU   Ethanol    Collection Time: 08/13/22 12:55 PM   Result Value Ref Range    Ethanol Level <10.0 <=10.0 mg/dL   TSH    Collection Time: 08/13/22 12:55 PM   Result Value Ref Range    Thyroid Stimulating Hormone 4.4097 0.3500 - 4.9400 uIU/mL   CK    Collection Time: 08/13/22 12:55 PM   Result Value Ref Range    Creatine Kinase 85 30 - 200 U/L   Ammonia    Collection Time: 08/13/22  1:00 PM   Result Value Ref Range    Ammonia Level 26.9 18.0 - 72.0 umol/L   Blood Culture    Collection Time: 08/13/22  1:14 PM    Specimen: Arm, Left; Blood   Result Value Ref Range    CULTURE, BLOOD ([facility]) No Growth At 48 Hours    Blood Culture    Collection Time: 08/13/22  1:14 PM    Specimen: Hand, Left; Blood   Result Value Ref Range    CULTURE, BLOOD ([facility]) No Growth At 48 Hours    Drug Screen, Urine    Collection Time: 08/13/22  1:35 PM   Result Value Ref Range    Amphetamines, Urine Negative Negative    Barbituates, Urine Negative Negative    Benzodiazepine, Urine Negative Negative    Cannabinoids, Urine Negative Negative    Cocaine, Urine Negative Negative    MDMA, Urine Negative Negative    Opiates, Urine Negative Negative    Phencyclidine, Urine Negative Negative    pH, Urine 5.5 3.0 - 11.0    Specific Gravity, Urine Auto 1.025 1.001 - 1.035   BNP    Collection Time: 08/14/22  6:58 AM   Result Value Ref Range    Natriuretic Peptide 3,691.5 (H) <=100.0 pg/mL   CBC Without Differential    Collection Time: 08/14/22  6:58 AM   Result Value Ref Range    WBC 10.7 4.5 - 11.5 x10(3)/mcL    RBC 4.97 4.70 - 6.10 x10(6)/mcL    Hgb 15.3 14.0 - 18.0 gm/dL    Hct 51.3 42.0 - 52.0 %    Platelet 153 130 - 400 x10(3)/mcL    .2 (H) 80.0 - 94.0 fL    MCH 30.8 27.0 - 31.0 pg    MCHC 29.8 (L) 33.0 - 36.0 mg/dL    RDW 13.6 11.5 - 17.0 %    MPV 10.5 (H) 7.4 - 10.4 fL    NRBC% 0.0 %   CK-MB    Collection Time: 08/14/22  6:58 AM   Result Value Ref Range    Creatine Kinase MB 4.8 <=7.2 ng/mL   Comprehensive Metabolic Panel     Collection Time: 08/14/22  6:58 AM   Result Value Ref Range    Sodium Level 139 136 - 145 mmol/L    Potassium Level 5.2 (H) 3.5 - 5.1 mmol/L    Chloride 98 98 - 107 mmol/L    Carbon Dioxide 33 (H) 22 - 29 mmol/L    Glucose Level 96 74 - 100 mg/dL    Blood Urea Nitrogen 43.2 (H) 8.4 - 25.7 mg/dL    Creatinine 1.09 0.73 - 1.18 mg/dL    Calcium Level Total 8.6 8.4 - 10.2 mg/dL    Protein Total 5.4 (L) 6.4 - 8.3 gm/dL    Albumin Level 2.6 (L) 3.5 - 5.0 gm/dL    Globulin 2.8 2.4 - 3.5 gm/dL    Albumin/Globulin Ratio 0.9 (L) 1.1 - 2.0 ratio    Bilirubin Total 0.7 <=1.5 mg/dL    Alkaline Phosphatase 69 40 - 150 unit/L    Alanine Aminotransferase 21 0 - 55 unit/L    Aspartate Aminotransferase 25 5 - 34 unit/L    eGFR >60 mls/min/1.73/m2   Lactic Acid, Plasma    Collection Time: 08/14/22  6:58 AM   Result Value Ref Range    Lactic Acid Level 1.6 0.5 - 2.2 mmol/L   Magnesium    Collection Time: 08/14/22  6:58 AM   Result Value Ref Range    Magnesium Level 1.50 (L) 1.60 - 2.60 mg/dL   Phosphorus    Collection Time: 08/14/22  6:58 AM   Result Value Ref Range    Phosphorus Level 5.7 (H) 2.3 - 4.7 mg/dL   Troponin I    Collection Time: 08/14/22  1:16 PM   Result Value Ref Range    Troponin-I 0.097 (H) 0.000 - 0.045 ng/mL   Echo    Collection Time: 08/14/22  3:20 PM   Result Value Ref Range    BSA 1.75 m2    TDI SEPTAL 0.05 m/s    LV LATERAL E/E' RATIO 16.80 m/s    LV SEPTAL E/E' RATIO 16.80 m/s    Right Atrial Pressure (from IVC) 8 mmHg    EF 15 %    Left Ventricular Outflow Tract Mean Velocity 0.347 cm/s    Left Ventricular Outflow Tract Mean Gradient 1.00 mmHg    TDI LATERAL 0.05 m/s    LVIDd 5.60 3.5 - 6.0 cm    IVS 1.11 (A) 0.6 - 1.1 cm    Posterior Wall 1.05 0.6 - 1.1 cm    LVIDs 4.79 (A) 2.1 - 4.0 cm    FS 14 28 - 44 %    LV mass 243.27 g    LA size 4.00 cm    TAPSE 0.97 cm    Left Ventricle Relative Wall Thickness 0.38 cm    AV mean gradient 3 mmHg    AV valve area 1.24 cm2    AV Velocity Ratio 0.46     AV index  (prosthetic) 0.39     MV mean gradient 1 mmHg    MV valve area p 1/2 method 3.33 cm2    MV valve area by continuity eq 1.12 cm2    E/A ratio 3.11     Mean e' 0.05 m/s    E wave deceleration time 190 msec    LVOT diameter 2.00 cm    LVOT area 3.1 cm2    LVOT peak destin 0.57 m/s    LVOT peak VTI 7.30 cm    Ao peak destin 1.23 m/s    Ao VTI 18.5 cm    LVOT stroke volume 22.92 cm3    AV peak gradient 6 mmHg    MV peak gradient 3 mmHg    TV rest pulmonary artery pressure 50 mmHg    E/E' ratio 16.80 m/s    MV Peak E Destin 0.84 m/s    TR Max Destin 3.23 m/s    MV VTI 20.4 cm    MV stenosis pressure 1/2 time 66 ms    MV Peak A Destin 0.27 m/s    LV Systolic Volume 107.00 mL    LV Systolic Volume Index 59.8 mL/m2    LV Diastolic Volume 154.00 mL    LV Diastolic Volume Index 86.03 mL/m2    LV Mass Index 136 g/m2    Triscuspid Valve Regurgitation Peak Gradient 42 mmHg   Troponin I    Collection Time: 08/14/22  6:36 PM   Result Value Ref Range    Troponin-I 0.120 (H) 0.000 - 0.045 ng/mL   Troponin I    Collection Time: 08/15/22  2:50 AM   Result Value Ref Range    Troponin-I 0.105 (H) 0.000 - 0.045 ng/mL   CBC with Differential    Collection Time: 08/15/22  3:29 AM   Result Value Ref Range    WBC 8.0 4.5 - 11.5 x10(3)/mcL    RBC 4.39 (L) 4.70 - 6.10 x10(6)/mcL    Hgb 13.5 (L) 14.0 - 18.0 gm/dL    Hct 44.2 42.0 - 52.0 %    .7 (H) 80.0 - 94.0 fL    MCH 30.8 27.0 - 31.0 pg    MCHC 30.5 (L) 33.0 - 36.0 mg/dL    RDW 13.4 11.5 - 17.0 %    Platelet 127 (L) 130 - 400 x10(3)/mcL    MPV 10.0 7.4 - 10.4 fL    Neut % 73.0 %    Lymph % 17.4 %    Mono % 7.9 %    Eos % 1.1 %    Basophil % 0.3 %    Lymph # 1.39 0.6 - 4.6 x10(3)/mcL    Neut # 5.8 2.1 - 9.2 x10(3)/mcL    Mono # 0.63 0.1 - 1.3 x10(3)/mcL    Eos # 0.09 0 - 0.9 x10(3)/mcL    Baso # 0.02 0 - 0.2 x10(3)/mcL    IG# 0.02 0 - 0.04 x10(3)/mcL    IG% 0.3 %    NRBC% 0.0 %   Comprehensive Metabolic Panel    Collection Time: 08/15/22  3:30 AM   Result Value Ref Range    Sodium Level 141 136 - 145  mmol/L    Potassium Level 3.5 3.5 - 5.1 mmol/L    Chloride 94 (L) 98 - 107 mmol/L    Carbon Dioxide 37 (H) 22 - 29 mmol/L    Glucose Level 99 74 - 100 mg/dL    Blood Urea Nitrogen 38.1 (H) 8.4 - 25.7 mg/dL    Creatinine 0.86 0.73 - 1.18 mg/dL    Calcium Level Total 8.0 (L) 8.4 - 10.2 mg/dL    Protein Total 5.5 (L) 6.4 - 8.3 gm/dL    Albumin Level 2.6 (L) 3.5 - 5.0 gm/dL    Globulin 2.9 2.4 - 3.5 gm/dL    Albumin/Globulin Ratio 0.9 (L) 1.1 - 2.0 ratio    Bilirubin Total 0.5 <=1.5 mg/dL    Alkaline Phosphatase 86 40 - 150 unit/L    Alanine Aminotransferase 23 0 - 55 unit/L    Aspartate Aminotransferase 25 5 - 34 unit/L    eGFR >60 mls/min/1.73/m2   Phosphorus    Collection Time: 08/15/22  3:30 AM   Result Value Ref Range    Phosphorus Level 4.2 2.3 - 4.7 mg/dL   Magnesium    Collection Time: 08/15/22  3:30 AM   Result Value Ref Range    Magnesium Level 1.50 (L) 1.60 - 2.60 mg/dL   Lipid Panel    Collection Time: 08/16/22  3:53 AM   Result Value Ref Range    Cholesterol Total 104 <=200 mg/dL    HDL Cholesterol 33 (L) 35 - 60 mg/dL    Triglyceride 71 34 - 140 mg/dL    Cholesterol/HDL Ratio 3 0 - 5    Very Low Density Lipoprotein 14     LDL Cholesterol 57.00 50.00 - 140.00 mg/dL   Basic Metabolic Panel    Collection Time: 08/16/22  3:53 AM   Result Value Ref Range    Sodium Level 140 136 - 145 mmol/L    Potassium Level 3.2 (L) 3.5 - 5.1 mmol/L    Chloride 88 (L) 98 - 107 mmol/L    Carbon Dioxide 41 (HH) 22 - 29 mmol/L    Glucose Level 70 (L) 74 - 100 mg/dL    Blood Urea Nitrogen 26.2 (H) 8.4 - 25.7 mg/dL    Creatinine 0.70 (L) 0.73 - 1.18 mg/dL    BUN/Creatinine Ratio 37     Calcium Level Total 7.8 (L) 8.4 - 10.2 mg/dL    Anion Gap 11.0 mEq/L    eGFR >60 mls/min/1.73/m2   Magnesium    Collection Time: 08/16/22  3:53 AM   Result Value Ref Range    Magnesium Level 1.50 (L) 1.60 - 2.60 mg/dL   CBC with Differential    Collection Time: 08/16/22  3:53 AM   Result Value Ref Range    WBC 7.4 4.5 - 11.5 x10(3)/mcL    RBC 4.17  (L) 4.70 - 6.10 x10(6)/mcL    Hgb 13.0 (L) 14.0 - 18.0 gm/dL    Hct 40.5 (L) 42.0 - 52.0 %    MCV 97.1 (H) 80.0 - 94.0 fL    MCH 31.2 (H) 27.0 - 31.0 pg    MCHC 32.1 (L) 33.0 - 36.0 mg/dL    RDW 13.3 11.5 - 17.0 %    Platelet 172 130 - 400 x10(3)/mcL    MPV 11.9 (H) 7.4 - 10.4 fL    Neut % 70.4 %    Lymph % 18.4 %    Mono % 8.8 %    Eos % 1.6 %    Basophil % 0.4 %    Lymph # 1.36 0.6 - 4.6 x10(3)/mcL    Neut # 5.2 2.1 - 9.2 x10(3)/mcL    Mono # 0.65 0.1 - 1.3 x10(3)/mcL    Eos # 0.12 0 - 0.9 x10(3)/mcL    Baso # 0.03 0 - 0.2 x10(3)/mcL    IG# 0.03 0 - 0.04 x10(3)/mcL    IG% 0.4 %    NRBC% 0.0 %       Significant Imaging:  Imaging Results          CTA Chest Abdomen Non Coronary (Final result)  Result time 08/13/22 15:55:52   Procedure changed from CTA Chest Non-Coronary (PE Study)     Final result by Basilio De Oliveira MD (08/13/22 15:55:52)                 Impression:      Left lower lobe pulmonary thromboembolism is noted.    Large right pleural effusion is present.    Findings of anasarca.    The bladder wall is prominent.  Correlate with urinalysis.    There are cortical defects of the bilateral kidneys likely related to chronic renal disease and scarring, however infarcts are less likely.    The liver appears heterogeneous which may be related to phase of contrast however is cirrhosis is not excluded.  Cardiomegaly is present.  Further evaluation may be obtained with ECHO.    Findings reported to Dr. Little prior to interpretation.      Electronically signed by: Basilio De Oliveira  Date:    08/13/2022  Time:    15:55             Narrative:    EXAMINATION:  CTA CHEST ABDOMEN NON CORONARY (XPD)    CLINICAL HISTORY:  Pulmonary embolism (PE) suspected, positive D-dimer;    TECHNIQUE:  Axial CTA images of the chest, abdomen, and pelvis were obtained With Contrast. Sagittal and coronal reconstructed images were available for review.    Automatic exposure control was utilized to reduce the patient's radiation dose.    DLP =  582    COMPARISON:  No prior images available for comparison.    FINDINGS:  PULMONARY ARTERY: Thrombus is identified in the left lower lobe pulmonary artery.    AORTA: The thoracoabdominal aorta is normal in course and caliber. Scattered atherosclerotic disease is noted.    HEART: The heart is enlarged.  No pericardial effusion.    THYROID GLAND: The thyroid is not enlarged. There are no nodules identified.    AIRWAYS: Trachea is midline and tracheobronchial tree is patent.    LUNGS: Large right effusion with subsegmental atelectatic changes at the right base.  Emphysematous changes throughout the lungs.    THROACIC LYMPH NODES: There is no significant mediastinal, axillary or hilar lymphadenopathy.    HEPATOBILIARY: Somewhat nodular contour of the superior aspect of the liver with some heterogeneity may be related to phase of contrast versus cirrhosis.  Correlate with patient's history.  The gallbladder is normal.    SPLEEN: Normal    PANCREAS: No focal masses or ductal dilatation.    ADRENALS: No adrenal nodules.    KIDNEYS: No evidence of hydronephrosis.  Bilateral renal cortical perfusional defects likely related to scarring in chronic kidney disease.  Correlate with patient's history.  Less likely infarcts.    ABDOMINAL LYMPHADENOPATHY/RETROPERITONEUM: There is no retroperitoneal lymphadenopathy.    BOWEL: No acute bowel related abnormalities.    PELVIC VISCERA: The bladder wall is somewhat prominent.  Correlate with urinalysis.    PELVIC LYMPH NODES: No lymphadenopathy.    PERITONEUM/ BODY WALL: Diffuse body wall edema with moderate ascites noted.    SKELETAL: No aggressive appearing lytic/blastic lesion. No acute fractures, subluxations or dislocations.                               X-Ray Chest 1 View (Final result)  Result time 08/13/22 12:44:56    Final result by Basilio De Oliveira MD (08/13/22 12:44:56)                 Impression:      Right greater than left pleural effusion with possible right lower lobe  opacification.  Underlying infectious process is not excluded.  Recommend continued follow-up.      Electronically signed by: Basilio De Oliveira  Date:    08/13/2022  Time:    12:44             Narrative:    EXAMINATION:  XR CHEST 1 VIEW    CLINICAL HISTORY:  shortness of breath;    TECHNIQUE:  Single view of the chest    COMPARISON:  No prior imaging available for comparison.    FINDINGS:  Right greater than left pleural effusion with possible right lower lobe opacification.  Underlying infectious process is not excluded.  Recommend continued follow-up.                                EKG:      Telemetry:        Physical Exam  HENT:      Mouth/Throat:      Mouth: Mucous membranes are moist.      Comments: Poor dentition  Cardiovascular:      Rate and Rhythm: Normal rate and regular rhythm.      Heart sounds: Normal heart sounds.      Comments: anasarca  Pulmonary:      Breath sounds: Rales present.      Comments: Conversational dyspnea  Abdominal:      Palpations: Abdomen is soft.   Musculoskeletal:      Right lower leg: Edema present.      Left lower leg: Edema present.   Skin:     General: Skin is warm.   Neurological:      General: No focal deficit present.      Mental Status: He is alert.   Psychiatric:         Mood and Affect: Mood normal.         Home Medications:   No current facility-administered medications on file prior to encounter.     Current Outpatient Medications on File Prior to Encounter   Medication Sig Dispense Refill    albuterol sulfate (INV ALBUTEROL) 90 mcg inhalation Inhale into the lungs as needed. Take one puff by mouth as directed by Physician.         Current Inpatient Medications:    Current Facility-Administered Medications:     acetaminophen tablet 650 mg, 650 mg, Oral, Q4H PRN, Reginald Barker MD, 650 mg at 08/16/22 0459    albuterol-ipratropium 2.5 mg-0.5 mg/3 mL nebulizer solution 3 mL, 3 mL, Nebulization, Q4H PRN, Reginald Barker MD    aspirin chewable tablet 81 mg, 81 mg, Oral, Daily,  Jaylin Grissom, FNP    atorvastatin tablet 40 mg, 40 mg, Oral, QHS, Kwasi Thao MD, 40 mg at 08/15/22 2042    enoxaparin injection 80 mg, 80 mg, Subcutaneous, Q12H, Kwasi Thao MD, 80 mg at 08/16/22 0459    famotidine tablet 20 mg, 20 mg, Oral, BID, Reginald Barker MD, 20 mg at 08/15/22 2042    furosemide injection 20 mg, 20 mg, Intravenous, Q6H, Reginald Barker MD, 20 mg at 08/16/22 0027    magnesium oxide tablet 400 mg, 400 mg, Oral, BID, Kwasi Thao MD, 400 mg at 08/15/22 2042    melatonin tablet 6 mg, 6 mg, Oral, Nightly PRN, Reginald Barker MD    promethazine tablet 25 mg, 25 mg, Oral, Q6H PRN, Reginald Barker MD    sodium chloride 0.9% flush 10 mL, 10 mL, Intravenous, PRN, Yuni Little MD    sodium chloride 0.9% flush 10 mL, 10 mL, Intravenous, PRN, Reginald Barker MD         VTE Risk Mitigation (From admission, onward)         Ordered     enoxaparin injection 80 mg  Every 12 hours (non-standard times)         08/15/22 1314     IP VTE HIGH RISK PATIENT  Once         08/13/22 1523     Place sequential compression device  Until discontinued         08/13/22 1523     Place GIACOMO hose  Until discontinued         08/13/22 1523                Assessment:   CMO, unspecified  --EF 15%  Acute combined systolic and diastolic HF  --EF 15%; Grade III LVDD  --BNP 3280  --Anasarca  NSTEMI, Type II s/t heart failure  --Troponin 0.134->0.120->0.105  LLL PE  Left BBB  ANNETTA  --improving with diuresis  COPD  Former smoker  No hx GIB    Plan:   Excellent UOP. Still overloaded. Continue diuresis. Ensure accurate I&O's/daily weights  Will start GDMT once patient is closer to euvolemia  Troponin elevation with flat trend likely s/t CMO. Will plan for coronary evaluation once euvolemic pending stable renal function. Decrease aspirin to 81 mg daily. Continue weight based lovenox bid.  Lifevest will be needed prior to DC- will discuss with lifevest rep financial assistance for lifevest due to self pay status.  No  indication for mechanical thrombectomy as patient is hemodynamically stable. Continue weight based Lovenox with plans to transition to DOAC prior to DC.       Thank you for your consult.     ART De Oliveira  Cardiology  [facility] - 3rd Floor Medical Telemetry  08/16/2022

## 2022-08-16 NOTE — PT/OT/SLP PROGRESS
Occupational Therapy  Treatment    Harpreet Rodas   MRN: [id number]   Admitting Diagnosis: Anasarca    OT Date of Treatment: 08/16/22   OT Start Time: 1447  OT Stop Time: 1510  OT Total Time (min): 23 min     Billable Minutes:  Self Care/Home Management 23  Total Minutes: 23     OT/ALY: ALY     ALY Visit Number: 1    General Precautions: Standard, fall  Orthopedic Precautions:    Braces:      Spiritual, Cultural Beliefs, [employer] Practices, Values that Affect Care: no    Subjective:  Communicated with RN prior to session.         Objective:       Functional Mobility:  Bed Mobility:   Supine to sit: Standby Assistance   Sit to supine: Standby Assistance    LE Dressing:  Patient don/doffed socks with Minimal Assistance and using AE to doff/dat B socks    Patient left HOB elevated with all lines intact and call button in reach    ASSESSMENT:  Harpreet Rodas is a 56 y.o. male with a medical diagnosis of Anasarca.    Rehab potential is excellent    Activity tolerance: Excellent    Discharge recommendations: rehabilitation facility     Equipment recommendations:       GOALS:   Multidisciplinary Problems     Occupational Therapy Goals        Problem: Occupational Therapy    Goal Priority Disciplines Outcome Interventions   Occupational Therapy Goal     OT, PT/OT Ongoing, Progressing    Description: Goals to be met by: 8/29/22    Patient will increase functional independence with ADLs by performing:    LE Dressing with Stand-by Assistance.  Grooming while standing at sink with Stand-by Assistance.  Toileting from toilet with Stand-by Assistance for hygiene and clothing management.                      Plan:  Patient to be seen 3 x/week, 4 x/week, 5 x/week to address the above listed problems via self-care/home management, therapeutic activities, therapeutic exercises  Plan of Care expires: 08/29/22  Plan of Care reviewed with: patient         08/16/2022

## 2022-08-16 NOTE — PROGRESS NOTES
[facility] - 3rd Floor Medical Telemetry  Pulmonary Critical Care Note    Patient Name: Harpreet Rodas  MRN: [id number]  Admission Date: 8/13/2022  Hospital Length of Stay: 3 days  Code Status: Full Code  Attending Provider: Ana Rosa Fry MD  Primary Care Provider: Primary Doctor No     Subjective:     HPI:   This is a 56-year-old male who was admitted to [facility] on 08/13/2022 for complaints of worsening shortness of breath which has been ongoing over the last several months left-sided chest pain, and significant swelling of his right arm and bilateral feet.  Patient has history of drinking and smoking quit approximately 2 months ago prior to that was drinking 4-5 40 oz beer a weekly.  Workup revealed significantly elevated BNP of 3691.5, troponin 0.134, and CT imaging of the chest with a left lower lobe pulmonary thrombus, large right pleural effusion, and emphysematous changes throughout the lungs.  Pulmonary is being consulted for consideration of thoracentesis.  Patient was initiated on Lovenox b.i.d. secondary to pulmonary emboli. TTE obtained.     Further conversation revealed patient had been experiencing difficulty breathing approximately 6 months ago after inhalation of a combination of (murratic acid, sulfuric acid, bleach) while at work as a . Has been utilizing albuterol inhaler x2-3/day. Tried Trilegy x14 days without much relief. Approximately 3 months ago he began experiencing swelling in his B/L lower extremities in addition to early satiety.  He denied any associated weight loss stating he has always been a very thin person or fever/night sweats.  Denies any prior blood clots in legs/arm/lungs or any known cancers.  Mother and brother both passed away from unspecified cancer.      Patient has been exposed to a variety of pulmonary irritants/carcinogenic agents as listed below;  - Tobacco Use (approximately x70 pack years; quit x3 months ago)  - Asbestos  (Approximately 10-15 years)  - Sandblasting  - Glenn dust  - Harsh chemicals (Murratic Acid, Sulfuric Acid, Bleach; without appropriate respirator use)    24 Hour Interval History:  Per nurse reports; NIYA. He continues on IV lasix, FD Lovenox, and Levaquin at this time. Patient continues to utilize 2L NC saturating 95% at rest. He reports feeling slightly better from a respiratory stand point this morning and reduced left sided chest discomfort with deep inspiration. He ambulated to the restroom without O2 overnight in which O2 sats fell to 70s. It took him a few minutes to recover thereafter though during event he denied any LH/Dizziness, chest pain, palpitations, or LOC. We discussed possibly pursuing thoracentesis tomorrow after holding Lovenox x24 hours & visualization of fluid with Ultrasound; patient is agreeable in plan.    Past Medical History:   Diagnosis Date    COPD (chronic obstructive pulmonary disease)        Past Surgical History:   Procedure Laterality Date    MANDIBLE FRACTURE SURGERY         Social History     Socioeconomic History    Marital status:    Tobacco Use    Smoking status: Former Smoker     Packs/day: 2.00     Quit date: 2022     Years since quittin.1    Smokeless tobacco: Never Used   Substance and Sexual Activity    Alcohol use: Not Currently     Comment: quit 2 months ago    Drug use: Never       Current Outpatient Medications   Medication Instructions    albuterol sulfate (INV ALBUTEROL) 90 mcg inhalation Inhalation, As needed (PRN), Take one puff by mouth as directed by Physician.       Current Inpatient Medications   aspirin  81 mg Oral Daily    atorvastatin  40 mg Oral QHS    enoxaparin  80 mg Subcutaneous Q12H    famotidine  20 mg Oral BID    furosemide (LASIX) injection  20 mg Intravenous Q6H    magnesium oxide  400 mg Oral TID    magnesium sulfate IVPB  2 g Intravenous Q2H       Review of Systems   10 point ROS performed and negative other  than stated above.      Objective:       Intake/Output Summary (Last 24 hours) at 8/16/2022 1005  Last data filed at 8/16/2022 0404  Gross per 24 hour   Intake 360 ml   Output 4650 ml   Net -4290 ml         Vital Signs (Most Recent):  Temp: 98 °F (36.7 °C) (08/16/22 0730)  Pulse: 100 (08/16/22 0730)  Resp: 18 (08/16/22 0730)  BP: 119/75 (08/16/22 0730)  SpO2: 96 % (08/16/22 0730)  Body mass index is 23.52 kg/m².  Weight: 76.5 kg (168 lb 10.4 oz) Vital Signs (24h Range):  Temp:  [97.5 °F (36.4 °C)-98.1 °F (36.7 °C)] 98 °F (36.7 °C)  Pulse:  [] 100  Resp:  [18-20] 18  SpO2:  [91 %-96 %] 96 %  BP: ()/(62-84) 119/75     Physical Exam  Constitutional:       General: He is not in acute distress.     Appearance: He is ill-appearing. He is not toxic-appearing.      Comments: Pleasant, cachetic gentleman   HENT:      Head: Normocephalic and atraumatic.      Mouth/Throat:      Mouth: Mucous membranes are moist.      Pharynx: Oropharynx is clear. No oropharyngeal exudate or posterior oropharyngeal erythema.   Eyes:      General: No scleral icterus.  Cardiovascular:      Rate and Rhythm: Normal rate and regular rhythm.      Heart sounds: Normal heart sounds.   Pulmonary:      Effort: Pulmonary effort is normal.      Breath sounds: Wheezing present.      Comments: Diminished breath sounds throughout right lung fields on anterior auscultation  Musculoskeletal:      Right lower leg: Edema present.      Left lower leg: Edema present.      Comments: 3+ edema to B/L LEs w/ dependent edema to T4-T5 B/L; Right arm with significant edema & tender to palpation though slightly improved.   Neurological:      General: No focal deficit present.      Mental Status: He is alert and oriented to person, place, and time.   Psychiatric:         Mood and Affect: Mood normal.         Behavior: Behavior normal.       Lines/Drains/Airways     Peripheral Intravenous Line  Duration                Peripheral IV - Single Lumen 08/13/22 1232 18  G Left Antecubital 2 days                Significant Labs:    Lab Results   Component Value Date    WBC 7.4 08/16/2022    HGB 13.0 (L) 08/16/2022    HCT 40.5 (L) 08/16/2022    MCV 97.1 (H) 08/16/2022     08/16/2022   BNP of 3691.5, troponin 0.134      BMP  Lab Results   Component Value Date     08/16/2022    K 3.2 (L) 08/16/2022    CO2 41 (HH) 08/16/2022    BUN 26.2 (H) 08/16/2022    CREATININE 0.70 (L) 08/16/2022    CALCIUM 7.8 (L) 08/16/2022   Magnesium 1.5, phosphorus 5.7    ABG  Recent Labs   Lab 08/13/22  1247 08/16/22  0819   PH 7.302* 7.39   PO2 94 64*   PCO2 59.6* 90*   HCO3 29.4* 54.5   BE 3  --        Significant Imaging:  Echo  · The left ventricle is mildly enlarged with severely decreased systolic   function.  · The estimated ejection fraction is 15%.  · Grade III left ventricular diastolic dysfunction.  · Moderate right ventricular enlargement with moderately reduced right   ventricular systolic function.  · There is pulmonary hypertension. The estimated PA systolic pressure is   50 mmHg.  · Mild tricuspid regurgitation.  · Moderate right atrial enlargement.  · Mild left atrial enlargement.         Assessment/Plan:     Assessment  1. Left Lower Lobe Pulmonary Embolism  2. Large Right-sided pleural effusion w/ Atelectasis  3. HFrEF (EF of 15%, 8/2022) w/ elevated BNP  4. Compensated Respiratory Acidosis  5. RUE Edema  6. Hx of COPD (unquantified)  7. History of tobacco and alcohol abuse  8. Hx of Multiple Pulmonary Irritants as listed in above HPI  9. Cachectic w/ x3 months early satiety  10. Complaints of diarrhea on admit C diff pending - diarrhea resolved    Plan  - Continue O2 supplementation (currently 2L NC); wean as tolerated to maintain O2 sats >90%  - Though ABG revealed hypercarbia/hypoxia; would recommend not initiating BiPAP therapy at this time as patient is appropriately compensated w/ pH of (7.39) and in no apparent distress  - Will begin holding Lovenox starting PM of (8/16)  in pursuit of therapeutic/diagnostic thoracentesis planned for (8/17/22)  - Discussed above procedure plan w/ patient; is agreeable with plan  - Ordered HIV/Hepatitis Panel/Syphillis  - Initial B/L LE & RUE U/S NIVA negative, pending official read; would continue current AC  - TTE preliminary w/ EF of 15% w/ diastolic dysfunction; pending official read   -- Would continue diuresis as tolerated   -- Strict I's & O's (- 7.7L since admission)  - Further Recommendations per Pulmonologist    Polo Parnell MD   Internal Medicine PGY-II  Pulmonary Service    Attending Addendum to Follow

## 2022-08-17 ENCOUNTER — HOSPITAL ENCOUNTER (OUTPATIENT)
Dept: RADIOLOGY | Facility: HOSPITAL | Age: 57
Discharge: HOME | End: 2022-08-17

## 2022-08-17 LAB
ALBUMIN SERPL-MCNC: 2.5 GM/DL (ref 3.5–5)
ANION GAP SERPL CALC-SCNC: 10 MEQ/L
BUN SERPL-MCNC: 19 MG/DL (ref 8.4–25.7)
CALCIUM SERPL-MCNC: 7.7 MG/DL (ref 8.4–10.2)
CHLORIDE SERPL-SCNC: 86 MMOL/L (ref 98–107)
CO2 SERPL-SCNC: 43 MMOL/L (ref 22–29)
CREAT SERPL-MCNC: 0.71 MG/DL (ref 0.73–1.18)
CREAT/UREA NIT SERPL: 27
GFR SERPLBLD CREATININE-BSD FMLA CKD-EPI: >60 MLS/MIN/1.73/M2
GLUCOSE SERPL-MCNC: 98 MG/DL (ref 74–100)
LDH SERPL-CCNC: 267 U/L (ref 125–220)
LYMPHOCYTE MANUAL BF (OHS): 76 %
MAGNESIUM SERPL-MCNC: 1.9 MG/DL (ref 1.6–2.6)
MESOTHELIAL CELLS, FLUID MAN COUNT (OHS): 3
MONOCYTE MANUAL BF (OHS): 2 %
NEUTROPHILS MAN BF (OHS): 22 %
PHOSPHATE SERPL-MCNC: 1.7 MG/DL (ref 2.3–4.7)
POTASSIUM SERPL-SCNC: 3.8 MMOL/L (ref 3.5–5.1)
SODIUM SERPL-SCNC: 139 MMOL/L (ref 136–145)
WBC # FLD AUTO: 106 /UL

## 2022-08-17 PROCEDURE — 36415 COLL VENOUS BLD VENIPUNCTURE: CPT | Performed by: STUDENT IN AN ORGANIZED HEALTH CARE EDUCATION/TRAINING PROGRAM

## 2022-08-17 PROCEDURE — 84100 ASSAY OF PHOSPHORUS: CPT | Performed by: INTERNAL MEDICINE

## 2022-08-17 PROCEDURE — 63600175 PHARM REV CODE 636 W HCPCS: Performed by: INTERNAL MEDICINE

## 2022-08-17 PROCEDURE — 87102 FUNGUS ISOLATION CULTURE: CPT | Performed by: STUDENT IN AN ORGANIZED HEALTH CARE EDUCATION/TRAINING PROGRAM

## 2022-08-17 PROCEDURE — 27000221 HC OXYGEN, UP TO 24 HOURS

## 2022-08-17 PROCEDURE — 36415 COLL VENOUS BLD VENIPUNCTURE: CPT | Performed by: INTERNAL MEDICINE

## 2022-08-17 PROCEDURE — 94761 N-INVAS EAR/PLS OXIMETRY MLT: CPT

## 2022-08-17 PROCEDURE — 71045 X-RAY EXAM CHEST 1 VIEW: CPT | Mod: TC

## 2022-08-17 PROCEDURE — 25000003 PHARM REV CODE 250: Performed by: INTERNAL MEDICINE

## 2022-08-17 PROCEDURE — 97116 GAIT TRAINING THERAPY: CPT | Mod: CQ

## 2022-08-17 PROCEDURE — 83735 ASSAY OF MAGNESIUM: CPT | Performed by: INTERNAL MEDICINE

## 2022-08-17 PROCEDURE — 83615 LACTATE (LD) (LDH) ENZYME: CPT | Performed by: STUDENT IN AN ORGANIZED HEALTH CARE EDUCATION/TRAINING PROGRAM

## 2022-08-17 PROCEDURE — 84311 SPECTROPHOTOMETRY: CPT | Performed by: INTERNAL MEDICINE

## 2022-08-17 PROCEDURE — 89051 BODY FLUID CELL COUNT: CPT | Performed by: STUDENT IN AN ORGANIZED HEALTH CARE EDUCATION/TRAINING PROGRAM

## 2022-08-17 PROCEDURE — 21400001 HC TELEMETRY ROOM

## 2022-08-17 PROCEDURE — 25000003 PHARM REV CODE 250: Performed by: NURSE PRACTITIONER

## 2022-08-17 PROCEDURE — 87205 SMEAR GRAM STAIN: CPT | Performed by: STUDENT IN AN ORGANIZED HEALTH CARE EDUCATION/TRAINING PROGRAM

## 2022-08-17 PROCEDURE — 87070 CULTURE OTHR SPECIMN AEROBIC: CPT | Performed by: STUDENT IN AN ORGANIZED HEALTH CARE EDUCATION/TRAINING PROGRAM

## 2022-08-17 PROCEDURE — 80048 BASIC METABOLIC PNL TOTAL CA: CPT | Performed by: INTERNAL MEDICINE

## 2022-08-17 RX ORDER — SODIUM,POTASSIUM PHOSPHATES 280-250MG
1 POWDER IN PACKET (EA) ORAL
Status: COMPLETED | OUTPATIENT
Start: 2022-08-17 | End: 2022-08-19

## 2022-08-17 RX ORDER — FUROSEMIDE 10 MG/ML
20 INJECTION INTRAMUSCULAR; INTRAVENOUS
Status: DISCONTINUED | OUTPATIENT
Start: 2022-08-17 | End: 2022-08-24

## 2022-08-17 RX ORDER — ACETAZOLAMIDE 500 MG/5ML
500 INJECTION, POWDER, LYOPHILIZED, FOR SOLUTION INTRAVENOUS ONCE
Status: COMPLETED | OUTPATIENT
Start: 2022-08-17 | End: 2022-08-17

## 2022-08-17 RX ORDER — MAGNESIUM SULFATE HEPTAHYDRATE 40 MG/ML
2 INJECTION, SOLUTION INTRAVENOUS ONCE
Status: COMPLETED | OUTPATIENT
Start: 2022-08-17 | End: 2022-08-17

## 2022-08-17 RX ORDER — LIDOCAINE HYDROCHLORIDE 20 MG/ML
INJECTION, SOLUTION EPIDURAL; INFILTRATION; INTRACAUDAL; PERINEURAL
Status: DISPENSED
Start: 2022-08-17 | End: 2022-08-17

## 2022-08-17 RX ADMIN — FAMOTIDINE 20 MG: 20 TABLET, FILM COATED ORAL at 10:08

## 2022-08-17 RX ADMIN — Medication 400 MG: at 09:08

## 2022-08-17 RX ADMIN — Medication 1 PACKET: at 12:08

## 2022-08-17 RX ADMIN — FUROSEMIDE 20 MG: 10 INJECTION, SOLUTION INTRAMUSCULAR; INTRAVENOUS at 04:08

## 2022-08-17 RX ADMIN — Medication 1 PACKET: at 07:08

## 2022-08-17 RX ADMIN — POTASSIUM PHOSPHATE, MONOBASIC AND POTASSIUM PHOSPHATE, DIBASIC 20 MMOL: 224; 236 INJECTION, SOLUTION, CONCENTRATE INTRAVENOUS at 10:08

## 2022-08-17 RX ADMIN — FAMOTIDINE 20 MG: 20 TABLET, FILM COATED ORAL at 07:08

## 2022-08-17 RX ADMIN — MAGNESIUM SULFATE HEPTAHYDRATE 2 G: 40 INJECTION, SOLUTION INTRAVENOUS at 10:08

## 2022-08-17 RX ADMIN — Medication 1 PACKET: at 04:08

## 2022-08-17 RX ADMIN — ATORVASTATIN CALCIUM 40 MG: 40 TABLET, FILM COATED ORAL at 07:08

## 2022-08-17 RX ADMIN — ACETAZOLAMIDE SODIUM 500 MG: 500 INJECTION, POWDER, LYOPHILIZED, FOR SOLUTION INTRAVENOUS at 10:08

## 2022-08-17 RX ADMIN — ASPIRIN 81 MG CHEWABLE TABLET 81 MG: 81 TABLET CHEWABLE at 10:08

## 2022-08-17 RX ADMIN — ENOXAPARIN SODIUM 80 MG: 80 INJECTION SUBCUTANEOUS at 04:08

## 2022-08-17 RX ADMIN — Medication 400 MG: at 10:08

## 2022-08-17 RX ADMIN — FUROSEMIDE 20 MG: 10 INJECTION, SOLUTION INTRAMUSCULAR; INTRAVENOUS at 05:08

## 2022-08-17 RX ADMIN — Medication 400 MG: at 04:08

## 2022-08-17 NOTE — PROGRESS NOTES
[facility] - 3rd Floor Medical Telemetry  Pulmonary Critical Care Note    Patient Name: Harpreet Rodas  MRN: [id number]  Admission Date: 8/13/2022  Hospital Length of Stay: 4 days  Code Status: Full Code  Attending Provider: Ana Rosa Fry MD  Primary Care Provider: Primary Doctor No     Subjective:     HPI:   This is a 56-year-old male who was admitted to [facility] on 08/13/2022 for complaints of worsening shortness of breath which has been ongoing over the last several months left-sided chest pain, and significant swelling of his right arm and bilateral feet.  Patient has history of drinking and smoking quit approximately 2 months ago prior to that was drinking 4-5 40 oz beer a weekly.  Workup revealed significantly elevated BNP of 3691.5, troponin 0.134, and CT imaging of the chest with a left lower lobe pulmonary thrombus, large right pleural effusion, and emphysematous changes throughout the lungs.  Pulmonary is being consulted for consideration of thoracentesis.  Patient was initiated on Lovenox b.i.d. secondary to pulmonary emboli. TTE obtained.     Further conversation revealed patient had been experiencing difficulty breathing approximately 6 months ago after inhalation of a combination of (murratic acid, sulfuric acid, bleach) while at work as a . Has been utilizing albuterol inhaler x2-3/day. Tried Trilegy x14 days without much relief. Approximately 3 months ago he began experiencing swelling in his B/L lower extremities in addition to early satiety.  He denied any associated weight loss stating he has always been a very thin person or fever/night sweats.  Denies any prior blood clots in legs/arm/lungs or any known cancers.  Mother and brother both passed away from unspecified cancer.      Patient has been exposed to a variety of pulmonary irritants/carcinogenic agents as listed below;  - Tobacco Use (approximately x70 pack years; quit x3 months ago)  - Asbestos  (Approximately 10-15 years)  - Sandblasting  - Glenn dust  - Harsh chemicals (Murratic Acid, Sulfuric Acid, Bleach; without appropriate respirator use)    24 Hour Interval History:  Per nurse reports; NIYA. He continues on 2L NC, IV lasix, & Levaquin at this time. FD lovenox held yesterday evening for planned Thoracentesis this AM. Patient reports not sleeping well but continues to have decreased shortness of breath and adequate urine output. Only complaint at this time is of mild headache. Thoracentesis performed this AM (22) in which patient tolerated well and 1030cc straw colored fluid was removed. Pending post-procedural CXR. Sent sample to lab for further analysis as outlined below.    Past Medical History:   Diagnosis Date    COPD (chronic obstructive pulmonary disease)        Past Surgical History:   Procedure Laterality Date    MANDIBLE FRACTURE SURGERY         Social History     Socioeconomic History    Marital status:    Tobacco Use    Smoking status: Former Smoker     Packs/day: 2.00     Quit date: 2022     Years since quittin.1    Smokeless tobacco: Never Used   Substance and Sexual Activity    Alcohol use: Not Currently     Comment: quit 2 months ago    Drug use: Never       Current Outpatient Medications   Medication Instructions    albuterol sulfate (INV ALBUTEROL) 90 mcg inhalation Inhalation, As needed (PRN), Take one puff by mouth as directed by Physician.       Current Inpatient Medications   aspirin  81 mg Oral Daily    atorvastatin  40 mg Oral QHS    enoxaparin  80 mg Subcutaneous Q12H    famotidine  20 mg Oral BID    furosemide (LASIX) injection  20 mg Intravenous Q12H    LIDOcaine (PF) 20 mg/mL (2%)        magnesium oxide  400 mg Oral TID    magnesium sulfate IVPB  2 g Intravenous Once    potassium phosphate IVPB  20 mmol Intravenous Once    potassium, sodium phosphates  1 packet Oral QID (WM & HS)       ROS10 point ROS performed and negative other  than stated above.      Objective:       Intake/Output Summary (Last 24 hours) at 8/17/2022 1125  Last data filed at 8/17/2022 0700  Gross per 24 hour   Intake 1080 ml   Output 6175 ml   Net -5095 ml         Vital Signs (Most Recent):  Temp: 97.7 °F (36.5 °C) (08/17/22 1112)  Pulse: 72 (08/17/22 1112)  Resp: 20 (08/17/22 1112)  BP: 111/76 (08/17/22 1112)  SpO2: 99 % (08/17/22 1112)  Body mass index is 19.83 kg/m².  Weight: 64.5 kg (142 lb 3.2 oz) Vital Signs (24h Range):  Temp:  [97.7 °F (36.5 °C)-97.9 °F (36.6 °C)] 97.7 °F (36.5 °C)  Pulse:  [68-97] 72  Resp:  [18-20] 20  SpO2:  [87 %-99 %] 99 %  BP: (109-120)/(70-85) 111/76     Physical Exam  Constitutional:       General: He is not in acute distress.     Appearance: He is ill-appearing. He is not toxic-appearing.      Comments: Pleasant, cachetic gentleman   HENT:      Head: Normocephalic and atraumatic.      Mouth/Throat:      Mouth: Mucous membranes are moist.      Pharynx: Oropharynx is clear. No oropharyngeal exudate or posterior oropharyngeal erythema.   Eyes:      General: No scleral icterus.  Cardiovascular:      Rate and Rhythm: Normal rate and regular rhythm.      Heart sounds: Normal heart sounds.   Pulmonary:      Effort: Pulmonary effort is normal.      Breath sounds: Wheezing present.      Comments: Diminished breath sounds throughout right lung fields on anterior auscultation  Musculoskeletal:      Right lower leg: Edema present.      Left lower leg: Edema present.      Comments: 3+ edema to B/L LEs w/ dependent edema to T4-T5 B/L; Right arm with significant edema & tender to palpation though slightly improved.   Neurological:      General: No focal deficit present.      Mental Status: He is alert and oriented to person, place, and time.   Psychiatric:         Mood and Affect: Mood normal.         Behavior: Behavior normal.       Lines/Drains/Airways     Peripheral Intravenous Line  Duration                Peripheral IV - Single Lumen 08/14/22 2000  Left;Posterior Forearm 2 days                Significant Labs:    Lab Results   Component Value Date    WBC 7.4 08/16/2022    HGB 13.0 (L) 08/16/2022    HCT 40.5 (L) 08/16/2022    MCV 97.1 (H) 08/16/2022     08/16/2022   BNP of 3691.5, troponin 0.134      BMP  Lab Results   Component Value Date     08/17/2022    K 3.8 08/17/2022    CO2 43 (HH) 08/17/2022    BUN 19.0 08/17/2022    CREATININE 0.71 (L) 08/17/2022    CALCIUM 7.7 (L) 08/17/2022   Magnesium 1.5, phosphorus 5.7    ABG  Recent Labs   Lab 08/13/22  1247 08/16/22  0819   PH 7.302* 7.39   PO2 94 64*   PCO2 59.6* 90*   HCO3 29.4* 54.5   BE 3  --        Significant Imaging:  CV Ultrasound doppler venous arms bilateral  There was no evidence of deep or superficial vein thrombosis in the   bilateral upper extremities.  CV Ultrasound doppler venous legs bilat  There was no evidence of deep or superficial vein thrombosis in the   bilateral lower extremities.      Assessment/Plan:     Assessment  1. Left Lower Lobe Pulmonary Embolism  2. Large Right-sided pleural effusion w/ Atelectasis  3. HFrEF (EF of 15%, 8/2022) w/ elevated BNP  4. Compensated Respiratory Acidosis w/ contraction alkalosis  5. RUE Edema  6. Hx of COPD (unquantified)  7. History of tobacco and alcohol abuse  8. Hx of Multiple Pulmonary Irritants as listed in above HPI  9. Cachectic w/ x3 months early satiety  10. Complaints of diarrhea on admit C diff pending - diarrhea resolved    Plan  - Continue O2 supplementation (currently 2L NC); wean as tolerated to maintain O2 sats >90%  - Thoracentesis performed this AM (8/17/22); patient tolerated well   -- 1030 cc straw colored fluid removed   -- Pending pleural fluid studies for further diagnostics   -- Pending post-procedural CXR  - Lovenox held afternoon of (8/16/22); okay to resume FD Lovenox s/p normal CXR in setting of left pulmonary embolism  - HIV/Hepatitis Panel/Syphillis (all resulted negative)  - B/L LE & RUE U/S NIVA  (negative)  - TTE preliminary w/ EF of 15% w/ diastolic dysfunction   -- Would continue diuresis as tolerated; currently Lasix 20mg Q12h   -- Strict I's & O's (- 12.8L since admission)  - Further Recommendations per Pulmonologist    Polo Parnell MD   Internal Medicine PGY-II  Pulmonary Service    Attending Addendum to Follow

## 2022-08-17 NOTE — PT/OT/SLP PROGRESS
Physical Therapy Treatment    Patient Name:  Harpreet Rodas   MRN:  [id number]    Recommendations:     Discharge Recommendations:  rehabilitation facility   Discharge Equipment Recommendations: rollator, shower chair     Subjective     Patient awake and alert.     Objective:     General Precautions: Standard, fall   Orthopedic Precautions:    Braces:    Respiratory Status: Nasal cannula, flow 2 L/min     Communicated with nurse prior to treatment.     Functional Mobility:    Rolling:Stand-by Assistance  Supine to sit:Minimal Assistance  Sit to stand transfer: Minimal Assistance  Bed to chair transfer:Minimal assistance  BP supine 107/63, sitting 105/66. SPO2>92%. Gait 20 ft and 30 ft x 2 with RW min assist and sat dropping to 75% but increases quickly with rest.       AM-PAC 6 CLICK MOBILITY  Turning over in bed (including adjusting bedclothes, sheets and blankets)?: 3  Sitting down on and standing up from a chair with arms (e.g., wheelchair, bedside commode, etc.): 3  Moving from lying on back to sitting on the side of the bed?: 3  Moving to and from a bed to a chair (including a wheelchair)?: 3  Need to walk in hospital room?: 3  Climbing 3-5 steps with a railing?: 2  Basic Mobility Total Score: 17     Patient left up in chair with call button in reach..    GOALS:   Multidisciplinary Problems     Physical Therapy Goals        Problem: Physical Therapy    Goal Priority Disciplines Outcome Goal Variances Interventions   Physical Therapy Goal     PT, PT/OT Ongoing, Progressing     Description: Goals to be met by: 9/15/22     Patient will increase functional independence with mobility by performin. Supine to sit with Stand-by Assistance  2. Sit to stand transfer with Stand-by Assistance  3. Gait  x 150 feet with Stand-by Assistance using Rolling Walker.   4. Ascend/descend 12 stair with right Handrails Stand-by Assistance using No Assistive Device.                      Assessment:     Harpreet Rodas is a 56 y.o.  male admitted with a medical diagnosis of Anasarca.     Rehab Prognosis: Good; patient would benefit from acute skilled PT services to address these deficits and reach maximum level of function.    Recent Surgery: * No surgery found *      Plan:     During this hospitalization, patient to be seen 6 x/week to address the identified rehab impairments via gait training, therapeutic activities, therapeutic exercises and progress toward the following goals:    · Plan of Care Expires:  09/15/22    Billable Minutes     Billable Minutes: Gait Training 25    Treatment Type: Treatment  PT/PTA: PTA     PTA Visit Number: 2     08/17/2022

## 2022-08-17 NOTE — PT/OT/SLP PROGRESS
Occupational Therapy      Patient Name:  Harpreet Rodas   MRN:  [id number]    Patient not seen today secondary to just getting back to bed with x-ray tech, and very tired wishing to rest today. Will follow-up tomorrow as schedule permits.    8/17/2022

## 2022-08-17 NOTE — PROGRESS NOTES
[facility] - 3rd Floor Medical Telemetry  Cardiology  Progress Note    Patient Name: Harpreet Rodas  MRN: [id number]  Admission Date: 8/13/2022  Hospital Length of Stay: 4 days  Code Status: Full Code   Attending Provider: An aRosa Fry MD   Consulting Provider: ART De Oliveira  Primary Care Physician: Primary Doctor No  Principal Problem:Anasarca    Patient information was obtained from patient and ER records.     Subjective:     Chief Complaint:       HPI:   Mr. Mayen is a 55y/o male, unknown to [facility], with PMHx significant for COPD and previous heavy smoker/drinker who presented to [facility] via EMS for c/o SOB, MON, orthopnea, peripheral edema, diarrhea, and chest pain described as sticking sensation. No radiation or associated symptoms.  RA sat upon EMS arrival-88% and he was placed on Bipap. In ED, workup significant for BUN/creatinine 45/1.36, K 6.1, D-dimer 3.05, BNP 3280, Troponin 0.134-0.120-0.105. CXR revealing bilateral pleural effusions. Right> left with possible RLL opacification. CTA chest obtained LLL PE, large right pleural effusion, and findings of anasarca. EF revealing EF 15%, Grade III LVDD and RA/RV enlargement. He was placed on diuretics with consult placed to [facility] for PVC and 2nd degree Type II AVB. Review of tele reveals SR with non-conducted PACs. No 2nd degree heart block noted.     Hospital Course:  8/16/22: Patient awake in bed. Good diuresis with Lasix. Still volume overloaded.   8/17/22: Patient awake in bed. Continues to require O2 support. S/p right sided thoracentesis with reported removal of 1L fluid. CO2 rising. Patient started on diamox. Renal indices stable. -5.5L UOP x 24hr    PMH: COPD  PSH: mandible fx surgery,  Family History: Mother- cancer; brother- cancer  Social History: previous heavy smoker (2ppd)/drinker- quit 6/22    Previous Cardiac Diagnostics:   Venous US BUE 08/14/22:  No evidence of deep or superficial vein thrombosis in bilateral upper  extremities.     Venous US BLE 08/14/22:  Negative DVT to BLE    TTE 08/13/22:  Severely decreased Systolic function. Estimated EF 15%. Grade III LVDD. There is pulmonary HTN, Mild TR. Moderate RV enlargement. Moderate RA enlargement. Intermediate CVP 8 mmHg. Estimated PASP 50mmHg    Review of Systems   Constitutional: Positive for activity change and fatigue.   Respiratory: Positive for shortness of breath.    Cardiovascular: Positive for leg swelling. Negative for chest pain.   Gastrointestinal: Positive for abdominal distention.   Genitourinary: Positive for scrotal swelling.   Neurological: Negative.    Psychiatric/Behavioral: Negative.        Objective:     Vital Signs (Most Recent):  Temp: 97.8 °F (36.6 °C) (08/17/22 0724)  Pulse: 80 (08/17/22 0724)  Resp: 20 (08/17/22 0724)  BP: 119/85 (08/17/22 0724)  SpO2: 97 % (08/17/22 0724) Vital Signs (24h Range):  Temp:  [97.7 °F (36.5 °C)-98.2 °F (36.8 °C)] 97.8 °F (36.6 °C)  Pulse:  [68-97] 80  Resp:  [18-20] 20  SpO2:  [87 %-99 %] 97 %  BP: (109-120)/(70-85) 119/85     Weight: 64.5 kg (142 lb 3.2 oz)  Body mass index is 19.83 kg/m².    SpO2: 97 %  O2 Device (Oxygen Therapy): nasal cannula      Intake/Output Summary (Last 24 hours) at 8/17/2022 1024  Last data filed at 8/17/2022 0700  Gross per 24 hour   Intake 1080 ml   Output 6175 ml   Net -5095 ml       Lines/Drains/Airways     Peripheral Intravenous Line  Duration                Peripheral IV - Single Lumen 08/14/22 2000 Left;Posterior Forearm 2 days                Significant Labs:  Recent Results (from the past 72 hour(s))   Troponin I    Collection Time: 08/14/22  1:16 PM   Result Value Ref Range    Troponin-I 0.097 (H) 0.000 - 0.045 ng/mL   Echo    Collection Time: 08/14/22  3:20 PM   Result Value Ref Range    BSA 1.75 m2    TDI SEPTAL 0.05 m/s    LV LATERAL E/E' RATIO 16.80 m/s    LV SEPTAL E/E' RATIO 16.80 m/s    Right Atrial Pressure (from IVC) 8 mmHg    EF 15 %    Left Ventricular Outflow Tract Mean  Velocity 0.347 cm/s    Left Ventricular Outflow Tract Mean Gradient 1.00 mmHg    TDI LATERAL 0.05 m/s    LVIDd 5.60 3.5 - 6.0 cm    IVS 1.11 (A) 0.6 - 1.1 cm    Posterior Wall 1.05 0.6 - 1.1 cm    LVIDs 4.79 (A) 2.1 - 4.0 cm    FS 14 28 - 44 %    LV mass 243.27 g    LA size 4.00 cm    TAPSE 0.97 cm    Left Ventricle Relative Wall Thickness 0.38 cm    AV mean gradient 3 mmHg    AV valve area 1.24 cm2    AV Velocity Ratio 0.46     AV index (prosthetic) 0.39     MV mean gradient 1 mmHg    MV valve area p 1/2 method 3.33 cm2    MV valve area by continuity eq 1.12 cm2    E/A ratio 3.11     Mean e' 0.05 m/s    E wave deceleration time 190 msec    LVOT diameter 2.00 cm    LVOT area 3.1 cm2    LVOT peak destin 0.57 m/s    LVOT peak VTI 7.30 cm    Ao peak destin 1.23 m/s    Ao VTI 18.5 cm    LVOT stroke volume 22.92 cm3    AV peak gradient 6 mmHg    MV peak gradient 3 mmHg    TV rest pulmonary artery pressure 50 mmHg    E/E' ratio 16.80 m/s    MV Peak E Destin 0.84 m/s    TR Max Destin 3.23 m/s    MV VTI 20.4 cm    MV stenosis pressure 1/2 time 66 ms    MV Peak A Destin 0.27 m/s    LV Systolic Volume 107.00 mL    LV Systolic Volume Index 59.8 mL/m2    LV Diastolic Volume 154.00 mL    LV Diastolic Volume Index 86.03 mL/m2    LV Mass Index 136 g/m2    Triscuspid Valve Regurgitation Peak Gradient 42 mmHg   Troponin I    Collection Time: 08/14/22  6:36 PM   Result Value Ref Range    Troponin-I 0.120 (H) 0.000 - 0.045 ng/mL   Troponin I    Collection Time: 08/15/22  2:50 AM   Result Value Ref Range    Troponin-I 0.105 (H) 0.000 - 0.045 ng/mL   CBC with Differential    Collection Time: 08/15/22  3:29 AM   Result Value Ref Range    WBC 8.0 4.5 - 11.5 x10(3)/mcL    RBC 4.39 (L) 4.70 - 6.10 x10(6)/mcL    Hgb 13.5 (L) 14.0 - 18.0 gm/dL    Hct 44.2 42.0 - 52.0 %    .7 (H) 80.0 - 94.0 fL    MCH 30.8 27.0 - 31.0 pg    MCHC 30.5 (L) 33.0 - 36.0 mg/dL    RDW 13.4 11.5 - 17.0 %    Platelet 127 (L) 130 - 400 x10(3)/mcL    MPV 10.0 7.4 - 10.4 fL     Neut % 73.0 %    Lymph % 17.4 %    Mono % 7.9 %    Eos % 1.1 %    Basophil % 0.3 %    Lymph # 1.39 0.6 - 4.6 x10(3)/mcL    Neut # 5.8 2.1 - 9.2 x10(3)/mcL    Mono # 0.63 0.1 - 1.3 x10(3)/mcL    Eos # 0.09 0 - 0.9 x10(3)/mcL    Baso # 0.02 0 - 0.2 x10(3)/mcL    IG# 0.02 0 - 0.04 x10(3)/mcL    IG% 0.3 %    NRBC% 0.0 %   Comprehensive Metabolic Panel    Collection Time: 08/15/22  3:30 AM   Result Value Ref Range    Sodium Level 141 136 - 145 mmol/L    Potassium Level 3.5 3.5 - 5.1 mmol/L    Chloride 94 (L) 98 - 107 mmol/L    Carbon Dioxide 37 (H) 22 - 29 mmol/L    Glucose Level 99 74 - 100 mg/dL    Blood Urea Nitrogen 38.1 (H) 8.4 - 25.7 mg/dL    Creatinine 0.86 0.73 - 1.18 mg/dL    Calcium Level Total 8.0 (L) 8.4 - 10.2 mg/dL    Protein Total 5.5 (L) 6.4 - 8.3 gm/dL    Albumin Level 2.6 (L) 3.5 - 5.0 gm/dL    Globulin 2.9 2.4 - 3.5 gm/dL    Albumin/Globulin Ratio 0.9 (L) 1.1 - 2.0 ratio    Bilirubin Total 0.5 <=1.5 mg/dL    Alkaline Phosphatase 86 40 - 150 unit/L    Alanine Aminotransferase 23 0 - 55 unit/L    Aspartate Aminotransferase 25 5 - 34 unit/L    eGFR >60 mls/min/1.73/m2   Phosphorus    Collection Time: 08/15/22  3:30 AM   Result Value Ref Range    Phosphorus Level 4.2 2.3 - 4.7 mg/dL   Magnesium    Collection Time: 08/15/22  3:30 AM   Result Value Ref Range    Magnesium Level 1.50 (L) 1.60 - 2.60 mg/dL   Lipid Panel    Collection Time: 08/16/22  3:53 AM   Result Value Ref Range    Cholesterol Total 104 <=200 mg/dL    HDL Cholesterol 33 (L) 35 - 60 mg/dL    Triglyceride 71 34 - 140 mg/dL    Cholesterol/HDL Ratio 3 0 - 5    Very Low Density Lipoprotein 14     LDL Cholesterol 57.00 50.00 - 140.00 mg/dL   Basic Metabolic Panel    Collection Time: 08/16/22  3:53 AM   Result Value Ref Range    Sodium Level 140 136 - 145 mmol/L    Potassium Level 3.2 (L) 3.5 - 5.1 mmol/L    Chloride 88 (L) 98 - 107 mmol/L    Carbon Dioxide 41 (HH) 22 - 29 mmol/L    Glucose Level 70 (L) 74 - 100 mg/dL    Blood Urea Nitrogen 26.2  (H) 8.4 - 25.7 mg/dL    Creatinine 0.70 (L) 0.73 - 1.18 mg/dL    BUN/Creatinine Ratio 37     Calcium Level Total 7.8 (L) 8.4 - 10.2 mg/dL    Anion Gap 11.0 mEq/L    eGFR >60 mls/min/1.73/m2   Magnesium    Collection Time: 08/16/22  3:53 AM   Result Value Ref Range    Magnesium Level 1.50 (L) 1.60 - 2.60 mg/dL   CBC with Differential    Collection Time: 08/16/22  3:53 AM   Result Value Ref Range    WBC 7.4 4.5 - 11.5 x10(3)/mcL    RBC 4.17 (L) 4.70 - 6.10 x10(6)/mcL    Hgb 13.0 (L) 14.0 - 18.0 gm/dL    Hct 40.5 (L) 42.0 - 52.0 %    MCV 97.1 (H) 80.0 - 94.0 fL    MCH 31.2 (H) 27.0 - 31.0 pg    MCHC 32.1 (L) 33.0 - 36.0 mg/dL    RDW 13.3 11.5 - 17.0 %    Platelet 172 130 - 400 x10(3)/mcL    MPV 11.9 (H) 7.4 - 10.4 fL    Neut % 70.4 %    Lymph % 18.4 %    Mono % 8.8 %    Eos % 1.6 %    Basophil % 0.4 %    Lymph # 1.36 0.6 - 4.6 x10(3)/mcL    Neut # 5.2 2.1 - 9.2 x10(3)/mcL    Mono # 0.65 0.1 - 1.3 x10(3)/mcL    Eos # 0.12 0 - 0.9 x10(3)/mcL    Baso # 0.03 0 - 0.2 x10(3)/mcL    IG# 0.03 0 - 0.04 x10(3)/mcL    IG% 0.4 %    NRBC% 0.0 %   HIV 1/2 Ag/Ab (4th Gen)    Collection Time: 08/16/22  3:53 AM   Result Value Ref Range    HIV Nonreactive Nonreactive   Hepatitis Panel, Acute    Collection Time: 08/16/22  3:53 AM   Result Value Ref Range    Hepatitis A IgM Nonreactive Nonreactive    Hepatitis B Core IgM Nonreactive Nonreactive    Hepatitis B Surface Antigen Nonreactive Nonreactive    Hepatitis C Antibody Nonreactive Nonreactive   SYPHILIS ANTIBODY (WITH REFLEX RPR)    Collection Time: 08/16/22  3:53 AM   Result Value Ref Range    Syphilis Antibody Nonreactive Nonreactive, Equivocal   POCT ARTERIAL BLOOD GAS    Collection Time: 08/16/22  8:19 AM   Result Value Ref Range    POC PH 7.39 7.35 - 7.45    POC PCO2 90 (AA) 19 - 50 mmHg    POC PO2 64 (A) 80 - 100 mmHg    POC HEMOGLOBIN 13.3 12.0 - 16.0 g/dL    POC SATURATED O2 91.9 %    POC O2Hb 91.7 (A) 94.0 - 97.0 %    POC COHb 2.3 %    POC MetHb 1.2 0.40 - 1.5 %    POC  Potassium 2.7 (A) 3.5 - 5.0 mmol/l    POC Sodium 132 (A) 137 - 145 mmol/l    POC Ionized Calcium 1.01 (A) 1.12 - 1.23 mmol/l    Correct Temperature (PH) 7.39 7.35 - 7.45    Corrected Temperature (pCO2) 90 (AA) 19 - 50 mmHg    Corrected Temperature (pO2) 64 (A) 80 - 100 mmHg    POC HCO3 54.5 mmol/l    Base Deficit 24.0 (A) -2.00 - 2.00 mmol/l    POC Temp 37.0 °C    Specimen source Arterial sample    Basic Metabolic Panel    Collection Time: 08/17/22  5:37 AM   Result Value Ref Range    Sodium Level 139 136 - 145 mmol/L    Potassium Level 3.8 3.5 - 5.1 mmol/L    Chloride 86 (L) 98 - 107 mmol/L    Carbon Dioxide 43 (HH) 22 - 29 mmol/L    Glucose Level 98 74 - 100 mg/dL    Blood Urea Nitrogen 19.0 8.4 - 25.7 mg/dL    Creatinine 0.71 (L) 0.73 - 1.18 mg/dL    BUN/Creatinine Ratio 27     Calcium Level Total 7.7 (L) 8.4 - 10.2 mg/dL    Anion Gap 10.0 mEq/L    eGFR >60 mls/min/1.73/m2   Magnesium    Collection Time: 08/17/22  5:37 AM   Result Value Ref Range    Magnesium Level 1.90 1.60 - 2.60 mg/dL   Phosphorus    Collection Time: 08/17/22  5:37 AM   Result Value Ref Range    Phosphorus Level 1.7 (L) 2.3 - 4.7 mg/dL       Significant Imaging:  Imaging Results          CTA Chest Abdomen Non Coronary (Final result)  Result time 08/13/22 15:55:52   Procedure changed from CTA Chest Non-Coronary (PE Study)     Final result by Basilio De Oliveira MD (08/13/22 15:55:52)                 Impression:      Left lower lobe pulmonary thromboembolism is noted.    Large right pleural effusion is present.    Findings of anasarca.    The bladder wall is prominent.  Correlate with urinalysis.    There are cortical defects of the bilateral kidneys likely related to chronic renal disease and scarring, however infarcts are less likely.    The liver appears heterogeneous which may be related to phase of contrast however is cirrhosis is not excluded.  Cardiomegaly is present.  Further evaluation may be obtained with ECHO.    Findings reported to  Dr. Little prior to interpretation.      Electronically signed by: Basilio De Oliveira  Date:    08/13/2022  Time:    15:55             Narrative:    EXAMINATION:  CTA CHEST ABDOMEN NON CORONARY (XPD)    CLINICAL HISTORY:  Pulmonary embolism (PE) suspected, positive D-dimer;    TECHNIQUE:  Axial CTA images of the chest, abdomen, and pelvis were obtained With Contrast. Sagittal and coronal reconstructed images were available for review.    Automatic exposure control was utilized to reduce the patient's radiation dose.    DLP = 582    COMPARISON:  No prior images available for comparison.    FINDINGS:  PULMONARY ARTERY: Thrombus is identified in the left lower lobe pulmonary artery.    AORTA: The thoracoabdominal aorta is normal in course and caliber. Scattered atherosclerotic disease is noted.    HEART: The heart is enlarged.  No pericardial effusion.    THYROID GLAND: The thyroid is not enlarged. There are no nodules identified.    AIRWAYS: Trachea is midline and tracheobronchial tree is patent.    LUNGS: Large right effusion with subsegmental atelectatic changes at the right base.  Emphysematous changes throughout the lungs.    THROACIC LYMPH NODES: There is no significant mediastinal, axillary or hilar lymphadenopathy.    HEPATOBILIARY: Somewhat nodular contour of the superior aspect of the liver with some heterogeneity may be related to phase of contrast versus cirrhosis.  Correlate with patient's history.  The gallbladder is normal.    SPLEEN: Normal    PANCREAS: No focal masses or ductal dilatation.    ADRENALS: No adrenal nodules.    KIDNEYS: No evidence of hydronephrosis.  Bilateral renal cortical perfusional defects likely related to scarring in chronic kidney disease.  Correlate with patient's history.  Less likely infarcts.    ABDOMINAL LYMPHADENOPATHY/RETROPERITONEUM: There is no retroperitoneal lymphadenopathy.    BOWEL: No acute bowel related abnormalities.    PELVIC VISCERA: The bladder wall is somewhat  prominent.  Correlate with urinalysis.    PELVIC LYMPH NODES: No lymphadenopathy.    PERITONEUM/ BODY WALL: Diffuse body wall edema with moderate ascites noted.    SKELETAL: No aggressive appearing lytic/blastic lesion. No acute fractures, subluxations or dislocations.                               X-Ray Chest 1 View (Final result)  Result time 08/13/22 12:44:56    Final result by Basilio De Oliveira MD (08/13/22 12:44:56)                 Impression:      Right greater than left pleural effusion with possible right lower lobe opacification.  Underlying infectious process is not excluded.  Recommend continued follow-up.      Electronically signed by: Basilio De Oliveira  Date:    08/13/2022  Time:    12:44             Narrative:    EXAMINATION:  XR CHEST 1 VIEW    CLINICAL HISTORY:  shortness of breath;    TECHNIQUE:  Single view of the chest    COMPARISON:  No prior imaging available for comparison.    FINDINGS:  Right greater than left pleural effusion with possible right lower lobe opacification.  Underlying infectious process is not excluded.  Recommend continued follow-up.                                EKG:      Telemetry:        Physical Exam  HENT:      Mouth/Throat:      Mouth: Mucous membranes are moist.      Comments: Poor dentition  Cardiovascular:      Rate and Rhythm: Normal rate and regular rhythm.      Heart sounds: Normal heart sounds.      Comments: anasarca  Pulmonary:      Breath sounds: Rales present.      Comments: Conversational dyspnea  Abdominal:      Palpations: Abdomen is soft.   Musculoskeletal:      Right lower leg: Edema present.      Left lower leg: Edema present.   Skin:     General: Skin is warm.   Neurological:      General: No focal deficit present.      Mental Status: He is alert.   Psychiatric:         Mood and Affect: Mood normal.         Home Medications:   No current facility-administered medications on file prior to encounter.     Current Outpatient Medications on File Prior to  Encounter   Medication Sig Dispense Refill    albuterol sulfate (INV ALBUTEROL) 90 mcg inhalation Inhale into the lungs as needed. Take one puff by mouth as directed by Physician.         Current Inpatient Medications:    Current Facility-Administered Medications:     acetaminophen tablet 650 mg, 650 mg, Oral, Q4H PRN, Reginald Barker MD, 650 mg at 08/16/22 2222    acetaZOLAMIDE injection 500 mg, 500 mg, Intravenous, Once, Kwasi Thao MD    albuterol-ipratropium 2.5 mg-0.5 mg/3 mL nebulizer solution 3 mL, 3 mL, Nebulization, Q4H PRN, Reginald Barker MD    aspirin chewable tablet 81 mg, 81 mg, Oral, Daily, Jaylin Grissom, ART, 81 mg at 08/16/22 0939    atorvastatin tablet 40 mg, 40 mg, Oral, QHS, Kwasi Thao MD, 40 mg at 08/16/22 2011    enoxaparin injection 80 mg, 80 mg, Subcutaneous, Q12H, Kwasi Thao MD, 80 mg at 08/16/22 0459    famotidine tablet 20 mg, 20 mg, Oral, BID, Reginald Barker MD, 20 mg at 08/16/22 2011    furosemide injection 20 mg, 20 mg, Intravenous, Q12H, Kwasi Thao MD    LIDOcaine (PF) 20 mg/mL (2%) 20 mg/mL (2 %) injection, , , ,     magnesium oxide tablet 400 mg, 400 mg, Oral, TID, Kwasi Thao MD, 400 mg at 08/16/22 2011    magnesium sulfate 2g in water 50mL IVPB (premix), 2 g, Intravenous, Once, Kwasi Thao MD    melatonin tablet 6 mg, 6 mg, Oral, Nightly PRN, Reginald Barker MD    potassium phosphate 20 mmol in dextrose 5 % 500 mL infusion, 20 mmol, Intravenous, Once, Kwasi Thao MD    potassium, sodium phosphates 280-160-250 mg packet 1 packet, 1 packet, Oral, QID (WM & HS), Kwasi Thao MD    promethazine tablet 25 mg, 25 mg, Oral, Q6H PRN, Reginald Barker MD    sodium chloride 0.9% flush 10 mL, 10 mL, Intravenous, PRN, Yuni Little MD    sodium chloride 0.9% flush 10 mL, 10 mL, Intravenous, PRN, Reginald Barker MD         VTE Risk Mitigation (From admission, onward)         Ordered     enoxaparin injection 80 mg  Every 12 hours (non-standard  times)         08/15/22 1314     IP VTE HIGH RISK PATIENT  Once         08/13/22 1523     Place sequential compression device  Until discontinued         08/13/22 1523     Place GIACOMO hose  Until discontinued         08/13/22 1523                Assessment:   CMO, unspecified  --EF 15%  Acute combined systolic and diastolic HF  --EF 15%; Grade III LVDD  --BNP 3280  --Anasarca  NSTEMI, Type II s/t heart failure  --Troponin 0.134->0.120->0.105  LLL PE  Left BBB  ANNETTA  --improving with diuresis  COPD  Former smoker  No hx GIB    Plan:   Excellent UOP. Still overloaded. Continue diuresis. Ensure accurate I&O's/daily weights  Will start GDMT once patient is closer to euvolemia  Troponin elevation with flat trend likely s/t CMO. Will plan for coronary evaluation once euvolemic pending stable renal function. Decrease aspirin to 81 mg daily. Continue weight based lovenox bid.  Lifevest will be needed prior to DC- will discuss with lifevest rep financial assistance for lifevest due to self pay status.  No indication for mechanical thrombectomy as patient is hemodynamically stable. Continue weight based Lovenox with plans to transition to DOAC prior to DC.           ART De Oliveira  Cardiology  [facility] - 3rd Floor Medical Telemetry  08/17/2022

## 2022-08-17 NOTE — PLAN OF CARE
Problem: Adult Inpatient Plan of Care  Goal: Plan of Care Review  Outcome: Ongoing, Progressing  Goal: Patient-Specific Goal (Individualized)  Outcome: Ongoing, Progressing  Goal: Absence of Hospital-Acquired Illness or Injury  Outcome: Ongoing, Progressing  Goal: Optimal Comfort and Wellbeing  Outcome: Ongoing, Progressing  Goal: Readiness for Transition of Care  Outcome: Ongoing, Progressing     Problem: Infection  Goal: Absence of Infection Signs and Symptoms  Outcome: Ongoing, Progressing  Intervention: Prevent or Manage Infection  Flowsheets (Taken 8/16/2022 2118)  Infection Management: aseptic technique maintained     Problem: Skin Injury Risk Increased  Goal: Skin Health and Integrity  Outcome: Ongoing, Progressing  Intervention: Optimize Skin Protection  Flowsheets (Taken 8/16/2022 2118)  Pressure Reduction Techniques: frequent weight shift encouraged  Head of Bed (HOB) Positioning: HOB at 20-30 degrees  Intervention: Promote and Optimize Oral Intake  Flowsheets (Taken 8/16/2022 2118)  Oral Nutrition Promotion: physical activity promoted     Problem: Fall Injury Risk  Goal: Absence of Fall and Fall-Related Injury  Outcome: Ongoing, Progressing

## 2022-08-17 NOTE — PROCEDURES
"Harpreet Rodas is a 56 y.o. male patient.    Temp: 97.7 °F (36.5 °C) (08/17/22 1112)  Pulse: 72 (08/17/22 1112)  Resp: 20 (08/17/22 1112)  BP: 111/76 (08/17/22 1112)  SpO2: 99 % (08/17/22 1112)  Weight: 64.5 kg (142 lb 3.2 oz) (08/17/22 0608)  Height: 5' 11" (180.3 cm) (08/13/22 1227)       Thoracentesis    Date/Time: 8/17/2022 9:45 AM  Location procedure was performed: [facility] PULMONARY MEDICINE  Performed by: Polo Parnell MD  Authorized by: Johnathan Patel MD   Assisting provider: Harpreet Mancuso DO  Pre-operative diagnosis: Right Pleural Effusion  Post-operative diagnosis: Right Pleural Effusion  Consent Done: Emergent Situation  Procedure purpose: diagnostic and therapeutic  Preparation: Patient was prepped and draped in the usual sterile fashion.  Local anesthesia used: yes  Anesthesia: local infiltration    Anesthesia:  Local anesthesia used: yes  Local Anesthetic: lidocaine 2% without epinephrine  Anesthetic total: 5 mL    Patient sedated: no  Preparation: skin prepped with ChloraPrep  Patient position: supported by bedside stand  Ultrasound guidance: no  Location: right posterior  Intercostal space: 6th  Puncture method: over-the-needle catheter  Needle size: 18  Catheter size: 18 gauge  Number of attempts: 1  Drainage amount: 1030 ml  Drainage characteristics: clear and serous  Patient tolerance: Patient tolerated the procedure well with no immediate complications  Chest x-ray performed: yes (Pending)  Complications: No  Estimated blood loss (mL): 1  Specimens: Yes  Implants: No  Drainage Tube: removed        8/17/2022    "

## 2022-08-17 NOTE — PROGRESS NOTES
"[facility] MEDICINE PROGRESS NOTE      Patient Name: Harpreet Rodas  MRN: [id number]  Admission Date: 8/13/2022 12:32 PM  Status: IP- Inpatient   Length of Stay: 4 days  Face-to-Face encounter date: 08/17/2022       CHIEF COMPLAINT  Shortness of Breath (Pt to er via aasi with c/o sob and swelling to extremities.)    HOSPITAL COURSE  56 y.o. male who  has a past medical history of COPD (chronic obstructive pulmonary disease).. The patient presented to [facility] on 8/13/2022 with a primary complaint of dyspnea on exertion, edema and chest pain.      The pt says for about 6 months he has had worsening dyspnea on exertion. Currently, just walking in his living room make him extremely short of breath. He started having swelling of his right arm and both legs 3 days ago. He started having diarrhea at the same time. He has lost weight. He says he gets hungry but feels full after a few bites and the food goes right through him. He came to the ER he began having left side chest pain, "a sticking sensation". He denies associated symptoms it only lasts a short time. No radiation. No known hx of CAD. Denies fever, chills or sweats. He quit smoking and drinking about 2 months ago after his wife had pneumonia and his breathing was worsening. He was drinking 4-5 40oz beers a week prior to quitting. The pt became very short of breath when trying to get into wheelchair for chest CT. However, he was able to tolerate CT and lay flat when transported by bed. He was given lasix prior to my arrival. He says he is feeling better. He is not on home oxygen. Only home med is an inhaler.    Today (08/17/2022):  He reports shortness of breath is much better, denies chest pain.  He remains on 2L NC oxygen. No fever. Had thoracentesis this morning.     OBJECTIVE    VITAL SIGNS: 24 HRS MIN & MAX LAST   Temp  Min: 97.7 °F (36.5 °C)  Max: 97.9 °F (36.6 °C) 97.7 °F (36.5 °C)   BP  Min: 109/72  Max: 120/70 111/76 "   Pulse  Min: 68  Max: 97  72   Resp  Min: 18  Max: 20 20   SpO2  Min: 87 %  Max: 99 % 99 %       LABS/MICROBIOLOGY/MEDICATIONS/DIAGNOSTICS  I have reviewed all pertinent lab results within the past 24 hours.    Recent Labs   Lab 08/14/22  0658 08/15/22  0329 08/16/22  0353   WBC 10.7 8.0 7.4   RBC 4.97 4.39* 4.17*   HGB 15.3 13.5* 13.0*   HCT 51.3 44.2 40.5*   .2* 100.7* 97.1*   MCH 30.8 30.8 31.2*   MCHC 29.8* 30.5* 32.1*   RDW 13.6 13.4 13.3    127* 172   MPV 10.5* 10.0 11.9*       Recent Labs   Lab 08/13/22  1247 08/13/22  1247 08/13/22  1255 08/13/22  1255 08/14/22  0658 08/15/22  0330 08/16/22  0353 08/16/22  0819 08/17/22  0537 08/17/22  1022   NA  --   --  140   < > 139 141 140  --  139  --    K  --   --  6.1*   < > 5.2* 3.5 3.2*  --  3.8  --    CO2  --   --  28   < > 33* 37* 41*  --  43*  --    BUN  --   --  45.0*   < > 43.2* 38.1* 26.2*  --  19.0  --    CREATININE  --   --  1.36*   < > 1.09 0.86 0.70*  --  0.71*  --    CALCIUM  --   --  8.9   < > 8.6 8.0* 7.8*  --  7.7*  --    PH 7.302 7.302*  --   --   --   --   --  7.39  --   --    MG  --   --  1.80   < > 1.50* 1.50* 1.50*  --  1.90  --    ALBUMIN  --   --  2.9*   < > 2.6* 2.6*  --   --   --  2.5*   ALKPHOS  --   --  85  --  69 86  --   --   --   --    ALT  --   --  23  --  21 23  --   --   --   --    AST  --   --  37*  --  25 25  --   --   --   --    BILITOT  --   --  1.2  --  0.7 0.5  --   --   --   --     < > = values in this interval not displayed.       Recent Labs     08/15/22  0329 08/16/22  0353   WBC 8.0 7.4   RBC 4.39* 4.17*   HGB 13.5* 13.0*   HCT 44.2 40.5*   .7* 97.1*   MCH 30.8 31.2*   MCHC 30.5* 32.1*   RDW 13.4 13.3     Recent Labs     08/14/22  1316 08/14/22  1836 08/15/22  0250 08/15/22  0330 08/15/22  0330 08/16/22  0353 08/17/22  0537 08/17/22  1022   CHLORIDE  --   --   --  94*   < > 88* 86*  --    CO2  --   --   --  37*   < > 41* 43*  --    BUN  --   --   --  38.1*   < > 26.2* 19.0  --    CREATININE  --   --    --  0.86   < > 0.70* 0.71*  --    GLUCOSE  --   --   --  99   < > 70* 98  --    CALCIUM  --   --   --  8.0*   < > 7.8* 7.7*  --    ALBUMIN  --   --   --  2.6*  --   --   --  2.5*   ALKPHOS  --   --   --  86  --   --   --   --    ALT  --   --   --  23  --   --   --   --    AST  --   --   --  25  --   --   --   --    TROPONINI 0.097* 0.120* 0.105*  --   --   --   --   --     < > = values in this interval not displayed.       Microbiology Results (last 7 days)     Procedure Component Value Units Date/Time    Fungal Culture [[id number]] Collected: 08/17/22 1052    Order Status: Sent Specimen: Thoracentesis Fluid Updated: 08/17/22 1135    Fungal Culture [[id number]] Collected: 08/17/22 1052    Order Status: Sent Specimen: Body Fluid from Pleural Fluid Updated: 08/17/22 1132    Gram Stain [[id number]] Collected: 08/17/22 1052    Order Status: Sent Specimen: Body Fluid from Pleural Fluid Updated: 08/17/22 1132    Mycobacteria and Nocardia Culture [[id number]] Collected: 08/17/22 1052    Order Status: Sent Specimen: Body Fluid from Thoracentesis Fluid Updated: 08/17/22 1132    Body Fluid Culture [[id number]] Collected: 08/17/22 1052    Order Status: Sent Specimen: Body Fluid from Thoracentesis Fluid Updated: 08/17/22 1131    Mycobacteria and Nocardia Culture [[id number]] Collected: 08/17/22 1052    Order Status: Sent Specimen: Body Fluid from Thoracentesis Fluid Updated: 08/17/22 1131    Gram Stain [[id number]] Collected: 08/17/22 1052    Order Status: Sent Specimen: Thoracentesis Fluid     Blood Culture [[id number]]  (Normal) Collected: 08/13/22 1314    Order Status: Completed Specimen: Blood from Arm, Left Updated: 08/16/22 2100     CULTURE, BLOOD ([facility]) No Growth At 72 Hours    Blood Culture [[id number]]  (Normal) Collected: 08/13/22 1314    Order Status: Completed Specimen: Blood from Hand, Left Updated: 08/16/22 2100     CULTURE, BLOOD ([facility]) No Growth At 72 Hours    Respiratory Culture [[id number]]     Order Status: Sent  Specimen: Sputum, Expectorated     Urine culture [[id number]] Collected: 08/13/22 1254    Order Status: Completed Specimen: Urine, Clean Catch Updated: 08/15/22 0958     Urine Culture No Growth    Clostridium Diff Toxin, A & B, EIA [[id number]] Collected: 08/13/22 2310    Order Status: Sent Specimen: Stool     Stool Culture **CANNOT BE ORDERED STAT** [[id number]]     Order Status: Sent Specimen: Stool            X-Ray Chest 1 View  Narrative: EXAMINATION:  XR CHEST 1 VIEW    CLINICAL HISTORY:  S/p right thoracentesis; right pleural effusion; .    COMPARISON:  Chest x-ray 08/13/2022    FINDINGS:  Frontal view of the chest was obtained.  The cardiac silhouette is enlarged, grossly stable.  The aorta is partially calcified.  There is prominence and indistinctness of the central vasculature and monie which may reflect pulmonary vascular congestion.  Small bilateral pleural effusions are seen.  The right pleural effusion appears to have decreased in size, compatible with thoracentesis.  There appears to be small right apical and lateral lower thoracic pneumothorax.  There are increased interstitial opacities in bilateral lungs which could reflect interstitial edema.  Bilateral basilar predominant airspace opacities are seen with mild improved aeration in the right lung base, possibly related to atelectasis, but underlying infiltrate cannot be excluded.  Calcified pleural plaques in the lower left thorax are again noted.  Thoracic dextroscoliosis is seen.  Impression: 1. The right pleural effusion appears to have decreased in size, compatible with thoracentesis.  There appears to be small right apical and lateral lower thoracic pneumothorax.  The findings are reported to to the patient's unit nurse, Brenden, on 8/17/2022 at approximately 12:16.  A secure epic message regarding the findings was also sent to Dr. Parnell at the same time.    Electronically signed by: Brando Hill  MD  Date:    08/17/2022  Time:    12:18        Scheduled Med:   aspirin  81 mg Oral Daily    atorvastatin  40 mg Oral QHS    enoxaparin  80 mg Subcutaneous Q12H    famotidine  20 mg Oral BID    furosemide (LASIX) injection  20 mg Intravenous Q12H    LIDOcaine (PF) 20 mg/mL (2%)        magnesium oxide  400 mg Oral TID    potassium phosphate IVPB  20 mmol Intravenous Once    potassium, sodium phosphates  1 packet Oral QID ([facility] & [facility])        Continuous Infusions:       PRN Meds:  acetaminophen, albuterol-ipratropium, melatonin, promethazine, sodium chloride 0.9%, sodium chloride 0.9%       PHYSICAL EXAM  Constitutional: Appears chronically ill looking. Cachectic. No acute distress.   Head/Eyes/Ears/Nose/Mouth/Throat: Normocephalic, atraumatic. PERRLA, EOM intact. Conjunctive clear, sclera white. Pinna no masses, lesions, tenderness, drainage. Pharynx pink. Poor dentition.  Neck: No JVD present. Normal alignment and mobility.  Cardiovascular: Regular rate and rhythm, S1 / S2, no systolic or diastolic murmur, no rubs or gallops.  Pulmonary/Chest: Effort normal. No respiratory distress. Breath sounds decreased bilaterally, bilateral rales.   Abdominal: Flat, soft, non-tender, non-distended, no rebound tenderness or guarding, normal bowel sounds in all four quadrants.  Musculoskeletal: Extremities symmetric, no tenderness, weakness, or swelling. Maintains flexion against resistance.   Extremities: Anasarca with dependent edema to his back. 3+ pitting edema right upper extremity edema and 1+ bilateral lower extremity edema, + scrotal edema.  Neurological: Alert and oriented to person, place, and time. Cooperative. Speech clear, appropriate. No dysarthria. Cranial nerves II-XII grossly intact. No focal deficits.  Psychiatric: No depression, anxiety or agitation. Normal affect, no hallucinations or suicidal ideations, no pressured speech.      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  No focal suspicious liver lesion  Acute NSTEMI, type I vs. Type II  Right lower lobe atelectasis, doubt pneumonia  Dyspnea on exertion  Diarrhea: resolved  Lactic acidosis: Resolved  COPD, emphysema  Ex-smoker, quit 2 months ago  Previous alcohol abuse  Leukocytosis  Hypomagnesemia, hypokalemia, hypophosphatemia  PVCs   Left bundle branch block  Moderate ascites   Small right apical and lateral lower thoracic pneumothorax      PLAN  Continue supplemental oxygen, wean oxygen as tolerated, keep SaO2 greater than 88%  Assess need for home oxygen prior to discharge  Bronchodilators  Blood cultures and urine culture NGTD   F/U pleural fluid analysis studies  Off IV Levaquin  Echocardiogram showed EF 15%, grade III left ventricular diastolic dysfunction, pulmonary hypertension, mild tricuspid regurgitation, moderate right atrial enlargement  Decreased IV Lasix to 20 mg q 12 hrs, replace electrolytes as needed  S/p 500 mg IV Acetazolamide x 2 given contraction metabolic alkalosis due to lasix  ASA, statin. To start GDMT when close to euvolemia. Will need Lifevest prior to discharge  Will need ischemic work up inpatient when CHF improves  Continue therapeutic dose Lovenox. Plan to switch to oral anticoagulant prior to discharge  Bilateral upper and lower extremity Doppler ultrasound negative for deep or superficial  vein thrombosis   HIV, Hepatitis panel, Syphilis all negative  Appreciate input of consultants  Replace electrolytes as needed  DVT Prophylaxis: on Lovenox  CODE STATUS: full code    Patient condition: Critical  Critical Care diagnosis:  Acute respiratory failure, volume overload, acute PE, acute CHF exacerbation  Critical care time: 40 minutes    Anticipated discharge and disposition: TBD  __________________________________________________________________________    All diagnosis and differential diagnosis have been reviewed; assessment and plan have been documented. I have personally reviewed the test results that are presently available; I have reviewed the patient's medication list. I have reviewed the consulting providers' recommendations. I have reviewed or attempted to review medical records based upon their availability.  All of the patient's questions have been addressed and answered. Patient is agreeable to the above stated plan. I will continue to monitor closely and make adjustment to medical management as needed.    This document was created with the assistance of a voice recognition software. There may be transcription errors as a result of using this technology, however minimal. Effort has been made to ensure accuracy of transcription but any obvious errors or omissions should be clarified with the author of this document.        Kwasi Thao MD   [facility] Medicine  08/17/2022

## 2022-08-18 LAB
ANION GAP SERPL CALC-SCNC: 10 MEQ/L
BACTERIA BLD CULT: NORMAL
BACTERIA BLD CULT: NORMAL
BASOPHILS # BLD AUTO: 0.03 X10(3)/MCL (ref 0–0.2)
BASOPHILS NFR BLD AUTO: 0.4 %
BUN SERPL-MCNC: 16 MG/DL (ref 8.4–25.7)
CALCIUM SERPL-MCNC: 8 MG/DL (ref 8.4–10.2)
CBG: 86
CBG: 98
CHLORIDE SERPL-SCNC: 89 MMOL/L (ref 98–107)
CO2 SERPL-SCNC: 39 MMOL/L (ref 22–29)
CREAT SERPL-MCNC: 0.69 MG/DL (ref 0.73–1.18)
CREAT/UREA NIT SERPL: 23
EOSINOPHIL # BLD AUTO: 0.11 X10(3)/MCL (ref 0–0.9)
EOSINOPHIL NFR BLD AUTO: 1.4 %
ERYTHROCYTE [DISTWIDTH] IN BLOOD BY AUTOMATED COUNT: 13.2 % (ref 11.5–17)
GFR SERPLBLD CREATININE-BSD FMLA CKD-EPI: >60 MLS/MIN/1.73/M2
GLUCOSE SERPL-MCNC: 96 MG/DL (ref 74–100)
GRAM STN SPEC: NORMAL
HCT VFR BLD AUTO: 40.4 % (ref 42–52)
HGB BLD-MCNC: 12.2 GM/DL (ref 14–18)
IMM GRANULOCYTES # BLD AUTO: 0.03 X10(3)/MCL (ref 0–0.04)
IMM GRANULOCYTES NFR BLD AUTO: 0.4 %
LYMPHOCYTES # BLD AUTO: 1.36 X10(3)/MCL (ref 0.6–4.6)
LYMPHOCYTES NFR BLD AUTO: 16.7 %
MAGNESIUM SERPL-MCNC: 2 MG/DL (ref 1.6–2.6)
MCH RBC QN AUTO: 30.3 PG (ref 27–31)
MCHC RBC AUTO-ENTMCNC: 30.2 MG/DL (ref 33–36)
MCV RBC AUTO: 100.5 FL (ref 80–94)
MONOCYTES # BLD AUTO: 0.82 X10(3)/MCL (ref 0.1–1.3)
MONOCYTES NFR BLD AUTO: 10.1 %
NEUTROPHILS # BLD AUTO: 5.8 X10(3)/MCL (ref 2.1–9.2)
NEUTROPHILS NFR BLD AUTO: 71 %
NRBC BLD AUTO-RTO: 0 %
PHOSPHATE SERPL-MCNC: 2.7 MG/DL (ref 2.3–4.7)
PLATELET # BLD AUTO: 131 X10(3)/MCL (ref 130–400)
PMV BLD AUTO: 10.3 FL (ref 7.4–10.4)
POCT GLUCOSE: 81 MG/DL (ref 70–110)
POTASSIUM SERPL-SCNC: 3.8 MMOL/L (ref 3.5–5.1)
RBC # BLD AUTO: 4.02 X10(6)/MCL (ref 4.7–6.1)
SODIUM SERPL-SCNC: 138 MMOL/L (ref 136–145)
WBC # SPEC AUTO: 8.1 X10(3)/MCL (ref 4.5–11.5)

## 2022-08-18 PROCEDURE — 97530 THERAPEUTIC ACTIVITIES: CPT | Mod: CQ

## 2022-08-18 PROCEDURE — 97535 SELF CARE MNGMENT TRAINING: CPT | Mod: CO

## 2022-08-18 PROCEDURE — 85025 COMPLETE CBC W/AUTO DIFF WBC: CPT | Performed by: INTERNAL MEDICINE

## 2022-08-18 PROCEDURE — 25000003 PHARM REV CODE 250: Performed by: INTERNAL MEDICINE

## 2022-08-18 PROCEDURE — 80048 BASIC METABOLIC PNL TOTAL CA: CPT | Performed by: INTERNAL MEDICINE

## 2022-08-18 PROCEDURE — 27000221 HC OXYGEN, UP TO 24 HOURS

## 2022-08-18 PROCEDURE — 84100 ASSAY OF PHOSPHORUS: CPT | Performed by: INTERNAL MEDICINE

## 2022-08-18 PROCEDURE — 36415 COLL VENOUS BLD VENIPUNCTURE: CPT | Performed by: INTERNAL MEDICINE

## 2022-08-18 PROCEDURE — 63600175 PHARM REV CODE 636 W HCPCS: Performed by: INTERNAL MEDICINE

## 2022-08-18 PROCEDURE — 97116 GAIT TRAINING THERAPY: CPT | Mod: CQ

## 2022-08-18 PROCEDURE — 25000003 PHARM REV CODE 250: Performed by: NURSE PRACTITIONER

## 2022-08-18 PROCEDURE — 94761 N-INVAS EAR/PLS OXIMETRY MLT: CPT

## 2022-08-18 PROCEDURE — 83735 ASSAY OF MAGNESIUM: CPT | Performed by: INTERNAL MEDICINE

## 2022-08-18 PROCEDURE — 21400001 HC TELEMETRY ROOM

## 2022-08-18 RX ADMIN — Medication 1 PACKET: at 04:08

## 2022-08-18 RX ADMIN — Medication 400 MG: at 08:08

## 2022-08-18 RX ADMIN — Medication 1 PACKET: at 10:08

## 2022-08-18 RX ADMIN — FUROSEMIDE 20 MG: 10 INJECTION, SOLUTION INTRAMUSCULAR; INTRAVENOUS at 04:08

## 2022-08-18 RX ADMIN — ENOXAPARIN SODIUM 80 MG: 80 INJECTION SUBCUTANEOUS at 04:08

## 2022-08-18 RX ADMIN — Medication 1 PACKET: at 08:08

## 2022-08-18 RX ADMIN — FAMOTIDINE 20 MG: 20 TABLET, FILM COATED ORAL at 08:08

## 2022-08-18 RX ADMIN — Medication 1 PACKET: at 12:08

## 2022-08-18 RX ADMIN — ASPIRIN 81 MG CHEWABLE TABLET 81 MG: 81 TABLET CHEWABLE at 08:08

## 2022-08-18 RX ADMIN — ATORVASTATIN CALCIUM 40 MG: 40 TABLET, FILM COATED ORAL at 10:08

## 2022-08-18 RX ADMIN — Medication 400 MG: at 04:08

## 2022-08-18 RX ADMIN — Medication 400 MG: at 10:08

## 2022-08-18 RX ADMIN — FAMOTIDINE 20 MG: 20 TABLET, FILM COATED ORAL at 10:08

## 2022-08-18 NOTE — PROGRESS NOTES
"[facility]  HOSPITAL MEDICINE PROGRESS NOTE      Patient Name: Harpreet Rodas  MRN: [id number]  Admission Date: 8/13/2022 12:32 PM  Status: IP- Inpatient   Length of Stay: 5 days  Face-to-Face encounter date: 08/18/2022       CHIEF COMPLAINT  Follow-up on respiratory failure and congestive heart failure      HOSPITAL COURSE  Patient is a 56-year-old white male with a past medical history of chronic obstructive pulmonary disease. The patient presented to [facility] on 8/13/2022 with a primary complaint of dyspnea on exertion, edema and chest pain.      Patient reports that for about 6 months he has had worsening dyspnea on exertion. Currently, just walking in his living room make him extremely short of breath. He started having swelling of his right arm and both legs 3 days ago. He started having diarrhea at the same time. He has lost weight. He says he gets hungry but feels full after a few bites and the food goes right through him. He came to the ER he began having left side chest pain, "a sticking sensation". He denies associated symptoms and it only lasts a short time. No radiation. No known history of CAD. Denies fever, chills or sweats. He quit smoking and drinking about 2 months ago after his wife had pneumonia and his breathing was worsening. He was drinking 4-5 40oz beers a week prior to quitting. The pt became very short of breath when trying to get into wheelchair for chest CT. However, he was able to tolerate CT and lay flat when transported by bed. He was given Lasix in the ED. He says he is feeling better. He is not on home oxygen. Only home med is an inhaler. Echocardiogram showed LVEF 15%, grade III left ventricular diastolic dysfunction, pulmonary hypertension, mild tricuspid regurgitation, moderate right atrial enlargement.    Interval history:  patient is sitting up in a chair.  He reports overall improvement and has no acute issues reported.  He states that all of his " problems started after he quit smoking and drinking.  He is afebrile, hemodynamically stable and on supplemental oxygen at 2 liters/minute via nasal cannula.    OBJECTIVE    VITAL SIGNS: 24 HRS MIN & MAX LAST   Temp  Min: 97.4 °F (36.3 °C)  Max: 98.2 °F (36.8 °C) 97.7 °F (36.5 °C)   BP  Min: 99/69  Max: 120/82 120/82   Pulse  Min: 64  Max: 115  92   Resp  Min: 16  Max: 22 18   SpO2  Min: 97 %  Max: 100 % 99 %       LABS/MICROBIOLOGY/MEDICATIONS/DIAGNOSTICS  I have reviewed all pertinent lab results within the past 24 hours.    Recent Labs   Lab 08/15/22  0329 08/16/22  0353 08/18/22  0419   WBC 8.0 7.4 8.1   RBC 4.39* 4.17* 4.02*   HGB 13.5* 13.0* 12.2*   HCT 44.2 40.5* 40.4*   .7* 97.1* 100.5*   MCH 30.8 31.2* 30.3   MCHC 30.5* 32.1* 30.2*   RDW 13.4 13.3 13.2   * 172 131   MPV 10.0 11.9* 10.3       Recent Labs   Lab 08/13/22  1247 08/13/22  1247 08/13/22  1255 08/13/22  1255 08/14/22  0658 08/15/22  0330 08/16/22  0353 08/16/22  0819 08/17/22  0537 08/17/22  1022 08/18/22  0419   NA  --   --  140   < > 139 141 140  --  139  --  138   K  --   --  6.1*   < > 5.2* 3.5 3.2*  --  3.8  --  3.8   CO2  --   --  28   < > 33* 37* 41*  --  43*  --  39*   BUN  --   --  45.0*   < > 43.2* 38.1* 26.2*  --  19.0  --  16.0   CREATININE  --   --  1.36*   < > 1.09 0.86 0.70*  --  0.71*  --  0.69*   CALCIUM  --   --  8.9   < > 8.6 8.0* 7.8*  --  7.7*  --  8.0*   PH 7.302 7.302*  --   --   --   --   --  7.39  --   --   --    MG  --   --  1.80   < > 1.50* 1.50* 1.50*  --  1.90  --  2.00   ALBUMIN  --   --  2.9*   < > 2.6* 2.6*  --   --   --  2.5*  --    ALKPHOS  --   --  85  --  69 86  --   --   --   --   --    ALT  --   --  23  --  21 23  --   --   --   --   --    AST  --   --  37*  --  25 25  --   --   --   --   --    BILITOT  --   --  1.2  --  0.7 0.5  --   --   --   --   --     < > = values in this interval not displayed.       Recent Labs     08/16/22  0353 08/18/22  0419   WBC 7.4 8.1   RBC 4.17* 4.02*   HGB 13.0*  12.2*   HCT 40.5* 40.4*   MCV 97.1* 100.5*   MCH 31.2* 30.3   MCHC 32.1* 30.2*   RDW 13.3 13.2     Recent Labs     08/17/22  0537 08/17/22  1022 08/18/22  0419   CHLORIDE 86*  --  89*   CO2 43*  --  39*   BUN 19.0  --  16.0   CREATININE 0.71*  --  0.69*   GLUCOSE 98  --  96   CALCIUM 7.7*  --  8.0*   ALBUMIN  --  2.5*  --        Microbiology Results (last 7 days)     Procedure Component Value Units Date/Time    Gram Stain [[id number]] Collected: 08/17/22 1052    Order Status: Completed Specimen: Body Fluid from Pleural Fluid Updated: 08/18/22 0745     GRAM STAIN No WBCs, No bacteria seen     Body Fluid Culture [[id number]] Collected: 08/17/22 1052    Order Status: Completed Specimen: Body Fluid from Thoracentesis Fluid Updated: 08/18/22 0722     Body Fluid Culture No Growth At 24 Hours    Blood Culture [[id number]]  (Normal) Collected: 08/13/22 1314    Order Status: Completed Specimen: Blood from Arm, Left Updated: 08/17/22 2101     CULTURE, BLOOD ([facility]) No Growth At 96 Hours    Blood Culture [[id number]]  (Normal) Collected: 08/13/22 1314    Order Status: Completed Specimen: Blood from Hand, Left Updated: 08/17/22 2101     CULTURE, BLOOD ([facility]) No Growth At 96 Hours    Gram Stain [[id number]] Collected: 08/17/22 1052    Order Status: Canceled Specimen: Thoracentesis Fluid Updated: 08/17/22 1242    Fungal Culture [[id number]] Collected: 08/17/22 1052    Order Status: Canceled Specimen: Thoracentesis Fluid Updated: 08/17/22 1135    Fungal Culture [[id number]] Collected: 08/17/22 1052    Order Status: Sent Specimen: Body Fluid from Pleural Fluid Updated: 08/17/22 1132    Mycobacteria and Nocardia Culture [[id number]] Collected: 08/17/22 1052    Order Status: Canceled Specimen: Body Fluid from Thoracentesis Fluid Updated: 08/17/22 1132    Mycobacteria and Nocardia Culture [[id number]] Collected: 08/17/22 1052    Order Status: Sent Specimen: Body Fluid from Thoracentesis Fluid Updated: 08/17/22 1131    Respiratory Culture  [[id number]]     Order Status: Sent Specimen: Sputum, Expectorated     Urine culture [[id number]] Collected: 08/13/22 1254    Order Status: Completed Specimen: Urine, Clean Catch Updated: 08/15/22 0958     Urine Culture No Growth    Clostridium Diff Toxin, A & B, EIA [[id number]] Collected: 08/13/22 2310    Order Status: Sent Specimen: Stool     Stool Culture **CANNOT BE ORDERED STAT** [[id number]]     Order Status: Sent Specimen: Stool            X-Ray Chest 1 View  Narrative: EXAMINATION:  XR CHEST 1 VIEW    CPT 55073    CLINICAL HISTORY:  Repeat CXR s/p prior w/ Small Apical Pneumothorax;    COMPARISON:  August 17, 2022    FINDINGS:  Cardiomediastinal silhouette improved parenchymal changes to be essentially unchanged as compared with previous exam persistent right-sided pneumothorax distance from apex to pleural reflection of approximately 3 cm there is a basilar component to the pneumothorax.    There is no other significant interval changes compared with the previous exam  Impression: Persistent right-sided pneumothorax with a basilar component hard to ascertain whether the basilar component might be related to an element of trapped lung.    No other significant change    Electronically signed by: Jefferson Blackman  Date:    08/17/2022  Time:    15:37  X-Ray Chest 1 View  Narrative: EXAMINATION:  XR CHEST 1 VIEW    CLINICAL HISTORY:  S/p right thoracentesis; right pleural effusion; .    COMPARISON:  Chest x-ray 08/13/2022    FINDINGS:  Frontal view of the chest was obtained.  The cardiac silhouette is enlarged, grossly stable.  The aorta is partially calcified.  There is prominence and indistinctness of the central vasculature and monie which may reflect pulmonary vascular congestion.  Small bilateral pleural effusions are seen.  The right pleural effusion appears to have decreased in size, compatible with thoracentesis.  There appears to be small right apical and lateral lower thoracic pneumothorax.  There are  increased interstitial opacities in bilateral lungs which could reflect interstitial edema.  Bilateral basilar predominant airspace opacities are seen with mild improved aeration in the right lung base, possibly related to atelectasis, but underlying infiltrate cannot be excluded.  Calcified pleural plaques in the lower left thorax are again noted.  Thoracic dextroscoliosis is seen.  Impression: 1. The right pleural effusion appears to have decreased in size, compatible with thoracentesis.  There appears to be small right apical and lateral lower thoracic pneumothorax.  The findings are reported to to the patient's unit nurse, Brenden, on 8/17/2022 at approximately 12:16.  A secure epic message regarding the findings was also sent to Dr. Parnell at the same time.    Electronically signed by: Brando Hill MD  Date:    08/17/2022  Time:    12:18        Scheduled Med:   aspirin  81 mg Oral Daily    atorvastatin  40 mg Oral QHS    enoxaparin  80 mg Subcutaneous Q12H    famotidine  20 mg Oral BID    furosemide (LASIX) injection  20 mg Intravenous Q12H    magnesium oxide  400 mg Oral TID    potassium, sodium phosphates  1 packet Oral QID ([facility])        Continuous Infusions:  None.    PRN Meds:  acetaminophen, albuterol-ipratropium, melatonin, promethazine, sodium chloride 0.9%, sodium chloride 0.9%       PHYSICAL EXAM  General:  Cachectic chronically ill-appearing white male who appears much older than his stated age, in no acute distress with generalized edema  HENT: normocephalic, atraumatic, poor dentition  Eye: PERRL, EOMI, clear conjunctiva  Neck: full ROM, no thyromegaly  Respiratory:  Diminished breath sounds with bibasilar crackles  Cardiovascular: regular rate and rhythm, bilateral upper and lower extremity edema  Gastrointestinal: non-distended, positive bowel sounds, non-tender  Genitourinary:  Scrotal edema  Musculoskeletal: no gross deformity  Integumentary: warm, dry, intact, no  christopher  Neurological: cranial nerves grossly intact, no focal neurological deficit  Psychiatric: cooperative      ASSESSMENT  Acute hypoxic respiratory failure  Acute combined systolic and diastolic congestive heart failure left ventricular ejection fraction of 15% and grade III left ventricular diastolic dysfunction  Left lower lobe acute pulmonary thromboembolism  Large right pleural effusion status post right thoracentesis 8/17/22 with 1030 mL straw colored fluid removed  Anasarca and ascites  Pulmonary hypertension  Probable hepatic cirrhosis with long history of alcohol abuse  COPD with emphysema with long history of tobacco abuse  Type 2 NSTEMI  Small right apical and lateral lower thoracic pneumothorax  Macrocytic anemia      PLAN  Continue supplemental oxygen, wean oxygen as tolerated, keep SaO2 greater than 88%  Assess need for home oxygen prior to discharge  Blood cultures and urine culture NGTD   Follow-up on pleural fluid studies  Continue diuresis with intravenous Lasix 20 mg q 12 hrs, replace electrolytes as needed  Continue ASA, statin.  CIS start GDMT when close to euvolemia. Will need Lifevest prior to discharge  Will need ischemic work up inpatient when euvolemic  Continue therapeutic dose Lovenox. Plan to switch to oral anticoagulation prior to discharge  Bilateral upper and lower extremity Doppler ultrasound negative for deep or superficial vein thrombosis   HIV, Hepatitis panel, Syphilis all negative    DVT Prophylaxis:  Full-dose Lovenox    Critical Care Diagnosis: Acute combined systolic congestive heart failure requiring IV diuretics; acute hypoxic respiratory failure requiring supplemental oxygen  Critical Care Interventions: Hands-on evaluation, review of labs, radiographs, medical records, and discussion with the patient and medical staff in order to assess and manage the high probability of imminent or life-threatening deterioration of cardio-respiratory status requiring vasopressor  support and/or intubation and mechanical ventilation.  Critical Care Time Spent: 35 minutes      Patient condition:  Guarded    Anticipated discharge and disposition: TBD  __________________________________________________________________________    All diagnosis and differential diagnosis have been reviewed; assessment and plan have been documented. I have personally reviewed the test results that are presently available; I have reviewed the patient's medication list. I have reviewed the consulting providers' recommendations. I have reviewed or attempted to review medical records based upon their availability.  All of the patient's questions have been addressed and answered. Patient is agreeable to the above stated plan. I will continue to monitor closely and make adjustment to medical management as needed.    This document was created with the assistance of a voice recognition software. There may be transcription errors as a result of using this technology, however minimal. Effort has been made to ensure accuracy of transcription but any obvious errors or omissions should be clarified with the author of this document.        Jose Manuel Natarajan MD   [facility] Medicine  08/18/2022

## 2022-08-18 NOTE — PT/OT/SLP PROGRESS
Occupational Therapy  Treatment    Harpreet Rodas   MRN: [id number]   Admitting Diagnosis: Anasarca    OT Date of Treatment: 08/18/22   OT Start Time: 1014  OT Stop Time: 1030  OT Total Time (min): 16 min     Billable Minutes:  Self Care/Home Management 16  Total Minutes: 16           ALY Visit Number: 1    General Precautions: Standard, fall  Orthopedic Precautions:    Braces:      Spiritual, Cultural Beliefs, Protestant Practices, Values that Affect Care: no    Subjective:  Pt UIC and agreeable to OT session.          Objective:  Patient found with: oxygen    Functional Mobility:  Bed Mobility:   Supine to sit: Activity did not occur   Sit to supine: Activity did not occur   Rolling: Activity did not occur   Scooting: Activity did not occur    Transfer Training:   sit>stand x5 trials min A initially for pull>stand. Instructed pt to place one hand on armrest to push to stand. Improvement noted with pt requiring CGA with rest breaks in between. O2 WNL. Instructed in proper execution of pursed lip breathing with O2.     LE Dressing:  LBD with AE. SPV for tasks.       Balance:   Static Sit: GOOD: Takes MODERATE challenges from all directions  Dynamic Sit:  GOOD: Maintains balance through MODERATE excursions of active trunk movement  Static Stand: GOOD-: Takes MODERATE challenges from all directions inconsistently  Dynamic stand: 0: N/A    Additional Treatment:      Patient left up in chair with all lines intact and call button in reach    ASSESSMENT:  Harpreet Rodas is a 56 y.o. male with a medical diagnosis of Anasarca.    Rehab potential is good    Activity tolerance: Good    Discharge recommendations: rehabilitation facility     Equipment recommendations:       GOALS:   Multidisciplinary Problems     Occupational Therapy Goals        Problem: Occupational Therapy    Goal Priority Disciplines Outcome Interventions   Occupational Therapy Goal     OT, PT/OT Ongoing, Progressing    Description: Goals to be met by:  8/29/22    Patient will increase functional independence with ADLs by performing:    LE Dressing with Stand-by Assistance.  Grooming while standing at sink with Stand-by Assistance.  Toileting from toilet with Stand-by Assistance for hygiene and clothing management.                      Plan:  Patient to be seen 3 x/week, 4 x/week, 5 x/week to address the above listed problems via self-care/home management, therapeutic activities, therapeutic exercises  Plan of Care expires: 08/29/22  Plan of Care reviewed with: patient         08/18/2022

## 2022-08-18 NOTE — PROGRESS NOTES
[facility] - 3rd Floor Medical Telemetry  Pulmonary Critical Care Note    Patient Name: Harpreet Rodas  MRN: [id number]  Admission Date: 8/13/2022  Hospital Length of Stay: 5 days  Code Status: Full Code  Attending Provider: Ana Rosa Fry MD  Primary Care Provider: Primary Doctor No     Subjective:     HPI:   This is a 56-year-old male who was admitted to [facility] on 08/13/2022 for complaints of worsening shortness of breath which has been ongoing over the last several months left-sided chest pain, and significant swelling of his right arm and bilateral feet.  Patient has history of drinking and smoking quit approximately 2 months ago prior to that was drinking 4-5 40 oz beer a weekly.  Workup revealed significantly elevated BNP of 3691.5, troponin 0.134, and CT imaging of the chest with a left lower lobe pulmonary thrombus, large right pleural effusion, and emphysematous changes throughout the lungs.  Pulmonary is being consulted for consideration of thoracentesis.  Patient was initiated on Lovenox b.i.d. secondary to pulmonary emboli. TTE obtained.     Further conversation revealed patient had been experiencing difficulty breathing approximately 6 months ago after inhalation of a combination of (murratic acid, sulfuric acid, bleach) while at work as a . Has been utilizing albuterol inhaler x2-3/day. Tried Trilegy x14 days without much relief. Approximately 3 months ago he began experiencing swelling in his B/L lower extremities in addition to early satiety.  He denied any associated weight loss stating he has always been a very thin person or fever/night sweats.  Denies any prior blood clots in legs/arm/lungs or any known cancers.  Mother and brother both passed away from unspecified cancer.      Patient has been exposed to a variety of pulmonary irritants/carcinogenic agents as listed below;  - Tobacco Use (approximately x70 pack years; quit x3 months ago)  - Asbestos  (Approximately 10-15 years)  - Sandblasting  - Glenn dust  - Harsh chemicals (Murratic Acid, Sulfuric Acid, Bleach; without appropriate respirator use)    24 Hour Interval History:  Per nurse reports; NIYA. He continues on 2L NC, IV lasix (20mg q12h), and FD Lovenox at this time. FD lovenox held x12 hours prior to Thoracentesis which was performed on () in which patient tolerated well w/ 1030 cc straw-colored fluid removal. CXR s/p thoracentesis w/ small right apical & lower right lateral pneumothoraces however etiology likely trapped lung and vitals remained wnl & patient in no acute distress. Patient continues to not sleep well but is feeling overall improved from a respiratory stand point. Currently pending pleural fluid analysis at this time.     Past Medical History:   Diagnosis Date    COPD (chronic obstructive pulmonary disease)        Past Surgical History:   Procedure Laterality Date    MANDIBLE FRACTURE SURGERY         Social History     Socioeconomic History    Marital status:    Tobacco Use    Smoking status: Former Smoker     Packs/day: 2.00     Quit date: 2022     Years since quittin.1    Smokeless tobacco: Never Used   Substance and Sexual Activity    Alcohol use: Not Currently     Comment: quit 2 months ago    Drug use: Never       Current Outpatient Medications   Medication Instructions    albuterol sulfate (INV ALBUTEROL) 90 mcg inhalation Inhalation, As needed (PRN), Take one puff by mouth as directed by Physician.       Current Inpatient Medications   aspirin  81 mg Oral Daily    atorvastatin  40 mg Oral QHS    enoxaparin  80 mg Subcutaneous Q12H    famotidine  20 mg Oral BID    furosemide (LASIX) injection  20 mg Intravenous Q12H    magnesium oxide  400 mg Oral TID    potassium, sodium phosphates  1 packet Oral QID (WM & HS)       ROS10 point ROS performed and negative other than stated above.      Objective:       Intake/Output Summary (Last 24 hours) at  8/18/2022 1512  Last data filed at 8/18/2022 1400  Gross per 24 hour   Intake 720 ml   Output 2575 ml   Net -1855 ml         Vital Signs (Most Recent):  Temp: 97.7 °F (36.5 °C) (08/18/22 1511)  Pulse: 92 (08/18/22 1511)  Resp: 18 (08/18/22 1511)  BP: 120/82 (08/18/22 1511)  SpO2: 99 % (08/18/22 1511)  Body mass index is 19.83 kg/m².  Weight: 64.5 kg (142 lb 3.2 oz) Vital Signs (24h Range):  Temp:  [97.4 °F (36.3 °C)-98.2 °F (36.8 °C)] 97.7 °F (36.5 °C)  Pulse:  [] 92  Resp:  [16-22] 18  SpO2:  [97 %-100 %] 99 %  BP: ()/(68-82) 120/82     Physical Exam  Constitutional:       General: He is not in acute distress.     Appearance: He is ill-appearing. He is not toxic-appearing.      Comments: Pleasant, cachetic gentleman   HENT:      Head: Normocephalic and atraumatic.      Mouth/Throat:      Mouth: Mucous membranes are moist.      Pharynx: Oropharynx is clear. No oropharyngeal exudate or posterior oropharyngeal erythema.   Eyes:      General: No scleral icterus.  Cardiovascular:      Rate and Rhythm: Normal rate and regular rhythm.      Heart sounds: Normal heart sounds.   Pulmonary:      Effort: Pulmonary effort is normal.      Breath sounds: Wheezing present.      Comments: Improved breath sounds throughout right lung fields on anterior auscultation s/p thoracentesis  Musculoskeletal:      Right lower leg: Edema present.      Left lower leg: Edema present.      Comments: 3+ edema to B/L LEs w/ dependent edema to T4-T5 B/L; Right arm with significant edema & tender to palpation though slightly improved.   Neurological:      General: No focal deficit present.      Mental Status: He is alert and oriented to person, place, and time.   Psychiatric:         Mood and Affect: Mood normal.         Behavior: Behavior normal.       Lines/Drains/Airways     Peripheral Intravenous Line  Duration                Peripheral IV - Single Lumen 08/14/22 2000 Left;Posterior Forearm 3 days                Significant  Labs:    Lab Results   Component Value Date    WBC 8.1 08/18/2022    HGB 12.2 (L) 08/18/2022    HCT 40.4 (L) 08/18/2022    .5 (H) 08/18/2022     08/18/2022   BNP of 3691.5, troponin 0.134      BMP  Lab Results   Component Value Date     08/18/2022    K 3.8 08/18/2022    CO2 39 (H) 08/18/2022    BUN 16.0 08/18/2022    CREATININE 0.69 (L) 08/18/2022    CALCIUM 8.0 (L) 08/18/2022   Magnesium 1.5, phosphorus 5.7    ABG  Recent Labs   Lab 08/13/22  1247 08/16/22  0819   PH 7.302* 7.39   PO2 94 64*   PCO2 59.6* 90*   HCO3 29.4* 54.5   BE 3  --        Significant Imaging:  X-Ray Chest 1 View  Narrative: EXAMINATION:  XR CHEST 1 VIEW    CPT 58191    CLINICAL HISTORY:  Repeat CXR s/p prior w/ Small Apical Pneumothorax;    COMPARISON:  August 17, 2022    FINDINGS:  Cardiomediastinal silhouette improved parenchymal changes to be essentially unchanged as compared with previous exam persistent right-sided pneumothorax distance from apex to pleural reflection of approximately 3 cm there is a basilar component to the pneumothorax.    There is no other significant interval changes compared with the previous exam  Impression: Persistent right-sided pneumothorax with a basilar component hard to ascertain whether the basilar component might be related to an element of trapped lung.    No other significant change    Electronically signed by: Jefferson Blackman  Date:    08/17/2022  Time:    15:37  X-Ray Chest 1 View  Narrative: EXAMINATION:  XR CHEST 1 VIEW    CLINICAL HISTORY:  S/p right thoracentesis; right pleural effusion; .    COMPARISON:  Chest x-ray 08/13/2022    FINDINGS:  Frontal view of the chest was obtained.  The cardiac silhouette is enlarged, grossly stable.  The aorta is partially calcified.  There is prominence and indistinctness of the central vasculature and monie which may reflect pulmonary vascular congestion.  Small bilateral pleural effusions are seen.  The right pleural effusion appears to have  decreased in size, compatible with thoracentesis.  There appears to be small right apical and lateral lower thoracic pneumothorax.  There are increased interstitial opacities in bilateral lungs which could reflect interstitial edema.  Bilateral basilar predominant airspace opacities are seen with mild improved aeration in the right lung base, possibly related to atelectasis, but underlying infiltrate cannot be excluded.  Calcified pleural plaques in the lower left thorax are again noted.  Thoracic dextroscoliosis is seen.  Impression: 1. The right pleural effusion appears to have decreased in size, compatible with thoracentesis.  There appears to be small right apical and lateral lower thoracic pneumothorax.  The findings are reported to to the patient's unit nurse, Brenden, on 8/17/2022 at approximately 12:16.  A secure epic message regarding the findings was also sent to Dr. Parnell at the same time.    Electronically signed by: Brando Hill MD  Date:    08/17/2022  Time:    12:18      Assessment/Plan:     Assessment  1. Left Lower Lobe Pulmonary Embolism  2. Large Right-sided pleural effusion w/ Atelectasis - improved s/p thoracentesis (8/19/22)  3. HFrEF (EF of 15%, 8/2022) w/ elevated BNP  4. Compensated Respiratory Acidosis w/ contraction alkalosis  5. RUE Edema  6. Hx of COPD (unquantified)  7. History of tobacco and alcohol abuse  8. Hx of Multiple Pulmonary Irritants as listed in above HPI  9. Cachectic w/ x3 months early satiety  10. Complaints of diarrhea on admit C diff pending - diarrhea resolved    Plan  - Continue O2 supplementation (currently 2L NC); wean as tolerated to maintain O2 sats >90%  - Thoracentesis performed (8/17/22); patient tolerated well   -- 1030 cc straw colored fluid removed   -- Pending pleural fluid studies for further diagnostics   -- Post-procedural CXR (x2) w/ right-sided trapped lung  - Continue FD Lovenox in setting of left pulmonary embolism  - HIV/Hepatitis Panel/Syphillis  (all resulted negative)  - B/L LE & RUE U/S NIVA (negative)  - TTE w/ EF of 15% w/ diastolic dysfunction   -- Would continue diuresis as tolerated; currently Lasix 20mg Q12h   -- Strict I's & O's (- 14.7L since admission)  - Further Recommendations per Pulmonologist    Polo Parnell MD   Internal Medicine PGY-II  Pulmonary Service    Attending Addendum to Follow

## 2022-08-18 NOTE — PLAN OF CARE
Order noted to case management for LifeVest- referral sent Epic to Paul.  Communication to Jeanine cell # [phone].  Seems pt is a self pay .    Contacted in house rep with Medicaid and she states she met with pt but did not start a Medicaid application because pt states he has insurance and is bringing proof of insurance today for her to check.  Asked if she will let CM know by calling #[phone] and Breanne can give info to CM covering tomorrow in order for CM to provide ins info to Jeanine/Paul cell # [phone]  for anticipated LifeVest needed upon d/c.      Communication sent to Jeanine re: possibility of insurance therefore no Medicaid giles. Started thus far, but pt was going to provide today.

## 2022-08-18 NOTE — PT/OT/SLP PROGRESS
Physical Therapy Treatment    Patient Name:  Harpreet Rodas   MRN:  [id number]    Recommendations:     Discharge Recommendations:  rehabilitation facility   Discharge Equipment Recommendations: rollator, shower chair   Barriers to discharge: medical condition    Assessment:     Harpreet Rodas is a 56 y.o. male admitted with a medical diagnosis of Anasarca.  He presents with the following impairments/functional limitations:  weakness, impaired endurance .    Rehab Prognosis: Good; patient would benefit from acute skilled PT services to address these deficits and reach maximum level of function.    Recent Surgery: * No surgery found *      Plan:     During this hospitalization, patient to be seen 6 x/week to address the identified rehab impairments via therapeutic activities, therapeutic exercises, gait training and progress toward the following goals:    · Plan of Care Expires:  09/15/22    Subjective     Chief Complaint: fatigue  Patient/Family Comments/goals:   Pain/Comfort:  ·        Objective:     Communicated with RN prior to session.  Patient found up in chair with   upon PT entry to room.     General Precautions: Standard, fall   Orthopedic Precautions:    Braces:    Respiratory Status: Nasal cannula, flow 3 L/min   02 with activity: 79%  O2 with rest: 92%   HR: 99     Functional Mobility:  · Transfers:     · Sit to Stand:  stand by assistance with rolling walker  · Gait: Pt amb 20ftx1, 10ftx2 with RW. Cuing for posture and to increase FABRICE foot clearance. Pt easily fatigue. seated rest break required.          AM-PAC 6 CLICK MOBILITY          Therapeutic Activities and Exercises:   Pt performed LAQ 5x1    Patient left up in chair with all lines intact and call button in reach..    GOALS:   Multidisciplinary Problems     Physical Therapy Goals        Problem: Physical Therapy    Goal Priority Disciplines Outcome Goal Variances Interventions   Physical Therapy Goal     PT, PT/OT Ongoing, Progressing      Description: Goals to be met by: 9/15/22     Patient will increase functional independence with mobility by performin. Supine to sit with Stand-by Assistance  2. Sit to stand transfer with Stand-by Assistance  3. Gait  x 150 feet with Stand-by Assistance using Rolling Walker.   4. Ascend/descend 12 stair with right Handrails Stand-by Assistance using No Assistive Device.                      Time Tracking:     PT Received On: 22  PT Start Time: 1055     PT Stop Time: 1124  PT Total Time (min): 29 min     Billable Minutes: Gait Training 19 and Therapeutic Activity 10    Treatment Type: Treatment  PT/PTA: PTA     PTA Visit Number: 3     2022

## 2022-08-18 NOTE — PROGRESS NOTES
[facility] - 3rd Floor Medical Telemetry  Cardiology  Progress Note    Patient Name: Harpreet Rodas  MRN: [id number]  Admission Date: 8/13/2022  Hospital Length of Stay: 5 days  Code Status: Full Code   Attending Provider: Ana Rosa Fry MD   Consulting Provider: ART De Oliveira  Primary Care Physician: Primary Doctor No  Principal Problem:Anasarca    Patient information was obtained from patient and ER records.     Subjective:     Chief Complaint:       HPI:   Mr. Mayen is a 57y/o male, unknown to [facility], with PMHx significant for COPD and previous heavy smoker/drinker who presented to [facility] via EMS for c/o SOB, MON, orthopnea, peripheral edema, diarrhea, and chest pain described as sticking sensation. No radiation or associated symptoms.  RA sat upon EMS arrival-88% and he was placed on Bipap. In ED, workup significant for BUN/creatinine 45/1.36, K 6.1, D-dimer 3.05, BNP 3280, Troponin 0.134-0.120-0.105. CXR revealing bilateral pleural effusions. Right> left with possible RLL opacification. CTA chest obtained LLL PE, large right pleural effusion, and findings of anasarca. EF revealing EF 15%, Grade III LVDD and RA/RV enlargement. He was placed on diuretics with consult placed to [facility] for PVC and 2nd degree Type II AVB. Review of tele reveals SR with non-conducted PACs. No 2nd degree heart block noted.     Hospital Course:  8/16/22: Patient awake in bed. Good diuresis with Lasix. Still volume overloaded.   8/17/22: Patient awake in bed. Continues to require O2 support. S/p right sided thoracentesis with reported removal of 1030 L straw colored fluid. CO2 rising. Patient started on diamox. Renal indices stable. -5.5L UOP x 24hr  8/18/22: Patient sitting up in bedside chair. Reports SOB and peripheral edema improving.       PMH: COPD  PSH: mandible fx surgery,  Family History: Mother- cancer; brother- cancer  Social History: previous heavy smoker (2ppd)/drinker- quit 6/22    Previous Cardiac  Diagnostics:   Venous US BUE 08/14/22:  No evidence of deep or superficial vein thrombosis in bilateral upper extremities.     Venous US BLE 08/14/22:  Negative DVT to BLE    TTE 08/13/22:  Severely decreased Systolic function. Estimated EF 15%. Grade III LVDD. There is pulmonary HTN, Mild TR. Moderate RV enlargement. Moderate RA enlargement. Intermediate CVP 8 mmHg. Estimated PASP 50mmHg    Review of Systems   Constitutional: Positive for activity change and fatigue.   Respiratory: Positive for shortness of breath.    Cardiovascular: Positive for leg swelling. Negative for chest pain.   Gastrointestinal: Positive for abdominal distention.   Genitourinary: Positive for scrotal swelling.   Neurological: Negative.    Psychiatric/Behavioral: Negative.        Objective:     Vital Signs (Most Recent):  Temp: 97.5 °F (36.4 °C) (08/18/22 0432)  Pulse: 89 (08/18/22 0432)  Resp: 18 (08/18/22 0432)  BP: 111/68 (08/18/22 0432)  SpO2: 99 % (08/18/22 0432) Vital Signs (24h Range):  Temp:  [97.4 °F (36.3 °C)-98.2 °F (36.8 °C)] 97.5 °F (36.4 °C)  Pulse:  [] 89  Resp:  [16-22] 18  SpO2:  [97 %-99 %] 99 %  BP: ()/(63-85) 111/68     Weight: 64.5 kg (142 lb 3.2 oz)  Body mass index is 19.83 kg/m².    SpO2: 99 %  O2 Device (Oxygen Therapy): nasal cannula w/ humidification      Intake/Output Summary (Last 24 hours) at 8/18/2022 0609  Last data filed at 8/18/2022 0400  Gross per 24 hour   Intake 1140 ml   Output 3250 ml   Net -2110 ml       Lines/Drains/Airways     Peripheral Intravenous Line  Duration                Peripheral IV - Single Lumen 08/14/22 2000 Left;Posterior Forearm 3 days                Significant Labs:  Recent Results (from the past 72 hour(s))   Lipid Panel    Collection Time: 08/16/22  3:53 AM   Result Value Ref Range    Cholesterol Total 104 <=200 mg/dL    HDL Cholesterol 33 (L) 35 - 60 mg/dL    Triglyceride 71 34 - 140 mg/dL    Cholesterol/HDL Ratio 3 0 - 5    Very Low Density Lipoprotein 14     LDL  Cholesterol 57.00 50.00 - 140.00 mg/dL   Basic Metabolic Panel    Collection Time: 08/16/22  3:53 AM   Result Value Ref Range    Sodium Level 140 136 - 145 mmol/L    Potassium Level 3.2 (L) 3.5 - 5.1 mmol/L    Chloride 88 (L) 98 - 107 mmol/L    Carbon Dioxide 41 (HH) 22 - 29 mmol/L    Glucose Level 70 (L) 74 - 100 mg/dL    Blood Urea Nitrogen 26.2 (H) 8.4 - 25.7 mg/dL    Creatinine 0.70 (L) 0.73 - 1.18 mg/dL    BUN/Creatinine Ratio 37     Calcium Level Total 7.8 (L) 8.4 - 10.2 mg/dL    Anion Gap 11.0 mEq/L    eGFR >60 mls/min/1.73/m2   Magnesium    Collection Time: 08/16/22  3:53 AM   Result Value Ref Range    Magnesium Level 1.50 (L) 1.60 - 2.60 mg/dL   CBC with Differential    Collection Time: 08/16/22  3:53 AM   Result Value Ref Range    WBC 7.4 4.5 - 11.5 x10(3)/mcL    RBC 4.17 (L) 4.70 - 6.10 x10(6)/mcL    Hgb 13.0 (L) 14.0 - 18.0 gm/dL    Hct 40.5 (L) 42.0 - 52.0 %    MCV 97.1 (H) 80.0 - 94.0 fL    MCH 31.2 (H) 27.0 - 31.0 pg    MCHC 32.1 (L) 33.0 - 36.0 mg/dL    RDW 13.3 11.5 - 17.0 %    Platelet 172 130 - 400 x10(3)/mcL    MPV 11.9 (H) 7.4 - 10.4 fL    Neut % 70.4 %    Lymph % 18.4 %    Mono % 8.8 %    Eos % 1.6 %    Basophil % 0.4 %    Lymph # 1.36 0.6 - 4.6 x10(3)/mcL    Neut # 5.2 2.1 - 9.2 x10(3)/mcL    Mono # 0.65 0.1 - 1.3 x10(3)/mcL    Eos # 0.12 0 - 0.9 x10(3)/mcL    Baso # 0.03 0 - 0.2 x10(3)/mcL    IG# 0.03 0 - 0.04 x10(3)/mcL    IG% 0.4 %    NRBC% 0.0 %   HIV 1/2 Ag/Ab (4th Gen)    Collection Time: 08/16/22  3:53 AM   Result Value Ref Range    HIV Nonreactive Nonreactive   Hepatitis Panel, Acute    Collection Time: 08/16/22  3:53 AM   Result Value Ref Range    Hepatitis A IgM Nonreactive Nonreactive    Hepatitis B Core IgM Nonreactive Nonreactive    Hepatitis B Surface Antigen Nonreactive Nonreactive    Hepatitis C Antibody Nonreactive Nonreactive   SYPHILIS ANTIBODY (WITH REFLEX RPR)    Collection Time: 08/16/22  3:53 AM   Result Value Ref Range    Syphilis Antibody Nonreactive Nonreactive,  Equivocal   POCT ARTERIAL BLOOD GAS    Collection Time: 08/16/22  8:19 AM   Result Value Ref Range    POC PH 7.39 7.35 - 7.45    POC PCO2 90 (AA) 19 - 50 mmHg    POC PO2 64 (A) 80 - 100 mmHg    POC HEMOGLOBIN 13.3 12.0 - 16.0 g/dL    POC SATURATED O2 91.9 %    POC O2Hb 91.7 (A) 94.0 - 97.0 %    POC COHb 2.3 %    POC MetHb 1.2 0.40 - 1.5 %    POC Potassium 2.7 (A) 3.5 - 5.0 mmol/l    POC Sodium 132 (A) 137 - 145 mmol/l    POC Ionized Calcium 1.01 (A) 1.12 - 1.23 mmol/l    Correct Temperature (PH) 7.39 7.35 - 7.45    Corrected Temperature (pCO2) 90 (AA) 19 - 50 mmHg    Corrected Temperature (pO2) 64 (A) 80 - 100 mmHg    POC HCO3 54.5 mmol/l    Base Deficit 24.0 (A) -2.00 - 2.00 mmol/l    POC Temp 37.0 °C    Specimen source Arterial sample    Basic Metabolic Panel    Collection Time: 08/17/22  5:37 AM   Result Value Ref Range    Sodium Level 139 136 - 145 mmol/L    Potassium Level 3.8 3.5 - 5.1 mmol/L    Chloride 86 (L) 98 - 107 mmol/L    Carbon Dioxide 43 (HH) 22 - 29 mmol/L    Glucose Level 98 74 - 100 mg/dL    Blood Urea Nitrogen 19.0 8.4 - 25.7 mg/dL    Creatinine 0.71 (L) 0.73 - 1.18 mg/dL    BUN/Creatinine Ratio 27     Calcium Level Total 7.7 (L) 8.4 - 10.2 mg/dL    Anion Gap 10.0 mEq/L    eGFR >60 mls/min/1.73/m2   Magnesium    Collection Time: 08/17/22  5:37 AM   Result Value Ref Range    Magnesium Level 1.90 1.60 - 2.60 mg/dL   Phosphorus    Collection Time: 08/17/22  5:37 AM   Result Value Ref Range    Phosphorus Level 1.7 (L) 2.3 - 4.7 mg/dL   Albumin    Collection Time: 08/17/22 10:22 AM   Result Value Ref Range    Albumin Level 2.5 (L) 3.5 - 5.0 gm/dL   Lactate Dehydrogenase    Collection Time: 08/17/22 10:22 AM   Result Value Ref Range    Lactate Dehydrogenase 267 (H) 125 - 220 U/L   Cell Count, Body Fluid    Collection Time: 08/17/22 10:52 AM   Result Value Ref Range    WBC  /uL   Differential, Body Fluid    Collection Time: 08/17/22 10:52 AM   Result Value Ref Range    Neutrophils % 22 %     Lymphocyte % 76 %    Monocyte % 2 %    Mesothelial count 3    Glucose, Random    Collection Time: 08/17/22 10:00 PM   Result Value Ref Range    CBG 98    Glucose, Random    Collection Time: 08/18/22 12:00 AM   Result Value Ref Range    CBG 86    Basic Metabolic Panel    Collection Time: 08/18/22  4:19 AM   Result Value Ref Range    Sodium Level 138 136 - 145 mmol/L    Potassium Level 3.8 3.5 - 5.1 mmol/L    Chloride 89 (L) 98 - 107 mmol/L    Carbon Dioxide 39 (H) 22 - 29 mmol/L    Glucose Level 96 74 - 100 mg/dL    Blood Urea Nitrogen 16.0 8.4 - 25.7 mg/dL    Creatinine 0.69 (L) 0.73 - 1.18 mg/dL    BUN/Creatinine Ratio 23     Calcium Level Total 8.0 (L) 8.4 - 10.2 mg/dL    Anion Gap 10.0 mEq/L    eGFR >60 mls/min/1.73/m2   Phosphorus    Collection Time: 08/18/22  4:19 AM   Result Value Ref Range    Phosphorus Level 2.7 2.3 - 4.7 mg/dL   Magnesium    Collection Time: 08/18/22  4:19 AM   Result Value Ref Range    Magnesium Level 2.00 1.60 - 2.60 mg/dL   CBC with Differential    Collection Time: 08/18/22  4:19 AM   Result Value Ref Range    WBC 8.1 4.5 - 11.5 x10(3)/mcL    RBC 4.02 (L) 4.70 - 6.10 x10(6)/mcL    Hgb 12.2 (L) 14.0 - 18.0 gm/dL    Hct 40.4 (L) 42.0 - 52.0 %    .5 (H) 80.0 - 94.0 fL    MCH 30.3 27.0 - 31.0 pg    MCHC 30.2 (L) 33.0 - 36.0 mg/dL    RDW 13.2 11.5 - 17.0 %    Platelet 131 130 - 400 x10(3)/mcL    MPV 10.3 7.4 - 10.4 fL    Neut % 71.0 %    Lymph % 16.7 %    Mono % 10.1 %    Eos % 1.4 %    Basophil % 0.4 %    Lymph # 1.36 0.6 - 4.6 x10(3)/mcL    Neut # 5.8 2.1 - 9.2 x10(3)/mcL    Mono # 0.82 0.1 - 1.3 x10(3)/mcL    Eos # 0.11 0 - 0.9 x10(3)/mcL    Baso # 0.03 0 - 0.2 x10(3)/mcL    IG# 0.03 0 - 0.04 x10(3)/mcL    IG% 0.4 %    NRBC% 0.0 %   POCT glucose    Collection Time: 08/18/22  4:29 AM   Result Value Ref Range    POCT Glucose 81 70 - 110 mg/dL       Significant Imaging:  Imaging Results          CTA Chest Abdomen Non Coronary (Final result)  Result time 08/13/22 15:55:52   Procedure  changed from CTA Chest Non-Coronary (PE Study)     Final result by Basilio De Oliveira MD (08/13/22 15:55:52)                 Impression:      Left lower lobe pulmonary thromboembolism is noted.    Large right pleural effusion is present.    Findings of anasarca.    The bladder wall is prominent.  Correlate with urinalysis.    There are cortical defects of the bilateral kidneys likely related to chronic renal disease and scarring, however infarcts are less likely.    The liver appears heterogeneous which may be related to phase of contrast however is cirrhosis is not excluded.  Cardiomegaly is present.  Further evaluation may be obtained with ECHO.    Findings reported to Dr. Little prior to interpretation.      Electronically signed by: Basilio De Oliveira  Date:    08/13/2022  Time:    15:55             Narrative:    EXAMINATION:  CTA CHEST ABDOMEN NON CORONARY (XPD)    CLINICAL HISTORY:  Pulmonary embolism (PE) suspected, positive D-dimer;    TECHNIQUE:  Axial CTA images of the chest, abdomen, and pelvis were obtained With Contrast. Sagittal and coronal reconstructed images were available for review.    Automatic exposure control was utilized to reduce the patient's radiation dose.    DLP = 582    COMPARISON:  No prior images available for comparison.    FINDINGS:  PULMONARY ARTERY: Thrombus is identified in the left lower lobe pulmonary artery.    AORTA: The thoracoabdominal aorta is normal in course and caliber. Scattered atherosclerotic disease is noted.    HEART: The heart is enlarged.  No pericardial effusion.    THYROID GLAND: The thyroid is not enlarged. There are no nodules identified.    AIRWAYS: Trachea is midline and tracheobronchial tree is patent.    LUNGS: Large right effusion with subsegmental atelectatic changes at the right base.  Emphysematous changes throughout the lungs.    THROACIC LYMPH NODES: There is no significant mediastinal, axillary or hilar lymphadenopathy.    HEPATOBILIARY: Somewhat nodular  contour of the superior aspect of the liver with some heterogeneity may be related to phase of contrast versus cirrhosis.  Correlate with patient's history.  The gallbladder is normal.    SPLEEN: Normal    PANCREAS: No focal masses or ductal dilatation.    ADRENALS: No adrenal nodules.    KIDNEYS: No evidence of hydronephrosis.  Bilateral renal cortical perfusional defects likely related to scarring in chronic kidney disease.  Correlate with patient's history.  Less likely infarcts.    ABDOMINAL LYMPHADENOPATHY/RETROPERITONEUM: There is no retroperitoneal lymphadenopathy.    BOWEL: No acute bowel related abnormalities.    PELVIC VISCERA: The bladder wall is somewhat prominent.  Correlate with urinalysis.    PELVIC LYMPH NODES: No lymphadenopathy.    PERITONEUM/ BODY WALL: Diffuse body wall edema with moderate ascites noted.    SKELETAL: No aggressive appearing lytic/blastic lesion. No acute fractures, subluxations or dislocations.                               X-Ray Chest 1 View (Final result)  Result time 08/13/22 12:44:56    Final result by Basilio De Oliveira MD (08/13/22 12:44:56)                 Impression:      Right greater than left pleural effusion with possible right lower lobe opacification.  Underlying infectious process is not excluded.  Recommend continued follow-up.      Electronically signed by: Basilio De Oliveira  Date:    08/13/2022  Time:    12:44             Narrative:    EXAMINATION:  XR CHEST 1 VIEW    CLINICAL HISTORY:  shortness of breath;    TECHNIQUE:  Single view of the chest    COMPARISON:  No prior imaging available for comparison.    FINDINGS:  Right greater than left pleural effusion with possible right lower lobe opacification.  Underlying infectious process is not excluded.  Recommend continued follow-up.                                EKG:      Telemetry:        Physical Exam  HENT:      Mouth/Throat:      Mouth: Mucous membranes are moist.      Comments: Poor dentition  Cardiovascular:       Rate and Rhythm: Normal rate and regular rhythm.      Heart sounds: Normal heart sounds.      Comments: anasarca  Pulmonary:      Breath sounds: Rales present.      Comments: Conversational dyspnea  Abdominal:      Palpations: Abdomen is soft.   Musculoskeletal:      Right lower leg: Edema present.      Left lower leg: Edema present.   Skin:     General: Skin is warm.   Neurological:      General: No focal deficit present.      Mental Status: He is alert.   Psychiatric:         Mood and Affect: Mood normal.         Home Medications:   No current facility-administered medications on file prior to encounter.     Current Outpatient Medications on File Prior to Encounter   Medication Sig Dispense Refill    albuterol sulfate (INV ALBUTEROL) 90 mcg inhalation Inhale into the lungs as needed. Take one puff by mouth as directed by Physician.         Current Inpatient Medications:    Current Facility-Administered Medications:     acetaminophen tablet 650 mg, 650 mg, Oral, Q4H PRN, Reginald Barker MD, 650 mg at 08/16/22 2222    albuterol-ipratropium 2.5 mg-0.5 mg/3 mL nebulizer solution 3 mL, 3 mL, Nebulization, Q4H PRN, Reginald Barker MD    aspirin chewable tablet 81 mg, 81 mg, Oral, Daily, Jaylin Grissom, FNP, 81 mg at 08/17/22 1030    atorvastatin tablet 40 mg, 40 mg, Oral, QHS, Kwasi Thao MD, 40 mg at 08/17/22 1941    enoxaparin injection 80 mg, 80 mg, Subcutaneous, Q12H, Kwasi hTao MD, 80 mg at 08/18/22 0425    famotidine tablet 20 mg, 20 mg, Oral, BID, Reginald Barker MD, 20 mg at 08/17/22 1941    furosemide injection 20 mg, 20 mg, Intravenous, Q12H, Kwasi Thao MD, 20 mg at 08/18/22 0425    magnesium oxide tablet 400 mg, 400 mg, Oral, TID, Kwasi Thao MD, 400 mg at 08/17/22 2122    melatonin tablet 6 mg, 6 mg, Oral, Nightly PRN, Reginald Barker MD    potassium, sodium phosphates 280-160-250 mg packet 1 packet, 1 packet, Oral, QID (WM & HS), Kwasi Thao MD, 1 packet at 08/17/22 1940     promethazine tablet 25 mg, 25 mg, Oral, Q6H PRN, Reginald Barker MD    sodium chloride 0.9% flush 10 mL, 10 mL, Intravenous, PRN, Yuni Little MD    sodium chloride 0.9% flush 10 mL, 10 mL, Intravenous, PRN, Reginald Barker MD         VTE Risk Mitigation (From admission, onward)         Ordered     enoxaparin injection 80 mg  Every 12 hours (non-standard times)         08/15/22 1314     IP VTE HIGH RISK PATIENT  Once         08/13/22 1523     Place sequential compression device  Until discontinued         08/13/22 1523     Place GIACOMO hose  Until discontinued         08/13/22 1523                Assessment:   CMO, unspecified  --EF 15%  Acute combined systolic and diastolic HF  --EF 15%; Grade III LVDD  --BNP 3280  --s/p right thoracentesis with 1030 fluid removal' f/u xray revealing small apical pneumothorax ? Element of trapped lung  NSTEMI, Type II s/t heart failure  --Troponin 0.134->0.120->0.105  Left BBB  LLL PE  ANNETTA  --improving with diuresis  COPD  Former smoker  No hx GIB    Plan:   Excellent UOP. Still overloaded. Continue diuresis. Ensure accurate I&O's/daily weights  Will start GDMT once patient is closer to euvolemia  Troponin elevation with flat trend likely s/t CMO. Will plan for coronary evaluation once euvolemic pending stable renal function.   Continue aspirin, atorvastatin, and weight based lovenox bid.  Lifevest will be needed prior to DC- order placed to case management  No indication for mechanical thrombectomy as patient is hemodynamically stable. Continue weight based Lovenox with plans to transition to DOAC prior to DC.         Jaylin Grissom, FNP  Cardiology  [facility] - 3rd Floor Medical Telemetry  08/18/2022

## 2022-08-19 ENCOUNTER — HOSPITAL ENCOUNTER (OUTPATIENT)
Dept: RADIOLOGY | Facility: HOSPITAL | Age: 57
Discharge: HOME | End: 2022-08-19

## 2022-08-19 LAB
ALBUMIN SERPL-MCNC: 2.8 GM/DL (ref 3.5–5)
ALBUMIN/GLOB SERPL: 1 RATIO (ref 1.1–2)
ALP SERPL-CCNC: 93 UNIT/L (ref 40–150)
ALT SERPL-CCNC: 33 UNIT/L (ref 0–55)
AST SERPL-CCNC: 45 UNIT/L (ref 5–34)
BASOPHILS # BLD AUTO: 0.03 X10(3)/MCL (ref 0–0.2)
BASOPHILS NFR BLD AUTO: 0.3 %
BILIRUBIN DIRECT+TOT PNL SERPL-MCNC: 1 MG/DL
BUN SERPL-MCNC: 14.2 MG/DL (ref 8.4–25.7)
CALCIUM SERPL-MCNC: 8.3 MG/DL (ref 8.4–10.2)
CHLORIDE SERPL-SCNC: 89 MMOL/L (ref 98–107)
CO2 SERPL-SCNC: 43 MMOL/L (ref 22–29)
CREAT SERPL-MCNC: 0.64 MG/DL (ref 0.73–1.18)
EOSINOPHIL # BLD AUTO: 0.11 X10(3)/MCL (ref 0–0.9)
EOSINOPHIL NFR BLD AUTO: 1.2 %
ERYTHROCYTE [DISTWIDTH] IN BLOOD BY AUTOMATED COUNT: 13.4 % (ref 11.5–17)
GFR SERPLBLD CREATININE-BSD FMLA CKD-EPI: >60 MLS/MIN/1.73/M2
GLOBULIN SER-MCNC: 2.9 GM/DL (ref 2.4–3.5)
GLUCOSE SERPL-MCNC: 83 MG/DL (ref 74–100)
HCT VFR BLD AUTO: 39.8 % (ref 42–52)
HGB BLD-MCNC: 11.9 GM/DL (ref 14–18)
IMM GRANULOCYTES # BLD AUTO: 0.03 X10(3)/MCL (ref 0–0.04)
IMM GRANULOCYTES NFR BLD AUTO: 0.3 %
LYMPHOCYTES # BLD AUTO: 1.38 X10(3)/MCL (ref 0.6–4.6)
LYMPHOCYTES NFR BLD AUTO: 15.2 %
MCH RBC QN AUTO: 30.4 PG (ref 27–31)
MCHC RBC AUTO-ENTMCNC: 29.9 MG/DL (ref 33–36)
MCV RBC AUTO: 101.5 FL (ref 80–94)
MONOCYTES # BLD AUTO: 0.92 X10(3)/MCL (ref 0.1–1.3)
MONOCYTES NFR BLD AUTO: 10.2 %
NEUTROPHILS # BLD AUTO: 6.6 X10(3)/MCL (ref 2.1–9.2)
NEUTROPHILS NFR BLD AUTO: 72.8 %
NRBC BLD AUTO-RTO: 0 %
PLATELET # BLD AUTO: 142 X10(3)/MCL (ref 130–400)
PMV BLD AUTO: 9.8 FL (ref 7.4–10.4)
POTASSIUM SERPL-SCNC: 3.6 MMOL/L (ref 3.5–5.1)
PROT SERPL-MCNC: 5.7 GM/DL (ref 6.4–8.3)
RBC # BLD AUTO: 3.92 X10(6)/MCL (ref 4.7–6.1)
SODIUM SERPL-SCNC: 139 MMOL/L (ref 136–145)
WBC # SPEC AUTO: 9.1 X10(3)/MCL (ref 4.5–11.5)

## 2022-08-19 PROCEDURE — 85025 COMPLETE CBC W/AUTO DIFF WBC: CPT | Performed by: NURSE PRACTITIONER

## 2022-08-19 PROCEDURE — 99223 1ST HOSP IP/OBS HIGH 75: CPT | Mod: ,,, | Performed by: INTERNAL MEDICINE

## 2022-08-19 PROCEDURE — 21400001 HC TELEMETRY ROOM

## 2022-08-19 PROCEDURE — 25500020 PHARM REV CODE 255: Performed by: SURGERY

## 2022-08-19 PROCEDURE — 74178 CT ABD&PLV WO CNTR FLWD CNTR: CPT | Mod: TC

## 2022-08-19 PROCEDURE — 25000003 PHARM REV CODE 250: Performed by: INTERNAL MEDICINE

## 2022-08-19 PROCEDURE — 36415 COLL VENOUS BLD VENIPUNCTURE: CPT | Performed by: NURSE PRACTITIONER

## 2022-08-19 PROCEDURE — 25000003 PHARM REV CODE 250: Performed by: NURSE PRACTITIONER

## 2022-08-19 PROCEDURE — 63600175 PHARM REV CODE 636 W HCPCS: Performed by: INTERNAL MEDICINE

## 2022-08-19 PROCEDURE — 99223 PR INITIAL HOSPITAL CARE,LEVL III: ICD-10-PCS | Mod: ,,, | Performed by: INTERNAL MEDICINE

## 2022-08-19 PROCEDURE — 97530 THERAPEUTIC ACTIVITIES: CPT | Mod: CO

## 2022-08-19 PROCEDURE — 80053 COMPREHEN METABOLIC PANEL: CPT | Performed by: NURSE PRACTITIONER

## 2022-08-19 RX ORDER — TRAZODONE HYDROCHLORIDE 50 MG/1
50 TABLET ORAL NIGHTLY PRN
Status: DISCONTINUED | OUTPATIENT
Start: 2022-08-19 | End: 2022-08-26 | Stop reason: HOSPADM

## 2022-08-19 RX ADMIN — Medication 400 MG: at 08:08

## 2022-08-19 RX ADMIN — ENOXAPARIN SODIUM 80 MG: 80 INJECTION SUBCUTANEOUS at 04:08

## 2022-08-19 RX ADMIN — Medication 400 MG: at 02:08

## 2022-08-19 RX ADMIN — Medication 1 PACKET: at 08:08

## 2022-08-19 RX ADMIN — IOPAMIDOL 100 ML: 755 INJECTION, SOLUTION INTRAVENOUS at 08:08

## 2022-08-19 RX ADMIN — ATORVASTATIN CALCIUM 40 MG: 40 TABLET, FILM COATED ORAL at 08:08

## 2022-08-19 RX ADMIN — TRAZODONE HYDROCHLORIDE 50 MG: 50 TABLET ORAL at 08:08

## 2022-08-19 RX ADMIN — FUROSEMIDE 20 MG: 10 INJECTION, SOLUTION INTRAMUSCULAR; INTRAVENOUS at 04:08

## 2022-08-19 RX ADMIN — ASPIRIN 81 MG CHEWABLE TABLET 81 MG: 81 TABLET CHEWABLE at 08:08

## 2022-08-19 RX ADMIN — FAMOTIDINE 20 MG: 20 TABLET, FILM COATED ORAL at 08:08

## 2022-08-19 RX ADMIN — ENOXAPARIN SODIUM 80 MG: 80 INJECTION SUBCUTANEOUS at 05:08

## 2022-08-19 RX ADMIN — FUROSEMIDE 20 MG: 10 INJECTION, SOLUTION INTRAMUSCULAR; INTRAVENOUS at 05:08

## 2022-08-19 NOTE — CONSULTS
Inpatient consult to Palliative Care  Consult performed by: Alan Daigle MD  Consult ordered by: Jose Manuel Natarajan MD        Patient Name: Harpreet Rodas   MRN: [id number]   Admission Date: 8/13/2022   Hospital Length of Stay: 6   Attending Provider: Ana Rosa Fry MD   Consulting Provider: Alan Daigle M.D.  Reason for Consult: Goals of Care  Primary Care Physician: Primary Doctor No     Principal Problem: Anasarca     Patient information was obtained from patient and ER records.      Final diagnoses:  [R07.9] Chest pain  [J18.9] Pneumonia of right lower lobe due to infectious organism (Primary)  [A41.9] Sepsis, due to unspecified organism, unspecified whether acute organ dysfunction present  [R60.1] Anasarca  [I50.9] Acute congestive heart failure, unspecified heart failure type  [E87.5] Hyperkalemia  [I26.99] Pulmonary embolism, unspecified chronicity, unspecified pulmonary embolism type, unspecified whether acute cor pulmonale present  [R60.0] Edema of right upper extremity  [R60.0] Bilateral lower extremity edema     Assessment/Plan:     I reviewed the patient and family's understanding of the seriousness of the illness and its expected prognosis. We discussed the patient's goals of care and treatment preferences. We discussed the difference between palliative and curative medicine. I clarified current code status. I identified the surrogate decision maker or health care POA (No POA, but wife is surrogate-decision maker).  I answered all questions and we formulated a plan including recommendations for symptom management and how to best achieve goals of care.     I reviewed the patient's current clinical status with the nurse. I reviewed clinical documentation, labs and imaging. I met with the patient and attempted a review of his goals of care. The patient was obviously very anxious and became very loud and frustrated at times during the conversation. He has is very concerned about the potential  "results of the thoracentesis pathology which is pending. He understands that he has severe COPD, CHF, pulmonary embolus. However, he is happy that he has improved so much since arrival here. He said that he wants to be positive about everything and only focus on positive thoughts. He repeated to me several times in a loud voice, "I want to live! I want to live!" I acknowledged his feelings and his positive outlook. I asked him if it would be ok to review advanced care planning situation in relation to his current health condition. He was open to this but seemed very anxious. I explained to him that this discussion is necessary and beneficial for the staff, for himself and for his wife (if she is called upon to make medical decisions on his behalf).    I explained that considering the frailty of his heart and lung, related liver disease, malnutrition and weakness, an event such as a cardiac arrest would likely be a terminal event. As I began to discuss the options for resuscitation, he responded, "I want to be resuscitated." I explained that I would like to complete the discussion which includes risks and benefits as I wish for him to be fully educated to be able to make the best decision which includes not only his wishes to survive, but his ultimate safety, comfort and quality of life. He, however, would not allow me to complete the discussion and became very agitated and began yelling, "we don't even know the results of my tests yet." I tried to calm him by stating that I do not want to cause him to become upset but only to empower him with information to help him make educated decisions. I tried to explain that this may require a moment of a difficult conversation. I also tried to explain that regardless of the pending results, his underlying condition is what it is. He said loudly, "Doc I don't mean any disrespect, but I only want to talk about positive things, I don't want to talk about anything else!" I " politely ended the conversation by reviewing his complaints of anxiety and insomnia. I offered to order something that might help. He agreed.  I told him that I would return to offer support and renew our discussion at a later time. He agreed.    Code status: Full Code    Disposition: Patient would not allow a discussion relating to his plan of care upon discharge. I will return to try and renew this conversation next week or when patient is more relaxed and has had some time to grieve and accept his situation better.    Recommendations:     Insomnia. I will add trazodone 50 mg qhs prn. He has melatonin 6 mg, not taken yet. Trazodone can be a backup option.      History of Present Illness:     56 year old male with history of COPD, admitted with c/o of severe dyspnea on minimal exertion, including talking, present x 6 months. He quit drinking alcohol and smoking 2 months ago. He also starting eating healthy. Unfortunately he continued to feel worse and is now admitted with hypoxia, findings of pulmonary embolism, large Rt pleural effusion (s/p thoracentesis of 1L on 8/17/2022), pulmonary edema with severe systolic CHF with EF 15%, cardiac cachexia, probable hepatic cirrhosis with generalized anasarca. Cardiology has discussed ordering a life vest. I was consulted to review goals of care with the patient.      Active Ambulatory Problems     Diagnosis Date Noted    No Active Ambulatory Problems     Resolved Ambulatory Problems     Diagnosis Date Noted    No Resolved Ambulatory Problems     Past Medical History:   Diagnosis Date    COPD (chronic obstructive pulmonary disease)         Past Surgical History:   Procedure Laterality Date    MANDIBLE FRACTURE SURGERY          Review of patient's allergies indicates:  No Known Allergies       Current Facility-Administered Medications:     acetaminophen tablet 650 mg, 650 mg, Oral, Q4H PRN, Reginald Barker MD, 650 mg at 08/16/22 2222    albuterol-ipratropium 2.5 mg-0.5  "mg/3 mL nebulizer solution 3 mL, 3 mL, Nebulization, Q4H PRN, Reginald Barker MD    aspirin chewable tablet 81 mg, 81 mg, Oral, Daily, Jaylin Grissom, ART, 81 mg at 08/19/22 0844    atorvastatin tablet 40 mg, 40 mg, Oral, QHS, Kwasi Thao MD, 40 mg at 08/18/22 2252    enoxaparin injection 80 mg, 80 mg, Subcutaneous, Q12H, Kwasi Thao MD, 80 mg at 08/19/22 1622    famotidine tablet 20 mg, 20 mg, Oral, BID, Reginald Barker MD, 20 mg at 08/19/22 0844    furosemide injection 20 mg, 20 mg, Intravenous, Q12H, Kwasi Thao MD, 20 mg at 08/19/22 1622    magnesium oxide tablet 400 mg, 400 mg, Oral, TID, Kwasi Thao MD, 400 mg at 08/19/22 1437    melatonin tablet 6 mg, 6 mg, Oral, Nightly PRN, Reginald Barker MD    promethazine tablet 25 mg, 25 mg, Oral, Q6H PRN, Reginald Barker MD    sodium chloride 0.9% flush 10 mL, 10 mL, Intravenous, PRN, Yuni Little MD    sodium chloride 0.9% flush 10 mL, 10 mL, Intravenous, PRN, Reginald Barker MD     acetaminophen, albuterol-ipratropium, melatonin, promethazine, sodium chloride 0.9%, sodium chloride 0.9%     Family History   Problem Relation Age of Onset    Cancer Mother     Cancer Brother         Review of Systems   Constitutional: Positive for activity change, appetite change, fatigue and unexpected weight change.   HENT: Negative for trouble swallowing.    Respiratory: Positive for shortness of breath. Negative for cough.    Cardiovascular: Positive for leg swelling.   Gastrointestinal: Negative for diarrhea, nausea and vomiting.   Musculoskeletal: Negative for arthralgias.   Skin: Negative.    Psychiatric/Behavioral: Positive for sleep disturbance. The patient is nervous/anxious.             Objective:   /78   Pulse 101   Temp 97.5 °F (36.4 °C) (Oral)   Resp 18   Ht 5' 11" (1.803 m)   Wt 64.5 kg (142 lb 3.2 oz)   SpO2 97%   BMI 19.83 kg/m²      Physical Exam  Vitals reviewed.   Constitutional:       Appearance: He is ill-appearing.   HENT: "      Head:      Comments: Temporal wasting     Nose: Nose normal.      Mouth/Throat:      Mouth: Mucous membranes are moist.      Pharynx: Oropharynx is clear.   Eyes:      Extraocular Movements: Extraocular movements intact.      Conjunctiva/sclera: Conjunctivae normal.      Pupils: Pupils are equal, round, and reactive to light.   Cardiovascular:      Rate and Rhythm: Normal rate and regular rhythm.      Pulses: Normal pulses.   Pulmonary:      Comments: Labored breathing with talking, improves with rest  Abdominal:      General: Abdomen is flat. Bowel sounds are normal. There is no distension.      Tenderness: There is no abdominal tenderness.   Musculoskeletal:         General: Swelling and tenderness present. Normal range of motion.      Cervical back: Normal range of motion.      Right lower leg: Edema present.      Left lower leg: Edema present.   Skin:     General: Skin is warm.   Neurological:      General: No focal deficit present.      Mental Status: He is alert and oriented to person, place, and time.   Psychiatric:         Mood and Affect: Mood normal.         Behavior: Behavior normal.         Thought Content: Thought content normal.         Judgment: Judgment normal.            Review of Symptoms  Review of Symptoms    Symptom Assessment (ESAS 0-10 Scale)  Pain:  0  Dyspnea:  0  Anxiety:  0  Nausea:  0  Depression:  0  Anorexia:  0  Fatigue:  0  Insomnia:  0  Restlessness:  0  Agitation:  0     CAM / Delirium:  Negative  Constipation:  Negative  Diarrhea:  Negative    Anxiety:  Is nervous/anxious    Modified Luciana Scale:  2    Performance Status:  50    Living Arrangements:  Lives with spouse and Lives in home    Spiritual:  F - Erin and Belief:  Mandaeism  I - Importance:  Not important  A - Address in Care:  Offered  and patient agreed      Advance Care Planning   Advance Directives:   [place]: No    Do Not Resuscitate Status: No    Medical Power of : No    Agent's Name:  WifeWerner  Clark   Agent's Contact Number:  [phone]         PAINAD: N/A        > 50% of 70 min of encounter was spent in chart review, face to face discussion of goals of care, symptom assessment, coordination of care and emotional support.       Alan Daigle M.D.  Palliative Medicine  [facility]

## 2022-08-19 NOTE — PROGRESS NOTES
"[facility]  HOSPITAL MEDICINE PROGRESS NOTE      Patient Name: Harpreet Rodas  MRN: [id number]  Admission Date: 8/13/2022 12:32 PM  Status: IP- Inpatient   Length of Stay: 6 days  Face-to-Face encounter date: 08/19/2022       CHIEF COMPLAINT  Follow-up on respiratory failure and congestive heart failure      HOSPITAL COURSE  Patient is a 56-year-old white male with a past medical history of chronic obstructive pulmonary disease. The patient presented to [facility] on 8/13/2022 with a primary complaint of dyspnea on exertion, edema and chest pain.      Patient reports that for about 6 months he has had worsening dyspnea on exertion. Currently, just walking in his living room make him extremely short of breath. He started having swelling of his right arm and both legs 3 days ago. He started having diarrhea at the same time. He has lost weight. He says he gets hungry but feels full after a few bites and the food goes right through him. He came to the ER he began having left side chest pain, "a sticking sensation". He denies associated symptoms and it only lasts a short time. No radiation. No known history of CAD. Denies fever, chills or sweats. He quit smoking and drinking about 2 months ago after his wife had pneumonia and his breathing was worsening. He was drinking 4-5 40oz beers a week prior to quitting. The pt became very short of breath when trying to get into wheelchair for chest CT. However, he was able to tolerate CT and lay flat when transported by bed. He was given Lasix in the ED. He says he is feeling better. He is not on home oxygen. Only home med is an inhaler. Echocardiogram showed LVEF 15%, grade III left ventricular diastolic dysfunction, pulmonary hypertension, mild tricuspid regurgitation, moderate right atrial enlargement.    Interval history:  Patient is sitting up in bed with no acute issues reported. He is afebrile, hemodynamically stable and on supplemental oxygen at 2 " liters/minute via nasal cannula.    OBJECTIVE    VITAL SIGNS: 24 HRS MIN & MAX LAST   Temp  Min: 97.5 °F (36.4 °C)  Max: 98.5 °F (36.9 °C) 97.7 °F (36.5 °C)   BP  Min: 108/73  Max: 120/82 109/67   Pulse  Min: 81  Max: 96  93   Resp  Min: 18  Max: 22 (!) 22   SpO2  Min: 95 %  Max: 99 % 96 %       LABS/MICROBIOLOGY/MEDICATIONS/DIAGNOSTICS  I have reviewed all pertinent lab results within the past 24 hours.    Recent Labs   Lab 08/16/22  0353 08/18/22  0419 08/19/22  0647   WBC 7.4 8.1 9.1   RBC 4.17* 4.02* 3.92*   HGB 13.0* 12.2* 11.9*   HCT 40.5* 40.4* 39.8*   MCV 97.1* 100.5* 101.5*   MCH 31.2* 30.3 30.4   MCHC 32.1* 30.2* 29.9*   RDW 13.3 13.2 13.4    131 142   MPV 11.9* 10.3 9.8       Recent Labs   Lab 08/13/22  1247 08/13/22  1247 08/13/22  1255 08/14/22  0658 08/15/22  0330 08/16/22  0353 08/16/22  0819 08/17/22  0537 08/17/22  1022 08/18/22  0419 08/19/22  0647   NA  --   --    < > 139 141 140  --  139  --  138 139   K  --   --    < > 5.2* 3.5 3.2*  --  3.8  --  3.8 3.6   CO2  --   --    < > 33* 37* 41*  --  43*  --  39* 43*   BUN  --   --    < > 43.2* 38.1* 26.2*  --  19.0  --  16.0 14.2   CREATININE  --   --    < > 1.09 0.86 0.70*  --  0.71*  --  0.69* 0.64*   CALCIUM  --   --    < > 8.6 8.0* 7.8*  --  7.7*  --  8.0* 8.3*   PH 7.302 7.302*  --   --   --   --  7.39  --   --   --   --    MG  --   --    < > 1.50* 1.50* 1.50*  --  1.90  --  2.00  --    ALBUMIN  --   --    < > 2.6* 2.6*  --   --   --  2.5*  --  2.8*   ALKPHOS  --   --    < > 69 86  --   --   --   --   --  93   ALT  --   --    < > 21 23  --   --   --   --   --  33   AST  --   --    < > 25 25  --   --   --   --   --  45*   BILITOT  --   --    < > 0.7 0.5  --   --   --   --   --  1.0    < > = values in this interval not displayed.       Recent Labs     08/18/22  0419 08/19/22  0647   WBC 8.1 9.1   RBC 4.02* 3.92*   HGB 12.2* 11.9*   HCT 40.4* 39.8*   .5* 101.5*   MCH 30.3 30.4   MCHC 30.2* 29.9*   RDW 13.2 13.4     Recent Labs      08/17/22  1022 08/18/22  0419 08/19/22  0647   CHLORIDE  --  89* 89*   CO2  --  39* 43*   BUN  --  16.0 14.2   CREATININE  --  0.69* 0.64*   GLUCOSE  --  96 83   CALCIUM  --  8.0* 8.3*   ALBUMIN 2.5*  --  2.8*   ALKPHOS  --   --  93   ALT  --   --  33   AST  --   --  45*       Microbiology Results (last 7 days)     Procedure Component Value Units Date/Time    Body Fluid Culture [[id number]] Collected: 08/17/22 1052    Order Status: Completed Specimen: Body Fluid from Thoracentesis Fluid Updated: 08/19/22 0908     Body Fluid Culture No Growth At 48 Hours    Blood Culture [[id number]]  (Normal) Collected: 08/13/22 1314    Order Status: Completed Specimen: Blood from Arm, Left Updated: 08/18/22 2102     CULTURE, BLOOD ([facility]) No Growth at 5 days    Blood Culture [[id number]]  (Normal) Collected: 08/13/22 1314    Order Status: Completed Specimen: Blood from Hand, Left Updated: 08/18/22 2102     CULTURE, BLOOD ([facility]) No Growth at 5 days    Gram Stain [[id number]] Collected: 08/17/22 1052    Order Status: Completed Specimen: Body Fluid from Pleural Fluid Updated: 08/18/22 0745     GRAM STAIN No WBCs, No bacteria seen     Gram Stain [[id number]] Collected: 08/17/22 1052    Order Status: Canceled Specimen: Thoracentesis Fluid Updated: 08/17/22 1242    Fungal Culture [[id number]] Collected: 08/17/22 1052    Order Status: Canceled Specimen: Thoracentesis Fluid Updated: 08/17/22 1135    Fungal Culture [[id number]] Collected: 08/17/22 1052    Order Status: Sent Specimen: Body Fluid from Pleural Fluid Updated: 08/17/22 1132    Mycobacteria and Nocardia Culture [[id number]] Collected: 08/17/22 1052    Order Status: Canceled Specimen: Body Fluid from Thoracentesis Fluid Updated: 08/17/22 1132    Mycobacteria and Nocardia Culture [[id number]] Collected: 08/17/22 1052    Order Status: Sent Specimen: Body Fluid from Thoracentesis Fluid Updated: 08/17/22 1131    Respiratory Culture [[id number]]     Order Status: Sent Specimen: Sputum, Expectorated      Urine culture [[id number]] Collected: 08/13/22 1254    Order Status: Completed Specimen: Urine, Clean Catch Updated: 08/15/22 0958     Urine Culture No Growth    Clostridium Diff Toxin, A & B, EIA [[id number]] Collected: 08/13/22 2310    Order Status: Sent Specimen: Stool     Stool Culture **CANNOT BE ORDERED STAT** [[id number]]     Order Status: Sent Specimen: Stool            X-Ray Chest 1 View  Narrative: EXAMINATION:  XR CHEST 1 VIEW    CPT 52301    CLINICAL HISTORY:  Repeat CXR s/p prior w/ Small Apical Pneumothorax;    COMPARISON:  August 17, 2022    FINDINGS:  Cardiomediastinal silhouette improved parenchymal changes to be essentially unchanged as compared with previous exam persistent right-sided pneumothorax distance from apex to pleural reflection of approximately 3 cm there is a basilar component to the pneumothorax.    There is no other significant interval changes compared with the previous exam  Impression: Persistent right-sided pneumothorax with a basilar component hard to ascertain whether the basilar component might be related to an element of trapped lung.    No other significant change    Electronically signed by: Jefferson Blackman  Date:    08/17/2022  Time:    15:37  X-Ray Chest 1 View  Narrative: EXAMINATION:  XR CHEST 1 VIEW    CLINICAL HISTORY:  S/p right thoracentesis; right pleural effusion; .    COMPARISON:  Chest x-ray 08/13/2022    FINDINGS:  Frontal view of the chest was obtained.  The cardiac silhouette is enlarged, grossly stable.  The aorta is partially calcified.  There is prominence and indistinctness of the central vasculature and monie which may reflect pulmonary vascular congestion.  Small bilateral pleural effusions are seen.  The right pleural effusion appears to have decreased in size, compatible with thoracentesis.  There appears to be small right apical and lateral lower thoracic pneumothorax.  There are increased interstitial opacities in bilateral lungs which could  reflect interstitial edema.  Bilateral basilar predominant airspace opacities are seen with mild improved aeration in the right lung base, possibly related to atelectasis, but underlying infiltrate cannot be excluded.  Calcified pleural plaques in the lower left thorax are again noted.  Thoracic dextroscoliosis is seen.  Impression: 1. The right pleural effusion appears to have decreased in size, compatible with thoracentesis.  There appears to be small right apical and lateral lower thoracic pneumothorax.  The findings are reported to to the patient's unit nurse, Brenden, on 8/17/2022 at approximately 12:16.  A secure epic message regarding the findings was also sent to Dr. Parnell at the same time.    Electronically signed by: Brando Hill MD  Date:    08/17/2022  Time:    12:18        Scheduled Med:   aspirin  81 mg Oral Daily    atorvastatin  40 mg Oral QHS    enoxaparin  80 mg Subcutaneous Q12H    famotidine  20 mg Oral BID    furosemide (LASIX) injection  20 mg Intravenous Q12H    magnesium oxide  400 mg Oral TID        Continuous Infusions:  None.    PRN Meds:  acetaminophen, albuterol-ipratropium, melatonin, promethazine, sodium chloride 0.9%, sodium chloride 0.9%       PHYSICAL EXAM  General:  Cachectic chronically ill-appearing white male who appears much older than his stated age, in no acute distress with generalized edema  HENT: normocephalic, atraumatic, poor dentition  Eye: PERRL, EOMI, clear conjunctiva  Neck: full ROM, no thyromegaly  Respiratory:  Diminished breath sounds with bibasilar crackles  Cardiovascular: regular rate and rhythm, bilateral upper and lower extremity edema  Gastrointestinal: non-distended, positive bowel sounds, non-tender  Genitourinary:  Scrotal edema  Musculoskeletal: no gross deformity  Integumentary: warm, dry, intact, no rashes  Neurological: cranial nerves grossly intact, no focal neurological deficit  Psychiatric: cooperative      ASSESSMENT  Acute hypoxic  respiratory failure  Acute combined systolic and diastolic congestive heart failure left ventricular ejection fraction of 15% and grade III left ventricular diastolic dysfunction  Left lower lobe acute pulmonary thromboembolism  Large right pleural effusion status post right thoracentesis 8/17/22 with 1030 mL straw colored fluid removed  Anasarca and ascites  Pulmonary hypertension  Probable hepatic cirrhosis with long history of alcohol abuse  COPD with emphysema with long history of tobacco abuse  Macrocytic anemia  Type 2 NSTEMI  Cachexia      PLAN  Continue supplemental oxygen, wean oxygen as tolerated, keep SaO2 greater than 90%  Assess need for home oxygen prior to discharge  Blood cultures and urine culture are thus far negative  Follow-up on pleural fluid studies  CIS to continue diuresis with intravenous Lasix 20 mg every 12 hours and replace electrolytes as needed  Continue ASA and statin  CIS to start GDMT when close to euvolemia  Will need Lifevest prior to discharge  Will need ischemic work up inpatient when euvolemic  Continue therapeutic dose Lovenox. Plan to switch to oral anticoagulation prior to discharge  Bilateral upper and lower extremity Doppler ultrasounds were negative for deep or superficial vein thrombosis   HIV, Hepatitis panel, syphilis were all negative  Pulmonology has ordered a CT of the abdomen and pelvis with contrast as part of a malignancy evaluation  Patient has an extremely poor long-term prognosis.  Will request an evaluation from palliative medicine.    DVT Prophylaxis:  Full-dose Lovenox    Critical Care Diagnosis: Acute combined systolic congestive heart failure requiring IV diuretics; acute hypoxic respiratory failure requiring supplemental oxygen  Critical Care Interventions: Hands-on evaluation, review of labs, radiographs, medical records, and discussion with the patient and medical staff in order to assess and manage the high probability of imminent or life-threatening  deterioration of cardio-respiratory status requiring vasopressor support and/or intubation and mechanical ventilation.  Critical Care Time Spent: 35 minutes      Patient condition:  Guarded    Anticipated discharge and disposition: TBD  __________________________________________________________________________    All diagnosis and differential diagnosis have been reviewed; assessment and plan have been documented. I have personally reviewed the test results that are presently available; I have reviewed the patient's medication list. I have reviewed the consulting providers' recommendations. I have reviewed or attempted to review medical records based upon their availability.  All of the patient's questions have been addressed and answered. Patient is agreeable to the above stated plan. I will continue to monitor closely and make adjustment to medical management as needed.    This document was created with the assistance of a voice recognition software. There may be transcription errors as a result of using this technology, however minimal. Effort has been made to ensure accuracy of transcription but any obvious errors or omissions should be clarified with the author of this document.        Jose Manuel Natarjaan MD   [facility]  08/19/2022

## 2022-08-19 NOTE — PROGRESS NOTES
[facility] - 3rd Floor Medical Telemetry  Cardiology  Progress Note    Patient Name: Harpreet Rodas  MRN: [id number]  Admission Date: 8/13/2022  Hospital Length of Stay: 6 days  Code Status: Full Code   Attending Provider: Ana Rosa Fry MD   Consulting Provider: ART De Oliveira  Primary Care Physician: Primary Doctor No  Principal Problem:Anasarca    Patient information was obtained from patient and ER records.     Subjective:     Chief Complaint:       HPI:   Mr. Mayen is a 57y/o male, unknown to [facility], with PMHx significant for COPD and previous heavy smoker/drinker who presented to [facility] via EMS for c/o SOB, MON, orthopnea, peripheral edema, diarrhea, and chest pain described as sticking sensation. No radiation or associated symptoms.  RA sat upon EMS arrival-88% and he was placed on Bipap. In ED, workup significant for BUN/creatinine 45/1.36, K 6.1, D-dimer 3.05, BNP 3280, Troponin 0.134-0.120-0.105. CXR revealing bilateral pleural effusions. Right> left with possible RLL opacification. CTA chest obtained LLL PE, large right pleural effusion, and findings of anasarca. EF revealing EF 15%, Grade III LVDD and RA/RV enlargement. He was placed on diuretics with consult placed to [facility] for PVC and 2nd degree Type II AVB. Review of tele reveals SR with non-conducted PACs. No 2nd degree heart block noted.     Hospital Course:  8/16/22: Patient awake in bed. Good diuresis with Lasix. Still volume overloaded.   8/17/22: Patient awake in bed. Continues to require O2 support. S/p right sided thoracentesis with reported removal of 1030 L straw colored fluid. CO2 rising. Patient started on diamox. Renal indices stable. -5.5L UOP x 24hr  8/18/22: Patient sitting up in bedside chair. Reports SOB and peripheral edema improving.   8/19/22: Patient awake in bed. NAD. Diminished breath sounds.       PMH: COPD  PSH: mandible fx surgery,  Family History: Mother- cancer; brother- cancer  Social History: previous  heavy smoker (2ppd)/drinker- quit 6/22    Previous Cardiac Diagnostics:   Venous US BUE 08/14/22:  No evidence of deep or superficial vein thrombosis in bilateral upper extremities.     Venous US BLE 08/14/22:  Negative DVT to BLE    TTE 08/13/22:  Severely decreased Systolic function. Estimated EF 15%. Grade III LVDD. There is pulmonary HTN, Mild TR. Moderate RV enlargement. Moderate RA enlargement. Intermediate CVP 8 mmHg. Estimated PASP 50mmHg    Review of Systems   Constitutional: Positive for activity change and fatigue.   Respiratory: Positive for shortness of breath.    Cardiovascular: Positive for leg swelling. Negative for chest pain.   Gastrointestinal: Positive for abdominal distention.   Genitourinary: Positive for scrotal swelling.   Neurological: Negative.    Psychiatric/Behavioral: Negative.        Objective:     Vital Signs (Most Recent):  Temp: 98.3 °F (36.8 °C) (08/19/22 0412)  Pulse: 92 (08/19/22 0412)  Resp: 18 (08/19/22 0412)  BP: 108/73 (08/19/22 0412)  SpO2: 98 % (08/19/22 0412) Vital Signs (24h Range):  Temp:  [97.7 °F (36.5 °C)-98.5 °F (36.9 °C)] 98.3 °F (36.8 °C)  Pulse:  [73-96] 92  Resp:  [18-20] 18  SpO2:  [95 %-100 %] 98 %  BP: (103-120)/(68-82) 108/73     Weight: 64.5 kg (142 lb 3.2 oz)  Body mass index is 19.83 kg/m².    SpO2: 98 %  O2 Device (Oxygen Therapy): nasal cannula w/ humidification      Intake/Output Summary (Last 24 hours) at 8/19/2022 0607  Last data filed at 8/19/2022 0400  Gross per 24 hour   Intake 120 ml   Output 1925 ml   Net -1805 ml       Lines/Drains/Airways     Peripheral Intravenous Line  Duration                Peripheral IV - Single Lumen 08/14/22 2000 Left;Posterior Forearm 4 days                Significant Labs:  Recent Results (from the past 72 hour(s))   POCT ARTERIAL BLOOD GAS    Collection Time: 08/16/22  8:19 AM   Result Value Ref Range    POC PH 7.39 7.35 - 7.45    POC PCO2 90 (AA) 19 - 50 mmHg    POC PO2 64 (A) 80 - 100 mmHg    POC HEMOGLOBIN 13.3 12.0  - 16.0 g/dL    POC SATURATED O2 91.9 %    POC O2Hb 91.7 (A) 94.0 - 97.0 %    POC COHb 2.3 %    POC MetHb 1.2 0.40 - 1.5 %    POC Potassium 2.7 (A) 3.5 - 5.0 mmol/l    POC Sodium 132 (A) 137 - 145 mmol/l    POC Ionized Calcium 1.01 (A) 1.12 - 1.23 mmol/l    Correct Temperature (PH) 7.39 7.35 - 7.45    Corrected Temperature (pCO2) 90 (AA) 19 - 50 mmHg    Corrected Temperature (pO2) 64 (A) 80 - 100 mmHg    POC HCO3 54.5 mmol/l    Base Deficit 24.0 (A) -2.00 - 2.00 mmol/l    POC Temp 37.0 °C    Specimen source Arterial sample    Basic Metabolic Panel    Collection Time: 08/17/22  5:37 AM   Result Value Ref Range    Sodium Level 139 136 - 145 mmol/L    Potassium Level 3.8 3.5 - 5.1 mmol/L    Chloride 86 (L) 98 - 107 mmol/L    Carbon Dioxide 43 (HH) 22 - 29 mmol/L    Glucose Level 98 74 - 100 mg/dL    Blood Urea Nitrogen 19.0 8.4 - 25.7 mg/dL    Creatinine 0.71 (L) 0.73 - 1.18 mg/dL    BUN/Creatinine Ratio 27     Calcium Level Total 7.7 (L) 8.4 - 10.2 mg/dL    Anion Gap 10.0 mEq/L    eGFR >60 mls/min/1.73/m2   Magnesium    Collection Time: 08/17/22  5:37 AM   Result Value Ref Range    Magnesium Level 1.90 1.60 - 2.60 mg/dL   Phosphorus    Collection Time: 08/17/22  5:37 AM   Result Value Ref Range    Phosphorus Level 1.7 (L) 2.3 - 4.7 mg/dL   Albumin    Collection Time: 08/17/22 10:22 AM   Result Value Ref Range    Albumin Level 2.5 (L) 3.5 - 5.0 gm/dL   Lactate Dehydrogenase    Collection Time: 08/17/22 10:22 AM   Result Value Ref Range    Lactate Dehydrogenase 267 (H) 125 - 220 U/L   Cell Count, Body Fluid    Collection Time: 08/17/22 10:52 AM   Result Value Ref Range    WBC  /uL   Gram Stain    Collection Time: 08/17/22 10:52 AM    Specimen: Pleural Fluid; Body Fluid   Result Value Ref Range    GRAM STAIN No WBCs, No bacteria seen     Body Fluid Culture    Collection Time: 08/17/22 10:52 AM    Specimen: Thoracentesis Fluid; Body Fluid   Result Value Ref Range    Body Fluid Culture No Growth At 24 Hours     Differential, Body Fluid    Collection Time: 08/17/22 10:52 AM   Result Value Ref Range    Neutrophils % 22 %    Lymphocyte % 76 %    Monocyte % 2 %    Mesothelial count 3    Glucose, Random    Collection Time: 08/17/22 10:00 PM   Result Value Ref Range    CBG 98    Glucose, Random    Collection Time: 08/18/22 12:00 AM   Result Value Ref Range    CBG 86    Basic Metabolic Panel    Collection Time: 08/18/22  4:19 AM   Result Value Ref Range    Sodium Level 138 136 - 145 mmol/L    Potassium Level 3.8 3.5 - 5.1 mmol/L    Chloride 89 (L) 98 - 107 mmol/L    Carbon Dioxide 39 (H) 22 - 29 mmol/L    Glucose Level 96 74 - 100 mg/dL    Blood Urea Nitrogen 16.0 8.4 - 25.7 mg/dL    Creatinine 0.69 (L) 0.73 - 1.18 mg/dL    BUN/Creatinine Ratio 23     Calcium Level Total 8.0 (L) 8.4 - 10.2 mg/dL    Anion Gap 10.0 mEq/L    eGFR >60 mls/min/1.73/m2   Phosphorus    Collection Time: 08/18/22  4:19 AM   Result Value Ref Range    Phosphorus Level 2.7 2.3 - 4.7 mg/dL   Magnesium    Collection Time: 08/18/22  4:19 AM   Result Value Ref Range    Magnesium Level 2.00 1.60 - 2.60 mg/dL   CBC with Differential    Collection Time: 08/18/22  4:19 AM   Result Value Ref Range    WBC 8.1 4.5 - 11.5 x10(3)/mcL    RBC 4.02 (L) 4.70 - 6.10 x10(6)/mcL    Hgb 12.2 (L) 14.0 - 18.0 gm/dL    Hct 40.4 (L) 42.0 - 52.0 %    .5 (H) 80.0 - 94.0 fL    MCH 30.3 27.0 - 31.0 pg    MCHC 30.2 (L) 33.0 - 36.0 mg/dL    RDW 13.2 11.5 - 17.0 %    Platelet 131 130 - 400 x10(3)/mcL    MPV 10.3 7.4 - 10.4 fL    Neut % 71.0 %    Lymph % 16.7 %    Mono % 10.1 %    Eos % 1.4 %    Basophil % 0.4 %    Lymph # 1.36 0.6 - 4.6 x10(3)/mcL    Neut # 5.8 2.1 - 9.2 x10(3)/mcL    Mono # 0.82 0.1 - 1.3 x10(3)/mcL    Eos # 0.11 0 - 0.9 x10(3)/mcL    Baso # 0.03 0 - 0.2 x10(3)/mcL    IG# 0.03 0 - 0.04 x10(3)/mcL    IG% 0.4 %    NRBC% 0.0 %   POCT glucose    Collection Time: 08/18/22  4:29 AM   Result Value Ref Range    POCT Glucose 81 70 - 110 mg/dL       Significant  Imaging:  Imaging Results          CTA Chest Abdomen Non Coronary (Final result)  Result time 08/13/22 15:55:52   Procedure changed from CTA Chest Non-Coronary (PE Study)     Final result by Basilio De Oliveira MD (08/13/22 15:55:52)                 Impression:      Left lower lobe pulmonary thromboembolism is noted.    Large right pleural effusion is present.    Findings of anasarca.    The bladder wall is prominent.  Correlate with urinalysis.    There are cortical defects of the bilateral kidneys likely related to chronic renal disease and scarring, however infarcts are less likely.    The liver appears heterogeneous which may be related to phase of contrast however is cirrhosis is not excluded.  Cardiomegaly is present.  Further evaluation may be obtained with ECHO.    Findings reported to Dr. Little prior to interpretation.      Electronically signed by: Basilio De Oliveira  Date:    08/13/2022  Time:    15:55             Narrative:    EXAMINATION:  CTA CHEST ABDOMEN NON CORONARY (XPD)    CLINICAL HISTORY:  Pulmonary embolism (PE) suspected, positive D-dimer;    TECHNIQUE:  Axial CTA images of the chest, abdomen, and pelvis were obtained With Contrast. Sagittal and coronal reconstructed images were available for review.    Automatic exposure control was utilized to reduce the patient's radiation dose.    DLP = 582    COMPARISON:  No prior images available for comparison.    FINDINGS:  PULMONARY ARTERY: Thrombus is identified in the left lower lobe pulmonary artery.    AORTA: The thoracoabdominal aorta is normal in course and caliber. Scattered atherosclerotic disease is noted.    HEART: The heart is enlarged.  No pericardial effusion.    THYROID GLAND: The thyroid is not enlarged. There are no nodules identified.    AIRWAYS: Trachea is midline and tracheobronchial tree is patent.    LUNGS: Large right effusion with subsegmental atelectatic changes at the right base.  Emphysematous changes throughout the  lungs.    THROACIC LYMPH NODES: There is no significant mediastinal, axillary or hilar lymphadenopathy.    HEPATOBILIARY: Somewhat nodular contour of the superior aspect of the liver with some heterogeneity may be related to phase of contrast versus cirrhosis.  Correlate with patient's history.  The gallbladder is normal.    SPLEEN: Normal    PANCREAS: No focal masses or ductal dilatation.    ADRENALS: No adrenal nodules.    KIDNEYS: No evidence of hydronephrosis.  Bilateral renal cortical perfusional defects likely related to scarring in chronic kidney disease.  Correlate with patient's history.  Less likely infarcts.    ABDOMINAL LYMPHADENOPATHY/RETROPERITONEUM: There is no retroperitoneal lymphadenopathy.    BOWEL: No acute bowel related abnormalities.    PELVIC VISCERA: The bladder wall is somewhat prominent.  Correlate with urinalysis.    PELVIC LYMPH NODES: No lymphadenopathy.    PERITONEUM/ BODY WALL: Diffuse body wall edema with moderate ascites noted.    SKELETAL: No aggressive appearing lytic/blastic lesion. No acute fractures, subluxations or dislocations.                               X-Ray Chest 1 View (Final result)  Result time 08/13/22 12:44:56    Final result by Basilio De Oliveira MD (08/13/22 12:44:56)                 Impression:      Right greater than left pleural effusion with possible right lower lobe opacification.  Underlying infectious process is not excluded.  Recommend continued follow-up.      Electronically signed by: Basilio De Oliveira  Date:    08/13/2022  Time:    12:44             Narrative:    EXAMINATION:  XR CHEST 1 VIEW    CLINICAL HISTORY:  shortness of breath;    TECHNIQUE:  Single view of the chest    COMPARISON:  No prior imaging available for comparison.    FINDINGS:  Right greater than left pleural effusion with possible right lower lobe opacification.  Underlying infectious process is not excluded.  Recommend continued follow-up.                                EKG:       Telemetry:        Physical Exam  HENT:      Mouth/Throat:      Mouth: Mucous membranes are moist.      Comments: Poor dentition  Cardiovascular:      Rate and Rhythm: Normal rate and regular rhythm.      Heart sounds: Normal heart sounds.      Comments: anasarca  Pulmonary:      Breath sounds: Rales present.      Comments: Conversational dyspnea  Abdominal:      Palpations: Abdomen is soft.   Musculoskeletal:      Right lower leg: Edema present.      Left lower leg: Edema present.   Skin:     General: Skin is warm.   Neurological:      General: No focal deficit present.      Mental Status: He is alert.   Psychiatric:         Mood and Affect: Mood normal.         Home Medications:   No current facility-administered medications on file prior to encounter.     Current Outpatient Medications on File Prior to Encounter   Medication Sig Dispense Refill    albuterol sulfate (INV ALBUTEROL) 90 mcg inhalation Inhale into the lungs as needed. Take one puff by mouth as directed by Physician.         Current Inpatient Medications:    Current Facility-Administered Medications:     acetaminophen tablet 650 mg, 650 mg, Oral, Q4H PRN, Reginald Barker MD, 650 mg at 08/16/22 2222    albuterol-ipratropium 2.5 mg-0.5 mg/3 mL nebulizer solution 3 mL, 3 mL, Nebulization, Q4H PRN, Reginald Barker MD    aspirin chewable tablet 81 mg, 81 mg, Oral, Daily, ART De Oliveira, 81 mg at 08/18/22 0823    atorvastatin tablet 40 mg, 40 mg, Oral, QHS, Kwasi Thao MD, 40 mg at 08/18/22 2252    enoxaparin injection 80 mg, 80 mg, Subcutaneous, Q12H, Kwasi Thao MD, 80 mg at 08/19/22 0538    famotidine tablet 20 mg, 20 mg, Oral, BID, Reginald Barker MD, 20 mg at 08/18/22 2253    furosemide injection 20 mg, 20 mg, Intravenous, Q12H, Kwasi Thao MD, 20 mg at 08/19/22 0538    magnesium oxide tablet 400 mg, 400 mg, Oral, TID, Kwasi Thao MD, 400 mg at 08/18/22 2252    melatonin tablet 6 mg, 6 mg, Oral, Nightly PRN, Reginald BOWLES  MD Mj    potassium, sodium phosphates 280-160-250 mg packet 1 packet, 1 packet, Oral, QID (WM & HS), Kwasi Thao MD, 1 packet at 08/18/22 0503    promethazine tablet 25 mg, 25 mg, Oral, Q6H PRN, Reginald Barker MD    sodium chloride 0.9% flush 10 mL, 10 mL, Intravenous, PRN, Yuni Little MD    sodium chloride 0.9% flush 10 mL, 10 mL, Intravenous, PRN, Reginald Barker MD         VTE Risk Mitigation (From admission, onward)         Ordered     enoxaparin injection 80 mg  Every 12 hours (non-standard times)         08/15/22 1314     IP VTE HIGH RISK PATIENT  Once         08/13/22 1523     Place sequential compression device  Until discontinued         08/13/22 1523     Place GIACOMO hose  Until discontinued         08/13/22 1523                Assessment:   CMO, unspecified  --EF 15%  Acute combined systolic and diastolic HF  --EF 15%; Grade III LVDD  --BNP 3280  --s/p right thoracentesis with 1030 fluid removal' f/u xray revealing small apical pneumothorax ? Element of trapped lung  NSTEMI, Type II s/t heart failure  --Troponin 0.134->0.120->0.105  Left BBB  LLL PE  ANNETTA  --improving with diuresis  COPD  Former smoker  No hx GIB    Plan:   Excellent UOP. Still overloaded. Continue diuresis. Ensure accurate I&O's/daily weights  Will start GDMT once patient is closer to euvolemia  Troponin elevation with flat trend likely s/t CMO. Will plan for coronary evaluation once euvolemic pending stable renal function.   Continue aspirin, atorvastatin, and weight based lovenox bid.  Lifevest will be needed prior to DC- order placed to case management  No indication for mechanical thrombectomy as patient is hemodynamically stable. Continue weight based Lovenox with plans to transition to DOAC prior to DC.         Jaylin Grissom, ART  Cardiology  [facility] - 3rd Floor Medical Telemetry  08/19/2022

## 2022-08-19 NOTE — PROGRESS NOTES
Inpatient Nutrition Assessment    Admit Date: 8/13/2022   Length of Stay: 6 days  Nutrition Recommendation/Prescription     1. Continue diet as ordered.   2. Continue Boost Plus once daily for additional nutrition (360kcals, 14g protein per serving)    Communication of Recommendations: reviewed with patient and/or caregiver    Nutrition Assessment     Malnutrition Assessment/Nutrition-Focused Physical Exam    Malnutrition in the context of acute illness or injury  Degree of Malnutrition: non-severe (moderate) malnutrition  Energy Intake: < 75% of estimated energy requirement for > 7 days, improving since admit  Interpretation of Weight Loss: does not meet criteria  Body Fat:mild depletion  Area of Body Fat Loss: orbital region   Muscle Mass Loss: mild depletion  Area of Muscle Mass Loss: temple region - temporalis muscle  Fluid Accumulation: moderate to severe  Edema: 2+ edema - mild and 3+ edema - moderate   Anasarca, 3+ pitting edema right upper extremity and bilateral lower extremity to, + scrotal edema  Reduced  Strength: unable to obtain  A minimum of two characteristics is recommended for diagnosis of either severe or non-severe malnutrition.    Chart Review    Reason Seen: continuous nutrition monitoring    Diagnosis:  CMO, unspecified  Acute combined systolic and diastolic HF  NSTEMI, type II s/y heart failure  LLL PE  Left BBB  ANNETTA  COPD    Relevant Medical History: COPD, previous heavy smoker/drinker    Nutrition-Related Medications: famotidine, furosemide, magnesium oxide.  Calorie Containing IV Medications: no significant kcals from medications at this time    Nutrition-Related Labs:  8/15 Cl 94, CO2 37, BUN 38.1, Ca 8, Mg 1.5, Total pro 5.5, Alb 2.6  8/19 Cl 89, CO2 43, Ca 8.3, Total pro 5.7, Alb 2.8, AST 45    Current Diet/PN: Diet heart healthy  Current Oral Supplements: none at this time  Current Tube Feeding: none at this time  Appetite/Oral Intake: good/% of meals  Factors Affecting  "Nutritional Intake: none identified at this time  Food/Confucianism/Cultural Preferences: none reported    Skin Integrity: puncture  Wound(s):   none documented    Comments    8/15 Pt seen for diet education and MST score- decreased appetite and reported 14-23lb wt loss PTA. Reports decreased appetite, early satiety and swelling began 1-2 months ago.   Eating % meals since admit and willing to trial Boost daily for additional nutrition. Reports UBW around 135-145lbs, current wt ~170lbs likely s/t fluid. On diuretics, anticipate decrease in wt during hospital stay. Magnesium being replaced.     Good po intake of meals. Denies any GI complaints at this time. Last BM today.     Anthropometrics    Height: 5' 11" (180.3 cm) Height Method: Stated  Weight: 64.5 kg (142 lb 3.2 oz) Weight Method: Standard Scale (standing)  BMI (Calculated): 19.8  BMI Classification: normal (BMI 18.5-24.9)  Ideal Body Weight (IBW), Male: 172 lb  % Ideal Body Weight, Male (lb): 78.49 %  Usual Body Weight (UBW), k.36 kg (135-145lb per pt)  % Usual Body Weight: 128.2  (usual weight provided by patient)    Wt Readings from Last 5 Encounters:   22 64.5 kg (142 lb 3.2 oz)     Weight Change(s) Since Admission:  Admit Weight: 61.2 kg (135 lb)    Estimated Needs    Weight Used For Calorie Calculations: 78.5 kg (173 lb 1 oz) (Dry UBW)  Energy Calorie Requirements (kcal): 1952kcals/d (MSJ x 1.2SF)  [facility]  Weight Used For Protein Calculations: 78.5 kg (173 lb 1 oz)  Protein Requirements: 94g/d (1.2g/kg)     Temp: 97.5 °F (36.4 °C)       Enteral Nutrition    Patient not receiving enteral nutrition at this time.    Parenteral Nutrition    Patient not receiving parenteral nutrition support at this time.    Evaluation of Received Nutrient Intake    Calories: meeting estimated needs  Protein: meeting estimated needs      Nutrition Diagnosis     PES: Malnutrition related to current medical condition as evidenced " by <75% nutritional needs >1 wk, physical evidence of muscle and fat loss, moderate to severe fluid retention. (improving)    Interventions/Goals     Intervention(s): collaboration with other providers  Goal: Meet greater than 75% of nutritional needs. (goal met)    Monitoring & Evaluation     Dietitian will monitor food and beverage intake, weight change, electrolyte and renal panel and food and nutrition knowledge skill.  Nutrition Risk/Follow-Up: moderate (follow-up in 3-5 days)

## 2022-08-19 NOTE — PROGRESS NOTES
[facility] - 3rd Floor Medical Telemetry  Pulmonary Critical Care Note    Patient Name: Harpreet Rodas  MRN: [id number]  Admission Date: 8/13/2022  Hospital Length of Stay: 6 days  Code Status: Full Code  Attending Provider: Ana Rosa Fry MD  Primary Care Provider: Primary Doctor No     Subjective:     HPI:   This is a 56-year-old male who was admitted to [facility] on 08/13/2022 for complaints of worsening shortness of breath which has been ongoing over the last several months left-sided chest pain, and significant swelling of his right arm and bilateral feet.  Patient has history of drinking and smoking quit approximately 2 months ago prior to that was drinking 4-5 40 oz beer a weekly.  Workup revealed significantly elevated BNP of 3691.5, troponin 0.134, and CT imaging of the chest with a left lower lobe pulmonary thrombus, large right pleural effusion, and emphysematous changes throughout the lungs.  Pulmonary is being consulted for consideration of thoracentesis.  Patient was initiated on Lovenox b.i.d. secondary to pulmonary emboli. TTE obtained.     Further conversation revealed patient had been experiencing difficulty breathing approximately 6 months ago after inhalation of a combination of (murratic acid, sulfuric acid, bleach) while at work as a . Has been utilizing albuterol inhaler x2-3/day. Tried Trilegy x14 days without much relief. Approximately 3 months ago he began experiencing swelling in his B/L lower extremities in addition to early satiety.  He denied any associated weight loss stating he has always been a very thin person or fever/night sweats.  Denies any prior blood clots in legs/arm/lungs or any known cancers.  Mother and brother both passed away from unspecified cancer.      Patient has been exposed to a variety of pulmonary irritants/carcinogenic agents as listed below;  - Tobacco Use (approximately x70 pack years; quit x3 months ago)  - Asbestos  (Approximately 10-15 years)  - Sandblasting  - Glenn dust  - Harsh chemicals (Murratic Acid, Sulfuric Acid, Bleach; without appropriate respirator use)    24 Hour Interval History:  NAEON. He continues on 2L NC, IV lasix (20mg q12h), and FD Lovenox at this time. Thoracentesis performed on () in which patient tolerated well w/ 1030 cc straw-colored fluid removal. CXR s/p thoracentesis w/ small right apical & lower right lateral pneumothoraces however etiology likely trapped lung and vitals remained wnl & patient in no acute distress. Patient feeling overall improved from a respiratory stand point although has mild intermittent cough which is non-productive and without any hemoptysis. Currently pending pleural fluid analysis at this time.     Past Medical History:   Diagnosis Date    COPD (chronic obstructive pulmonary disease)        Past Surgical History:   Procedure Laterality Date    MANDIBLE FRACTURE SURGERY         Social History     Socioeconomic History    Marital status:    Tobacco Use    Smoking status: Former Smoker     Packs/day: 2.00     Quit date: 2022     Years since quittin.1    Smokeless tobacco: Never Used   Substance and Sexual Activity    Alcohol use: Not Currently     Comment: quit 2 months ago    Drug use: Never       Current Outpatient Medications   Medication Instructions    albuterol sulfate (INV ALBUTEROL) 90 mcg inhalation Inhalation, As needed (PRN), Take one puff by mouth as directed by Physician.       Current Inpatient Medications   aspirin  81 mg Oral Daily    atorvastatin  40 mg Oral QHS    enoxaparin  80 mg Subcutaneous Q12H    famotidine  20 mg Oral BID    furosemide (LASIX) injection  20 mg Intravenous Q12H    magnesium oxide  400 mg Oral TID       ROS10 point ROS performed and negative other than stated above.      Objective:       Intake/Output Summary (Last 24 hours) at 2022 1335  Last data filed at 2022 0800  Gross per 24 hour    Intake 120 ml   Output 2525 ml   Net -2405 ml         Vital Signs (Most Recent):  Temp: 97.7 °F (36.5 °C) (08/19/22 1116)  Pulse: 93 (08/19/22 1116)  Resp: (!) 22 (08/19/22 1116)  BP: 109/67 (08/19/22 1116)  SpO2: 96 % (08/19/22 1116)  Body mass index is 19.83 kg/m².  Weight: 64.5 kg (142 lb 3.2 oz) Vital Signs (24h Range):  Temp:  [97.5 °F (36.4 °C)-98.5 °F (36.9 °C)] 97.7 °F (36.5 °C)  Pulse:  [81-96] 93  Resp:  [18-22] 22  SpO2:  [95 %-99 %] 96 %  BP: (108-120)/(61-82) 109/67     Physical Exam  Constitutional:       General: He is not in acute distress.     Appearance: He is ill-appearing. He is not toxic-appearing.      Comments: Pleasant, cachetic gentleman   HENT:      Head: Normocephalic and atraumatic.      Mouth/Throat:      Mouth: Mucous membranes are moist.      Pharynx: Oropharynx is clear. No oropharyngeal exudate or posterior oropharyngeal erythema.   Eyes:      General: No scleral icterus.  Cardiovascular:      Rate and Rhythm: Normal rate and regular rhythm.      Heart sounds: Normal heart sounds.   Pulmonary:      Effort: Pulmonary effort is normal.      Comments: Improved breath sounds throughout right lung fields on anterior auscultation s/p thoracentesis; mild Bibasilar crackles present (R>L)  Musculoskeletal:      Right lower leg: Edema present.      Left lower leg: Edema present.      Comments: 2+ edema to B/L LEs w/ dependent edema to T4-T5 B/L; Right arm with significant edema & tender to palpation though  improving.   Neurological:      General: No focal deficit present.      Mental Status: He is alert and oriented to person, place, and time.   Psychiatric:         Mood and Affect: Mood normal.         Behavior: Behavior normal.       Lines/Drains/Airways     Peripheral Intravenous Line  Duration                Peripheral IV - Single Lumen 08/14/22 2000 Left;Posterior Forearm 4 days                Significant Labs:    Lab Results   Component Value Date    WBC 9.1 08/19/2022    HGB 11.9 (L)  08/19/2022    HCT 39.8 (L) 08/19/2022    .5 (H) 08/19/2022     08/19/2022   BNP of 3691.5, troponin 0.134      BMP  Lab Results   Component Value Date     08/19/2022    K 3.6 08/19/2022    CO2 43 (HH) 08/19/2022    BUN 14.2 08/19/2022    CREATININE 0.64 (L) 08/19/2022    CALCIUM 8.3 (L) 08/19/2022   Magnesium 1.5, phosphorus 5.7    ABG  Recent Labs   Lab 08/13/22  1247 08/16/22  0819   PH 7.302* 7.39   PO2 94 64*   PCO2 59.6* 90*   HCO3 29.4* 54.5   BE 3  --        Significant Imaging:  X-Ray Chest 1 View  Narrative: EXAMINATION:  XR CHEST 1 VIEW    CPT 33181    CLINICAL HISTORY:  Repeat CXR s/p prior w/ Small Apical Pneumothorax;    COMPARISON:  August 17, 2022    FINDINGS:  Cardiomediastinal silhouette improved parenchymal changes to be essentially unchanged as compared with previous exam persistent right-sided pneumothorax distance from apex to pleural reflection of approximately 3 cm there is a basilar component to the pneumothorax.    There is no other significant interval changes compared with the previous exam  Impression: Persistent right-sided pneumothorax with a basilar component hard to ascertain whether the basilar component might be related to an element of trapped lung.    No other significant change    Electronically signed by: Jeffreson Blackman  Date:    08/17/2022  Time:    15:37  X-Ray Chest 1 View  Narrative: EXAMINATION:  XR CHEST 1 VIEW    CLINICAL HISTORY:  S/p right thoracentesis; right pleural effusion; .    COMPARISON:  Chest x-ray 08/13/2022    FINDINGS:  Frontal view of the chest was obtained.  The cardiac silhouette is enlarged, grossly stable.  The aorta is partially calcified.  There is prominence and indistinctness of the central vasculature and monie which may reflect pulmonary vascular congestion.  Small bilateral pleural effusions are seen.  The right pleural effusion appears to have decreased in size, compatible with thoracentesis.  There appears to be small  right apical and lateral lower thoracic pneumothorax.  There are increased interstitial opacities in bilateral lungs which could reflect interstitial edema.  Bilateral basilar predominant airspace opacities are seen with mild improved aeration in the right lung base, possibly related to atelectasis, but underlying infiltrate cannot be excluded.  Calcified pleural plaques in the lower left thorax are again noted.  Thoracic dextroscoliosis is seen.  Impression: 1. The right pleural effusion appears to have decreased in size, compatible with thoracentesis.  There appears to be small right apical and lateral lower thoracic pneumothorax.  The findings are reported to to the patient's unit nurse, Brenden, on 8/17/2022 at approximately 12:16.  A secure epic message regarding the findings was also sent to Dr. Parnell at the same time.    Electronically signed by: Brando Hill MD  Date:    08/17/2022  Time:    12:18      Assessment/Plan:     Assessment  1. Left Lower Lobe Pulmonary Embolism  2. Large Right-sided pleural effusion w/ Atelectasis - improved s/p thoracentesis (8/17/22)  3. HFrEF (EF of 15%, 8/2022) w/ elevated BNP  4. Compensated Respiratory Acidosis w/ contraction alkalosis  5. RUE Edema  6. Hx of COPD (unquantified)  7. History of tobacco and alcohol abuse  8. Hx of Multiple Pulmonary Irritants as listed in above HPI  9. Cachectic w/ x3 months early satiety  10. Complaints of diarrhea on admit C diff pending - diarrhea resolved    Plan  - Continue O2 supplementation (currently 2L NC); wean as tolerated to maintain O2 sats >90%  - Thoracentesis performed (8/17/22); patient tolerated well   -- 1030 cc straw colored fluid removed   -- Pending pleural fluid studies for further diagnostics   -- Post-procedural CXR (x2) w/ right-sided trapped lung  - Continue FD Lovenox in setting of left pulmonary embolism  - HIV/Hepatitis Panel/Syphillis (all resulted negative)  - B/L LE & RUE U/S NIVA (negative)  - TTE w/ EF of  15% w/ diastolic dysfunction   -- Would continue diuresis as tolerated; currently Lasix 20mg Q12h   -- Strict I's & O's (- 16.7L since admission)  - Ordered CT Abdomen/Pelvis W & W/out Contrast for further evaluation of possible underlying malignancy in setting of weight loss, early satiety, and hypercoagulable stated w/ PE  - Further Recommendations per Pulmonologist    Polo Parnell MD   Internal Medicine PGY-II  Pulmonary Service    Attending Addendum to Follow

## 2022-08-19 NOTE — PT/OT/SLP PROGRESS
Occupational Therapy  Treatment    Harpreet Rodas   MRN: [id number]   Admitting Diagnosis: Anasarca    OT Date of Treatment: 08/19/22   OT Start Time: 1155  OT Stop Time: 1220  OT Total Time (min): 25 min     Billable Minutes:  Therapeutic Activity 25  Total Minutes: 25     OT/ALY: ALY     ALY Visit Number: 2    General Precautions: Standard, fall  Orthopedic Precautions:    Braces:      Spiritual, Cultural Beliefs, Quaker Practices, Values that Affect Care: no    Subjective:  Communicated with RN prior to session.     Pt. With HOB elevated upon entry. Pt. Seeming to have difficulty breathing as seen by work of breath increased. O2 sats checked and was 98%. RN notified and cleared patient to participate. Pt. Motivated to participate in tx session.    Objective:     Pt. Performing supine> sit t/f requiring SBA however pt. Unable to maintain 2/2 increased dizziness evident by pt. Visibly swaying and eyes crossed. Pt. Requiring total A into supine position. Pt. O2 sats monitored and desaturated to 88% however recovered quickly to 94%. Pt. BP taken at this time /64. Pt. Still with increased work of breath and hard time catching breath. Pt. Educated in pursed lip breathing.     Patient left HOB elevated with all lines intact, call button in reach and RN notified    ASSESSMENT:  Harpreet Rodas is a 56 y.o. male with a medical diagnosis of Anasarca and presents with difficulty breathing evident by Work of breath and general fatigue while using accessory muscles to help breathe. Pt. With decline in status compared to yesterday's therapy notes not appropriate skilled mobility due to status. RN notified. Will follow up as appropriate.    Rehab potential is poor    Activity tolerance: Poor    Discharge recommendations: nursing facility, skilled, nursing facility, basic     Equipment recommendations:       GOALS:   Multidisciplinary Problems     Occupational Therapy Goals        Problem: Occupational Therapy    Goal  Priority Disciplines Outcome Interventions   Occupational Therapy Goal     OT, PT/OT Ongoing, Progressing    Description: Goals to be met by: 8/29/22    Patient will increase functional independence with ADLs by performing:    LE Dressing with Stand-by Assistance.  Grooming while standing at sink with Stand-by Assistance.  Toileting from toilet with Stand-by Assistance for hygiene and clothing management.                      Plan:  Patient to be seen 3 x/week, 4 x/week, 5 x/week to address the above listed problems via self-care/home management, therapeutic activities, therapeutic exercises  Plan of Care expires: 08/29/22  Plan of Care reviewed with: patient         08/19/2022

## 2022-08-20 ENCOUNTER — HOSPITAL ENCOUNTER (OUTPATIENT)
Dept: RADIOLOGY | Facility: HOSPITAL | Age: 57
Discharge: HOME | End: 2022-08-20

## 2022-08-20 LAB
ANION GAP SERPL CALC-SCNC: 6 MEQ/L
BASOPHILS # BLD AUTO: 0.02 X10(3)/MCL (ref 0–0.2)
BASOPHILS NFR BLD AUTO: 0.2 %
BUN SERPL-MCNC: 11.7 MG/DL (ref 8.4–25.7)
CALCIUM SERPL-MCNC: 8.3 MG/DL (ref 8.4–10.2)
CHLORIDE SERPL-SCNC: 90 MMOL/L (ref 98–107)
CO2 SERPL-SCNC: 44 MMOL/L (ref 22–29)
CREAT SERPL-MCNC: 0.61 MG/DL (ref 0.73–1.18)
CREAT/UREA NIT SERPL: 19
EOSINOPHIL # BLD AUTO: 0.1 X10(3)/MCL (ref 0–0.9)
EOSINOPHIL NFR BLD AUTO: 1.1 %
ERYTHROCYTE [DISTWIDTH] IN BLOOD BY AUTOMATED COUNT: 13.4 % (ref 11.5–17)
GFR SERPLBLD CREATININE-BSD FMLA CKD-EPI: >60 MLS/MIN/1.73/M2
GLUCOSE SERPL-MCNC: 146 MG/DL (ref 74–100)
HCT VFR BLD AUTO: 34.2 % (ref 42–52)
HGB BLD-MCNC: 10.4 GM/DL (ref 14–18)
IMM GRANULOCYTES # BLD AUTO: 0.06 X10(3)/MCL (ref 0–0.04)
IMM GRANULOCYTES NFR BLD AUTO: 0.7 %
LYMPHOCYTES # BLD AUTO: 1.23 X10(3)/MCL (ref 0.6–4.6)
LYMPHOCYTES NFR BLD AUTO: 13.9 %
MCH RBC QN AUTO: 30.8 PG (ref 27–31)
MCHC RBC AUTO-ENTMCNC: 30.4 MG/DL (ref 33–36)
MCV RBC AUTO: 101.2 FL (ref 80–94)
MONOCYTES # BLD AUTO: 0.68 X10(3)/MCL (ref 0.1–1.3)
MONOCYTES NFR BLD AUTO: 7.7 %
NEUTROPHILS # BLD AUTO: 6.8 X10(3)/MCL (ref 2.1–9.2)
NEUTROPHILS NFR BLD AUTO: 76.4 %
NRBC BLD AUTO-RTO: 0 %
PLATELET # BLD AUTO: 155 X10(3)/MCL (ref 130–400)
PMV BLD AUTO: 10.1 FL (ref 7.4–10.4)
POTASSIUM SERPL-SCNC: 4.1 MMOL/L (ref 3.5–5.1)
RBC # BLD AUTO: 3.38 X10(6)/MCL (ref 4.7–6.1)
SODIUM SERPL-SCNC: 140 MMOL/L (ref 136–145)
WBC # SPEC AUTO: 8.9 X10(3)/MCL (ref 4.5–11.5)

## 2022-08-20 PROCEDURE — 63600175 PHARM REV CODE 636 W HCPCS: Performed by: INTERNAL MEDICINE

## 2022-08-20 PROCEDURE — 25000003 PHARM REV CODE 250: Performed by: NURSE PRACTITIONER

## 2022-08-20 PROCEDURE — 21400001 HC TELEMETRY ROOM

## 2022-08-20 PROCEDURE — 85025 COMPLETE CBC W/AUTO DIFF WBC: CPT | Performed by: NURSE PRACTITIONER

## 2022-08-20 PROCEDURE — 25000003 PHARM REV CODE 250: Performed by: INTERNAL MEDICINE

## 2022-08-20 PROCEDURE — 71045 X-RAY EXAM CHEST 1 VIEW: CPT | Mod: TC

## 2022-08-20 PROCEDURE — 80048 BASIC METABOLIC PNL TOTAL CA: CPT | Performed by: NURSE PRACTITIONER

## 2022-08-20 PROCEDURE — 36415 COLL VENOUS BLD VENIPUNCTURE: CPT | Performed by: NURSE PRACTITIONER

## 2022-08-20 RX ORDER — METOLAZONE 5 MG/1
5 TABLET ORAL DAILY
Status: DISCONTINUED | OUTPATIENT
Start: 2022-08-20 | End: 2022-08-21

## 2022-08-20 RX ADMIN — METOLAZONE 5 MG: 5 TABLET ORAL at 01:08

## 2022-08-20 RX ADMIN — Medication 400 MG: at 03:08

## 2022-08-20 RX ADMIN — ASPIRIN 81 MG CHEWABLE TABLET 81 MG: 81 TABLET CHEWABLE at 08:08

## 2022-08-20 RX ADMIN — FUROSEMIDE 20 MG: 10 INJECTION, SOLUTION INTRAMUSCULAR; INTRAVENOUS at 04:08

## 2022-08-20 RX ADMIN — TRAZODONE HYDROCHLORIDE 50 MG: 50 TABLET ORAL at 09:08

## 2022-08-20 RX ADMIN — FAMOTIDINE 20 MG: 20 TABLET, FILM COATED ORAL at 09:08

## 2022-08-20 RX ADMIN — FAMOTIDINE 20 MG: 20 TABLET, FILM COATED ORAL at 08:08

## 2022-08-20 RX ADMIN — Medication 400 MG: at 09:08

## 2022-08-20 RX ADMIN — Medication 650 MG: at 01:08

## 2022-08-20 RX ADMIN — ATORVASTATIN CALCIUM 40 MG: 40 TABLET, FILM COATED ORAL at 09:08

## 2022-08-20 RX ADMIN — ENOXAPARIN SODIUM 80 MG: 80 INJECTION SUBCUTANEOUS at 04:08

## 2022-08-20 RX ADMIN — ENOXAPARIN SODIUM 80 MG: 80 INJECTION SUBCUTANEOUS at 05:08

## 2022-08-20 RX ADMIN — FUROSEMIDE 20 MG: 10 INJECTION, SOLUTION INTRAMUSCULAR; INTRAVENOUS at 05:08

## 2022-08-20 RX ADMIN — Medication 400 MG: at 08:08

## 2022-08-20 NOTE — PROGRESS NOTES
[facility] - 3rd Floor Medical Telemetry  Cardiology  Progress Note    Patient Name: Harpreet Rodas  MRN: [id number]  Admission Date: 8/13/2022  Hospital Length of Stay: 7 days  Code Status: Full Code   Attending Provider: Ana Rosa Fry MD   Consulting Provider: ART De Oliveira  Primary Care Physician: Primary Doctor No  Principal Problem:Anasarca    Patient information was obtained from patient and ER records.     Subjective:     Chief Complaint:       HPI:   Mr. Mayen is a 55y/o male, unknown to [facility], with PMHx significant for COPD and previous heavy smoker/drinker who presented to [facility] via EMS for c/o SOB, MON, orthopnea, peripheral edema, diarrhea, and chest pain described as sticking sensation. No radiation or associated symptoms.  RA sat upon EMS arrival-88% and he was placed on Bipap. In ED, workup significant for BUN/creatinine 45/1.36, K 6.1, D-dimer 3.05, BNP 3280, Troponin 0.134-0.120-0.105. CXR revealing bilateral pleural effusions. Right> left with possible RLL opacification. CTA chest obtained LLL PE, large right pleural effusion, and findings of anasarca. EF revealing EF 15%, Grade III LVDD and RA/RV enlargement. He was placed on diuretics with consult placed to [facility] for PVC and 2nd degree Type II AVB. Review of tele reveals SR with non-conducted PACs. No 2nd degree heart block noted.     Hospital Course:  8/16/22: Patient awake in bed. Good diuresis with Lasix. Still volume overloaded.   8/17/22: Patient awake in bed. Continues to require O2 support. S/p right sided thoracentesis with reported removal of 1030 L straw colored fluid. CO2 rising. Patient started on diamox. Renal indices stable. -5.5L UOP x 24hr  8/18/22: Patient sitting up in bedside chair. Reports SOB and peripheral edema improving.   8/19/22: Patient awake in bed. NAD. Diminished breath sounds.   8/20/22: Patient awake in bed. NAD. Still orthopneic. Diminished breath sounds to Right lung fields. Edema continues  to recede.     PMH: COPD  PSH: mandible fx surgery,  Family History: Mother- cancer; brother- cancer  Social History: previous heavy smoker (2ppd)/drinker- quit 6/22    Previous Cardiac Diagnostics:   Venous US BUE 08/14/22:  No evidence of deep or superficial vein thrombosis in bilateral upper extremities.     Venous US BLE 08/14/22:  Negative DVT to BLE    TTE 08/13/22:  Severely decreased Systolic function. Estimated EF 15%. Grade III LVDD. There is pulmonary HTN, Mild TR. Moderate RV enlargement. Moderate RA enlargement. Intermediate CVP 8 mmHg. Estimated PASP 50mmHg    Review of Systems   Constitutional: Positive for activity change and fatigue.   Respiratory: Positive for shortness of breath.    Cardiovascular: Positive for leg swelling. Negative for chest pain.   Gastrointestinal: Positive for abdominal distention.   Genitourinary: Negative for scrotal swelling.   Neurological: Negative.    Psychiatric/Behavioral: Negative.        Objective:     Vital Signs (Most Recent):  Temp: 98.4 °F (36.9 °C) (08/20/22 0730)  Pulse: 98 (08/20/22 0731)  Resp: 18 (08/20/22 0730)  BP: 110/65 (08/20/22 0730)  SpO2: 95 % (08/20/22 0731) Vital Signs (24h Range):  Temp:  [97.5 °F (36.4 °C)-98.4 °F (36.9 °C)] 98.4 °F (36.9 °C)  Pulse:  [] 98  Resp:  [18-22] 18  SpO2:  [89 %-97 %] 95 %  BP: (109-117)/(65-78) 110/65     Weight: 64.5 kg (142 lb 3.2 oz)  Body mass index is 19.83 kg/m².    SpO2: 95 %  O2 Device (Oxygen Therapy): nasal cannula w/ humidification      Intake/Output Summary (Last 24 hours) at 8/20/2022 0848  Last data filed at 8/20/2022 0000  Gross per 24 hour   Intake 1320 ml   Output 1725 ml   Net -405 ml       Lines/Drains/Airways     Peripheral Intravenous Line  Duration                Peripheral IV - Single Lumen 08/14/22 2000 Left;Posterior Forearm 5 days                Significant Labs:  Recent Results (from the past 72 hour(s))   Albumin    Collection Time: 08/17/22 10:22 AM   Result Value Ref Range     Albumin Level 2.5 (L) 3.5 - 5.0 gm/dL   Lactate Dehydrogenase    Collection Time: 08/17/22 10:22 AM   Result Value Ref Range    Lactate Dehydrogenase 267 (H) 125 - 220 U/L   Cell Count, Body Fluid    Collection Time: 08/17/22 10:52 AM   Result Value Ref Range    WBC  /uL   Gram Stain    Collection Time: 08/17/22 10:52 AM    Specimen: Pleural Fluid; Body Fluid   Result Value Ref Range    GRAM STAIN No WBCs, No bacteria seen     Body Fluid Culture    Collection Time: 08/17/22 10:52 AM    Specimen: Thoracentesis Fluid; Body Fluid   Result Value Ref Range    Body Fluid Culture No Growth At 48 Hours    Differential, Body Fluid    Collection Time: 08/17/22 10:52 AM   Result Value Ref Range    Neutrophils % 22 %    Lymphocyte % 76 %    Monocyte % 2 %    Mesothelial count 3    Glucose, Random    Collection Time: 08/17/22 10:00 PM   Result Value Ref Range    CBG 98    Glucose, Random    Collection Time: 08/18/22 12:00 AM   Result Value Ref Range    CBG 86    Basic Metabolic Panel    Collection Time: 08/18/22  4:19 AM   Result Value Ref Range    Sodium Level 138 136 - 145 mmol/L    Potassium Level 3.8 3.5 - 5.1 mmol/L    Chloride 89 (L) 98 - 107 mmol/L    Carbon Dioxide 39 (H) 22 - 29 mmol/L    Glucose Level 96 74 - 100 mg/dL    Blood Urea Nitrogen 16.0 8.4 - 25.7 mg/dL    Creatinine 0.69 (L) 0.73 - 1.18 mg/dL    BUN/Creatinine Ratio 23     Calcium Level Total 8.0 (L) 8.4 - 10.2 mg/dL    Anion Gap 10.0 mEq/L    eGFR >60 mls/min/1.73/m2   Phosphorus    Collection Time: 08/18/22  4:19 AM   Result Value Ref Range    Phosphorus Level 2.7 2.3 - 4.7 mg/dL   Magnesium    Collection Time: 08/18/22  4:19 AM   Result Value Ref Range    Magnesium Level 2.00 1.60 - 2.60 mg/dL   CBC with Differential    Collection Time: 08/18/22  4:19 AM   Result Value Ref Range    WBC 8.1 4.5 - 11.5 x10(3)/mcL    RBC 4.02 (L) 4.70 - 6.10 x10(6)/mcL    Hgb 12.2 (L) 14.0 - 18.0 gm/dL    Hct 40.4 (L) 42.0 - 52.0 %    .5 (H) 80.0 - 94.0 fL     MCH 30.3 27.0 - 31.0 pg    MCHC 30.2 (L) 33.0 - 36.0 mg/dL    RDW 13.2 11.5 - 17.0 %    Platelet 131 130 - 400 x10(3)/mcL    MPV 10.3 7.4 - 10.4 fL    Neut % 71.0 %    Lymph % 16.7 %    Mono % 10.1 %    Eos % 1.4 %    Basophil % 0.4 %    Lymph # 1.36 0.6 - 4.6 x10(3)/mcL    Neut # 5.8 2.1 - 9.2 x10(3)/mcL    Mono # 0.82 0.1 - 1.3 x10(3)/mcL    Eos # 0.11 0 - 0.9 x10(3)/mcL    Baso # 0.03 0 - 0.2 x10(3)/mcL    IG# 0.03 0 - 0.04 x10(3)/mcL    IG% 0.4 %    NRBC% 0.0 %   POCT glucose    Collection Time: 08/18/22  4:29 AM   Result Value Ref Range    POCT Glucose 81 70 - 110 mg/dL   Comprehensive Metabolic Panel    Collection Time: 08/19/22  6:47 AM   Result Value Ref Range    Sodium Level 139 136 - 145 mmol/L    Potassium Level 3.6 3.5 - 5.1 mmol/L    Chloride 89 (L) 98 - 107 mmol/L    Carbon Dioxide 43 (HH) 22 - 29 mmol/L    Glucose Level 83 74 - 100 mg/dL    Blood Urea Nitrogen 14.2 8.4 - 25.7 mg/dL    Creatinine 0.64 (L) 0.73 - 1.18 mg/dL    Calcium Level Total 8.3 (L) 8.4 - 10.2 mg/dL    Protein Total 5.7 (L) 6.4 - 8.3 gm/dL    Albumin Level 2.8 (L) 3.5 - 5.0 gm/dL    Globulin 2.9 2.4 - 3.5 gm/dL    Albumin/Globulin Ratio 1.0 (L) 1.1 - 2.0 ratio    Bilirubin Total 1.0 <=1.5 mg/dL    Alkaline Phosphatase 93 40 - 150 unit/L    Alanine Aminotransferase 33 0 - 55 unit/L    Aspartate Aminotransferase 45 (H) 5 - 34 unit/L    eGFR >60 mls/min/1.73/m2   CBC with Differential    Collection Time: 08/19/22  6:47 AM   Result Value Ref Range    WBC 9.1 4.5 - 11.5 x10(3)/mcL    RBC 3.92 (L) 4.70 - 6.10 x10(6)/mcL    Hgb 11.9 (L) 14.0 - 18.0 gm/dL    Hct 39.8 (L) 42.0 - 52.0 %    .5 (H) 80.0 - 94.0 fL    MCH 30.4 27.0 - 31.0 pg    MCHC 29.9 (L) 33.0 - 36.0 mg/dL    RDW 13.4 11.5 - 17.0 %    Platelet 142 130 - 400 x10(3)/mcL    MPV 9.8 7.4 - 10.4 fL    Neut % 72.8 %    Lymph % 15.2 %    Mono % 10.2 %    Eos % 1.2 %    Basophil % 0.3 %    Lymph # 1.38 0.6 - 4.6 x10(3)/mcL    Neut # 6.6 2.1 - 9.2 x10(3)/mcL    Mono # 0.92 0.1 -  1.3 x10(3)/mcL    Eos # 0.11 0 - 0.9 x10(3)/mcL    Baso # 0.03 0 - 0.2 x10(3)/mcL    IG# 0.03 0 - 0.04 x10(3)/mcL    IG% 0.3 %    NRBC% 0.0 %       Significant Imaging:  Imaging Results          CTA Chest Abdomen Non Coronary (Final result)  Result time 08/13/22 15:55:52   Procedure changed from CTA Chest Non-Coronary (PE Study)     Final result by Basilio De Oliveira MD (08/13/22 15:55:52)                 Impression:      Left lower lobe pulmonary thromboembolism is noted.    Large right pleural effusion is present.    Findings of anasarca.    The bladder wall is prominent.  Correlate with urinalysis.    There are cortical defects of the bilateral kidneys likely related to chronic renal disease and scarring, however infarcts are less likely.    The liver appears heterogeneous which may be related to phase of contrast however is cirrhosis is not excluded.  Cardiomegaly is present.  Further evaluation may be obtained with ECHO.    Findings reported to Dr. Little prior to interpretation.      Electronically signed by: Basilio De Oliveira  Date:    08/13/2022  Time:    15:55             Narrative:    EXAMINATION:  CTA CHEST ABDOMEN NON CORONARY (XPD)    CLINICAL HISTORY:  Pulmonary embolism (PE) suspected, positive D-dimer;    TECHNIQUE:  Axial CTA images of the chest, abdomen, and pelvis were obtained With Contrast. Sagittal and coronal reconstructed images were available for review.    Automatic exposure control was utilized to reduce the patient's radiation dose.    DLP = 582    COMPARISON:  No prior images available for comparison.    FINDINGS:  PULMONARY ARTERY: Thrombus is identified in the left lower lobe pulmonary artery.    AORTA: The thoracoabdominal aorta is normal in course and caliber. Scattered atherosclerotic disease is noted.    HEART: The heart is enlarged.  No pericardial effusion.    THYROID GLAND: The thyroid is not enlarged. There are no nodules identified.    AIRWAYS: Trachea is midline and  tracheobronchial tree is patent.    LUNGS: Large right effusion with subsegmental atelectatic changes at the right base.  Emphysematous changes throughout the lungs.    THROACIC LYMPH NODES: There is no significant mediastinal, axillary or hilar lymphadenopathy.    HEPATOBILIARY: Somewhat nodular contour of the superior aspect of the liver with some heterogeneity may be related to phase of contrast versus cirrhosis.  Correlate with patient's history.  The gallbladder is normal.    SPLEEN: Normal    PANCREAS: No focal masses or ductal dilatation.    ADRENALS: No adrenal nodules.    KIDNEYS: No evidence of hydronephrosis.  Bilateral renal cortical perfusional defects likely related to scarring in chronic kidney disease.  Correlate with patient's history.  Less likely infarcts.    ABDOMINAL LYMPHADENOPATHY/RETROPERITONEUM: There is no retroperitoneal lymphadenopathy.    BOWEL: No acute bowel related abnormalities.    PELVIC VISCERA: The bladder wall is somewhat prominent.  Correlate with urinalysis.    PELVIC LYMPH NODES: No lymphadenopathy.    PERITONEUM/ BODY WALL: Diffuse body wall edema with moderate ascites noted.    SKELETAL: No aggressive appearing lytic/blastic lesion. No acute fractures, subluxations or dislocations.                               X-Ray Chest 1 View (Final result)  Result time 08/13/22 12:44:56    Final result by Basilio De Oliveira MD (08/13/22 12:44:56)                 Impression:      Right greater than left pleural effusion with possible right lower lobe opacification.  Underlying infectious process is not excluded.  Recommend continued follow-up.      Electronically signed by: Basilio De Oliveira  Date:    08/13/2022  Time:    12:44             Narrative:    EXAMINATION:  XR CHEST 1 VIEW    CLINICAL HISTORY:  shortness of breath;    TECHNIQUE:  Single view of the chest    COMPARISON:  No prior imaging available for comparison.    FINDINGS:  Right greater than left pleural effusion with possible  right lower lobe opacification.  Underlying infectious process is not excluded.  Recommend continued follow-up.                              Telemetry:          Physical Exam  HENT:      Mouth/Throat:      Mouth: Mucous membranes are moist.      Comments: Poor dentition  Cardiovascular:      Rate and Rhythm: Normal rate and regular rhythm.      Heart sounds: Normal heart sounds.   Pulmonary:      Breath sounds: Rales present.      Comments: Diminished breath sounds to right lung fields  Abdominal:      Palpations: Abdomen is soft.   Musculoskeletal:      Right lower leg: Edema present.      Left lower leg: Edema present.   Skin:     General: Skin is warm.   Neurological:      General: No focal deficit present.      Mental Status: He is alert.   Psychiatric:         Mood and Affect: Mood normal.         Home Medications:   No current facility-administered medications on file prior to encounter.     Current Outpatient Medications on File Prior to Encounter   Medication Sig Dispense Refill    albuterol sulfate (INV ALBUTEROL) 90 mcg inhalation Inhale into the lungs as needed. Take one puff by mouth as directed by Physician.         Current Inpatient Medications:    Current Facility-Administered Medications:     acetaminophen tablet 650 mg, 650 mg, Oral, Q4H PRN, Reginald Barker MD, 650 mg at 08/16/22 2222    albuterol-ipratropium 2.5 mg-0.5 mg/3 mL nebulizer solution 3 mL, 3 mL, Nebulization, Q4H PRN, Reginald Barker MD    aspirin chewable tablet 81 mg, 81 mg, Oral, Daily, Jaylin Grissom, FNP, 81 mg at 08/20/22 0840    atorvastatin tablet 40 mg, 40 mg, Oral, QHS, Kwasi Thao MD, 40 mg at 08/19/22 2000    enoxaparin injection 80 mg, 80 mg, Subcutaneous, Q12H, Kwasi Thao MD, 80 mg at 08/20/22 0441    famotidine tablet 20 mg, 20 mg, Oral, BID, Reginald Barker MD, 20 mg at 08/20/22 0841    furosemide injection 20 mg, 20 mg, Intravenous, Q12H, Kwasi Thao MD, 20 mg at 08/20/22 0441    magnesium oxide  tablet 400 mg, 400 mg, Oral, TID, Kwasi Thao MD, 400 mg at 08/20/22 0840    melatonin tablet 6 mg, 6 mg, Oral, Nightly PRN, Reginald Barker MD    promethazine tablet 25 mg, 25 mg, Oral, Q6H PRN, Reginald Barker MD    sodium chloride 0.9% flush 10 mL, 10 mL, Intravenous, PRN, Yuni Little MD    sodium chloride 0.9% flush 10 mL, 10 mL, Intravenous, PRN, Reginald Barker MD    traZODone tablet 50 mg, 50 mg, Oral, Nightly PRN, Alan Daigle MD, 50 mg at 08/19/22 2000         VTE Risk Mitigation (From admission, onward)         Ordered     enoxaparin injection 80 mg  Every 12 hours (non-standard times)         08/15/22 1314     IP VTE HIGH RISK PATIENT  Once         08/13/22 1523     Place sequential compression device  Until discontinued         08/13/22 1523     Place GIACOMO hose  Until discontinued         08/13/22 1523                Assessment:   CMO, unspecified  --EF 15%  Acute combined systolic and diastolic HF  --EF 15%; Grade III LVDD  --BNP 3280  --s/p right thoracentesis with 1030 fluid removal' f/u x-ray revealing small apical pneumothorax ? Element of trapped lung  NSTEMI, Type II s/t heart failure  --Troponin 0.134->0.120->0.105  Left BBB  LLL PE  ANNETTA  --improving with diuresis  COPD  Former smoker  No hx GIB    Plan:   Excellent UOP. Still overloaded. Xray has worsened. Continue  Lasix 20 mg IVP bid. Add metolazone 5 mg daily.  Start GDMT once closer to euvolemia  Ensure accurate I&O's/levar ly weights  Troponin elevation with flat trend likely s/t CMO. Will plan for coronary evaluation once euvolemic pending stable renal function.   Continue aspirin, atorvastatin, and weight based lovenox bid.  Lifevest will be needed prior to DC- order placed to case management  No indication for mechanical thrombectomy as patient is hemodynamically stable. Continue weight based Lovenox with plans to transition to DOAC prior to DC.         Jaylin Grissom, P  Cardiology  [facility] - 3rd  [facility] Medical Telemetry  08/20/2022

## 2022-08-20 NOTE — PROGRESS NOTES
"[facility]  HOSPITAL MEDICINE PROGRESS NOTE      Patient Name: Harpreet Rodas  MRN: [id number]  Admission Date: 8/13/2022 12:32 PM  Status: IP- Inpatient   Length of Stay: 7 days  Face-to-Face encounter date: 08/20/2022       CHIEF COMPLAINT  Follow-up on respiratory failure and congestive heart failure      HOSPITAL COURSE  Patient is a 56-year-old white male with a past medical history of chronic obstructive pulmonary disease. The patient presented to [facility] on 8/13/2022 with a primary complaint of dyspnea on exertion, edema and chest pain.      Patient reports that for about 6 months he has had worsening dyspnea on exertion. Currently, just walking in his living room make him extremely short of breath. He started having swelling of his right arm and both legs 3 days ago. He started having diarrhea at the same time. He has lost weight. He says he gets hungry but feels full after a few bites and the food goes right through him. He came to the ER he began having left side chest pain, "a sticking sensation". He denies associated symptoms and it only lasts a short time. No radiation. No known history of CAD. Denies fever, chills or sweats. He quit smoking and drinking about 2 months ago after his wife had pneumonia and his breathing was worsening. He was drinking 4-5 40oz beers a week prior to quitting. The pt became very short of breath when trying to get into wheelchair for chest CT. However, he was able to tolerate CT and lay flat when transported by bed. He was given Lasix in the ED. He says he is feeling better. He is not on home oxygen. Only home med is an inhaler. Echocardiogram showed LVEF 15%, grade III left ventricular diastolic dysfunction, pulmonary hypertension, mild tricuspid regurgitation, moderate right atrial enlargement.    Interval history:  Patient is asleep in bed with no acute issues reported. He is afebrile, hemodynamically stable and on supplemental oxygen at 2 " liters/minute via nasal cannula.    OBJECTIVE    VITAL SIGNS: 24 HRS MIN & MAX LAST   Temp  Min: 97.5 °F (36.4 °C)  Max: 98.4 °F (36.9 °C) 97.8 °F (36.6 °C)   BP  Min: 110/65  Max: 150/75 (!) 150/75   Pulse  Min: 89  Max: 107  105   Resp  Min: 18  Max: 18 18   SpO2  Min: 89 %  Max: 97 % (!) 91 %       LABS/MICROBIOLOGY/MEDICATIONS/DIAGNOSTICS  I have reviewed all pertinent lab results within the past 24 hours.    Recent Labs   Lab 08/18/22  0419 08/19/22  0647 08/20/22  0905   WBC 8.1 9.1 8.9   RBC 4.02* 3.92* 3.38*   HGB 12.2* 11.9* 10.4*   HCT 40.4* 39.8* 34.2*   .5* 101.5* 101.2*   MCH 30.3 30.4 30.8   MCHC 30.2* 29.9* 30.4*   RDW 13.2 13.4 13.4    142 155   MPV 10.3 9.8 10.1       Recent Labs   Lab 08/14/22  0658 08/15/22  0330 08/16/22  0353 08/16/22  0819 08/17/22  0537 08/17/22  1022 08/18/22  0419 08/19/22  0647 08/20/22  0905    141 140  --  139  --  138 139 140   K 5.2* 3.5 3.2*  --  3.8  --  3.8 3.6 4.1   CO2 33* 37* 41*  --  43*  --  39* 43* 44*   BUN 43.2* 38.1* 26.2*  --  19.0  --  16.0 14.2 11.7   CREATININE 1.09 0.86 0.70*  --  0.71*  --  0.69* 0.64* 0.61*   CALCIUM 8.6 8.0* 7.8*  --  7.7*  --  8.0* 8.3* 8.3*   PH  --   --   --  7.39  --   --   --   --   --    MG 1.50* 1.50* 1.50*  --  1.90  --  2.00  --   --    ALBUMIN 2.6* 2.6*  --   --   --  2.5*  --  2.8*  --    ALKPHOS 69 86  --   --   --   --   --  93  --    ALT 21 23  --   --   --   --   --  33  --    AST 25 25  --   --   --   --   --  45*  --    BILITOT 0.7 0.5  --   --   --   --   --  1.0  --        Recent Labs     08/19/22  0647 08/20/22  0905   WBC 9.1 8.9   RBC 3.92* 3.38*   HGB 11.9* 10.4*   HCT 39.8* 34.2*   .5* 101.2*   MCH 30.4 30.8   MCHC 29.9* 30.4*   RDW 13.4 13.4     Recent Labs     08/19/22  0647 08/20/22  0905   CHLORIDE 89* 90*   CO2 43* 44*   BUN 14.2 11.7   CREATININE 0.64* 0.61*   GLUCOSE 83 146*   CALCIUM 8.3* 8.3*   ALBUMIN 2.8*  --    ALKPHOS 93  --    ALT 33  --    AST 45*  --         Microbiology Results (last 7 days)     Procedure Component Value Units Date/Time    Body Fluid Culture [[id number]] Collected: 08/17/22 1052    Order Status: Completed Specimen: Body Fluid from Thoracentesis Fluid Updated: 08/20/22 0849     Body Fluid Culture No Growth At 72 Hours    Blood Culture [[id number]]  (Normal) Collected: 08/13/22 1314    Order Status: Completed Specimen: Blood from Arm, Left Updated: 08/18/22 2102     CULTURE, BLOOD ([facility]) No Growth at 5 days    Blood Culture [[id number]]  (Normal) Collected: 08/13/22 1314    Order Status: Completed Specimen: Blood from Hand, Left Updated: 08/18/22 2102     CULTURE, BLOOD ([facility]) No Growth at 5 days    Gram Stain [[id number]] Collected: 08/17/22 1052    Order Status: Completed Specimen: Body Fluid from Pleural Fluid Updated: 08/18/22 0745     GRAM STAIN No WBCs, No bacteria seen     Gram Stain [[id number]] Collected: 08/17/22 1052    Order Status: Canceled Specimen: Thoracentesis Fluid Updated: 08/17/22 1242    Fungal Culture [[id number]] Collected: 08/17/22 1052    Order Status: Canceled Specimen: Thoracentesis Fluid Updated: 08/17/22 1135    Fungal Culture [[id number]] Collected: 08/17/22 1052    Order Status: Sent Specimen: Body Fluid from Pleural Fluid Updated: 08/17/22 1132    Mycobacteria and Nocardia Culture [[id number]] Collected: 08/17/22 1052    Order Status: Canceled Specimen: Body Fluid from Thoracentesis Fluid Updated: 08/17/22 1132    Mycobacteria and Nocardia Culture [[id number]] Collected: 08/17/22 1052    Order Status: Sent Specimen: Body Fluid from Thoracentesis Fluid Updated: 08/17/22 1131    Respiratory Culture [[id number]]     Order Status: Sent Specimen: Sputum, Expectorated     Urine culture [[id number]] Collected: 08/13/22 1254    Order Status: Completed Specimen: Urine, Clean Catch Updated: 08/15/22 0958     Urine Culture No Growth    Clostridium Diff Toxin, A & B, EIA [[id number]] Collected: 08/13/22 2310    Order Status: Sent Specimen:  Stool     Stool Culture **CANNOT BE ORDERED STAT** [[id number]]     Order Status: Sent Specimen: Stool            X-Ray Chest 1 View  Narrative: EXAMINATION:  XR CHEST 1 VIEW    CLINICAL HISTORY:  pneumothorax monitoring;    TECHNIQUE:  Single frontal view of the chest was performed.    COMPARISON:  08/17/2022    FINDINGS:  LINES AND TUBES: EKG/telemetry leads overlie the chest.    MEDIASTINUM AND REGINA: Cardiac silhouette is enlarged. EKG/telemetry leads overlie the chest.    LUNGS: Mild vascular congestion.  Basilar opacities.    PLEURA:There is a right hydropneumothorax.  Overall the combined volume of the pleural collection is similar to prior exam with increased fluid component and decreased pneumothorax component compared to prior exam.  Trace left pleural fluid.  Left pleural calcifications are chronic.    BONES: No acute osseous abnormality.  Impression: 1. Right hydropneumothorax.  Total volume of the collection is similar to prior exam.  Components however have shifted with apparent increased fluid component and decreased gas component compared to prior.  2. Enlarged cardiac silhouette with vascular congestion    Electronically signed by: Cielo Rust  Date:    08/20/2022  Time:    11:03  CT Abdomen Pelvis W Wo Contrast  Narrative: EXAMINATION:  CT ABDOMEN PELVIS W WO CONTRAST    CLINICAL HISTORY:  Intra-Abdominal Evaluation in setting of weight loss/early satiety & PMH;    TECHNIQUE:  Helically acquired images with axial, sagittal and coronal reformations were obtained from the lung bases to the pubic symphysis prior to and after the IV administration of contrast.    Automated tube current modulation, weight-based exposure dosing, and/or iterative reconstruction technique utilized to reach lowest reasonably achievable exposure rate.    DLP: 451 mGy*cm    COMPARISON:  CT chest and abdomen 08/13/2022, chest radiograph 08/17/2022    FINDINGS:  HEART: Cardiomegaly.    LUNG BASES: Known left lower lobe  pulmonary embolism.  Bilateral layering pleural effusions, moderate on the right, trace on the left.  Left pleural calcifications.  Small right pneumothorax.  Pulmonary emphysema basilar atelectasis.    LIVER: No appreciable focal hepatic lesion taking into account early timing of the contrast bolus.    BILIARY: No calcified gallstones.    PANCREAS: No inflammatory change.    SPLEEN: Normal in size    ADRENALS: No mass.    KIDNEYS/URETERS: Bilateral renal cortical scarring.  No renal calculus.  No hydronephrosis.    GI TRACT/MESENTERY:  No evidence of bowel obstruction or inflammation. The appendix is normal.    PERITONEUM: Small volume free intraperitoneal fluid.No free air.    LYMPH NODES: No enlarged lymph nodes by size criteria.    VASCULATURE: Aortic atherosclerosis.  Mesenteric arteries are patent.    BLADDER: Normal appearance given degree of distention.    REPRODUCTIVE ORGANS: Normal as visualized.    ABDOMINAL WALL: Body wall edema.  Foci of air at the anterior abdominal wall most compatible with medicinal injection.  Gas at the right chest wall.    BONES: No acute osseous abnormality.  Impression: 1. Anasarca.  There are pleural effusions, free intraperitoneal fluid and body wall edema  2. Right hydropneumothorax likely similar to prior chest radiograph taking into account differences in imaging modalities  3. Known left lower lobe pulmonary embolus  4. Left pleural calcifications  5. Cardiomegaly    Electronically signed by: Cielo Rust  Date:    08/20/2022  Time:    09:48        Scheduled Med:   aspirin  81 mg Oral Daily    atorvastatin  40 mg Oral QHS    enoxaparin  80 mg Subcutaneous Q12H    famotidine  20 mg Oral BID    furosemide (LASIX) injection  20 mg Intravenous Q12H    magnesium oxide  400 mg Oral TID    metOLazone  5 mg Oral Daily        Continuous Infusions:  None.    PRN Meds:  acetaminophen, albuterol-ipratropium, melatonin, promethazine, sodium chloride 0.9%, sodium chloride  0.9%, trazodone       PHYSICAL EXAM  General:  Cachectic chronically ill-appearing white male who appears much older than his stated age, in no acute distress with generalized edema  HENT: normocephalic, atraumatic, poor dentition  Eye: PERRL, EOMI, clear conjunctiva  Neck: full ROM, no thyromegaly  Respiratory:  Diminished breath sounds with bibasilar crackles  Cardiovascular: regular rate and rhythm, bilateral upper and lower extremity edema  Gastrointestinal: non-distended, positive bowel sounds, non-tender  Genitourinary: scrotal edema  Musculoskeletal: no gross deformity  Integumentary: warm, dry, intact, no rashes  Neurological: cranial nerves grossly intact, no focal neurological deficit  Psychiatric: cooperative      ASSESSMENT  Acute hypoxic respiratory failure  Acute combined systolic and diastolic congestive heart failure left ventricular ejection fraction of 15% and grade III left ventricular diastolic dysfunction  Left lower lobe acute pulmonary thromboembolism  Large right pleural effusion status post right thoracentesis 8/17/22 with 1030 mL straw colored fluid removed  Anasarca and ascites  Pulmonary hypertension  Probable hepatic cirrhosis with long history of alcohol abuse  COPD with emphysema with long history of tobacco abuse  Macrocytic anemia  Type 2 NSTEMI  Cachexia      PLAN  Continue supplemental oxygen, wean oxygen as tolerated, keep SaO2 greater than 90%  Assess need for home oxygen prior to discharge  Blood cultures and urine culture are thus far negative  Follow-up on pleural fluid studies  CIS continuing diuresis with intravenous Lasix 20 mg every 12 hours and replace electrolytes as needed  Continue ASA and statin  CIS to start GDMT when close to euvolemia  Will need Lifevest prior to discharge  Will need ischemic work up inpatient when euvolemic  Continue therapeutic dose Lovenox. Plan to switch to oral anticoagulation prior to discharge  Bilateral upper and lower extremity Doppler  ultrasounds were negative for deep or superficial vein thrombosis   HIV, Hepatitis panel, syphilis were all negative  Pulmonology has ordered a CT of the abdomen and pelvis with contrast as part of a malignancy evaluation  Patient has an extremely poor long-term prognosis.  Requested an evaluation from palliative medicine.    DVT Prophylaxis:  Full-dose Lovenox    Critical Care Diagnosis: Acute combined systolic congestive heart failure requiring IV diuretics; acute hypoxic respiratory failure requiring supplemental oxygen  Critical Care Interventions: Hands-on evaluation, review of labs, radiographs, medical records, and discussion with the patient and medical staff in order to assess and manage the high probability of imminent or life-threatening deterioration of cardio-respiratory status requiring vasopressor support and/or intubation and mechanical ventilation.  Critical Care Time Spent: 35 minutes      Patient condition:  Guarded with very poor long-term prognosis    Anticipated discharge and disposition: TBD  __________________________________________________________________________    All diagnosis and differential diagnosis have been reviewed; assessment and plan have been documented. I have personally reviewed the test results that are presently available; I have reviewed the patient's medication list. I have reviewed the consulting providers' recommendations. I have reviewed or attempted to review medical records based upon their availability.  All of the patient's questions have been addressed and answered. Patient is agreeable to the above stated plan. I will continue to monitor closely and make adjustment to medical management as needed.    This document was created with the assistance of a voice recognition software. There may be transcription errors as a result of using this technology, however minimal. Effort has been made to ensure accuracy of transcription but any obvious errors or omissions should  be clarified with the author of this document.        Jose Manuel Natarajan MD   [facility]  08/20/2022

## 2022-08-20 NOTE — PROGRESS NOTES
[facility] - 3rd Floor Medical Telemetry  Pulmonary Critical Care Note    Patient Name: Harpreet Rodas  MRN: [id number]  Admission Date: 8/13/2022  Hospital Length of Stay: 7 days  Code Status: Full Code  Attending Provider: Ana Rosa Fry MD  Primary Care Provider: Primary Doctor No     Subjective:     HPI:   This is a 56-year-old male who was admitted to [facility] on 08/13/2022 for complaints of worsening shortness of breath which has been ongoing over the last several months left-sided chest pain, and significant swelling of his right arm and bilateral feet.  Patient has history of drinking and smoking quit approximately 2 months ago prior to that was drinking 4-5 40 oz beer a weekly.  Workup revealed significantly elevated BNP of 3691.5, troponin 0.134, and CT imaging of the chest with a left lower lobe pulmonary thrombus, large right pleural effusion, and emphysematous changes throughout the lungs.  Pulmonary is being consulted for consideration of thoracentesis.  Patient was initiated on Lovenox b.i.d. secondary to pulmonary emboli. TTE obtained.     Further conversation revealed patient had been experiencing difficulty breathing approximately 6 months ago after inhalation of a combination of (murratic acid, sulfuric acid, bleach) while at work as a . Has been utilizing albuterol inhaler x2-3/day. Tried Trilegy x14 days without much relief. Approximately 3 months ago he began experiencing swelling in his B/L lower extremities in addition to early satiety.  He denied any associated weight loss stating he has always been a very thin person or fever/night sweats.  Denies any prior blood clots in legs/arm/lungs or any known cancers.  Mother and brother both passed away from unspecified cancer.      Patient has been exposed to a variety of pulmonary irritants/carcinogenic agents as listed below;  - Tobacco Use (approximately x70 pack years; quit x3 months ago)  - Asbestos  (Approximately 10-15 years)  - Sandblasting  - Glenn dust  - Harsh chemicals (Murratic Acid, Sulfuric Acid, Bleach; without appropriate respirator use)    Hospital Course/Significant events:  - Thoracentesis performed (22); patient tolerated well              -- 1030 cc straw colored fluid removed              -- Pending pleural fluid studies for further diagnostics              -- Post-procedural CXR (x2) w/ right-sided trapped lung  - Continue FD Lovenox in setting of left pulmonary embolism  - HIV/Hepatitis Panel/Syphillis (all resulted negative)  - B/L LE & RUE U/S NIVA (negative)  - TTE w/ EF of 15% w/ diastolic dysfunction    24 Hour Interval History:  Patient feeling overall improved from a respiratory stand point. He was able to expectorate some sputum this AM which was blood tinged. Currently pending pleural fluid analysis at this time. He continues on 2L NC, IV lasix (20mg q12h), and FD Lovenox at this time.    Past Medical History:   Diagnosis Date    COPD (chronic obstructive pulmonary disease)        Past Surgical History:   Procedure Laterality Date    MANDIBLE FRACTURE SURGERY         Social History     Socioeconomic History    Marital status:    Tobacco Use    Smoking status: Former Smoker     Packs/day: 2.00     Quit date: 2022     Years since quittin.1    Smokeless tobacco: Never Used   Substance and Sexual Activity    Alcohol use: Not Currently     Comment: quit 2 months ago    Drug use: Never       Current Outpatient Medications   Medication Instructions    albuterol sulfate (INV ALBUTEROL) 90 mcg inhalation Inhalation, As needed (PRN), Take one puff by mouth as directed by Physician.       Current Inpatient Medications   aspirin  81 mg Oral Daily    atorvastatin  40 mg Oral QHS    enoxaparin  80 mg Subcutaneous Q12H    famotidine  20 mg Oral BID    furosemide (LASIX) injection  20 mg Intravenous Q12H    magnesium oxide  400 mg Oral TID       Review of Systems    All other systems reviewed and are negative.        Objective:       Intake/Output Summary (Last 24 hours) at 8/20/2022 0937  Last data filed at 8/20/2022 0000  Gross per 24 hour   Intake 1320 ml   Output 1725 ml   Net -405 ml         Vital Signs (Most Recent):  Temp: 98.4 °F (36.9 °C) (08/20/22 0730)  Pulse: 98 (08/20/22 0731)  Resp: 18 (08/20/22 0730)  BP: 110/65 (08/20/22 0730)  SpO2: 95 % (08/20/22 0731)  Body mass index is 19.83 kg/m².  Weight: 64.5 kg (142 lb 3.2 oz) Vital Signs (24h Range):  Temp:  [97.5 °F (36.4 °C)-98.4 °F (36.9 °C)] 98.4 °F (36.9 °C)  Pulse:  [] 98  Resp:  [18-22] 18  SpO2:  [89 %-97 %] 95 %  BP: (109-117)/(65-78) 110/65     Physical Exam  Constitutional:       General: He is not in acute distress.     Appearance: He is ill-appearing. He is not toxic-appearing.      Comments: Pleasant, cachetic gentleman   HENT:      Head: Normocephalic and atraumatic.      Mouth/Throat:      Mouth: Mucous membranes are moist.      Pharynx: Oropharynx is clear. No oropharyngeal exudate or posterior oropharyngeal erythema.   Eyes:      General: No scleral icterus.  Cardiovascular:      Rate and Rhythm: Normal rate and regular rhythm.      Heart sounds: Normal heart sounds.   Pulmonary:      Effort: Pulmonary effort is normal.      Breath sounds: Decreased breath sounds present.   Musculoskeletal:      Right lower leg: Edema present.      Left lower leg: Edema present.      Comments: 2+ edema to BLE; Right arm with significant edema-  improving.   Neurological:      General: No focal deficit present.      Mental Status: He is alert and oriented to person, place, and time.   Psychiatric:         Mood and Affect: Mood normal.         Behavior: Behavior normal.       Lines/Drains/Airways     Peripheral Intravenous Line  Duration                Peripheral IV - Single Lumen 08/14/22 2000 Left;Posterior Forearm 5 days                Significant Labs:    Lab Results   Component Value Date    WBC 9.1  08/19/2022    HGB 11.9 (L) 08/19/2022    HCT 39.8 (L) 08/19/2022    .5 (H) 08/19/2022     08/19/2022   BNP of 3691.5, troponin 0.134      BMP  Lab Results   Component Value Date     08/19/2022    K 3.6 08/19/2022    CO2 43 (HH) 08/19/2022    BUN 14.2 08/19/2022    CREATININE 0.64 (L) 08/19/2022    CALCIUM 8.3 (L) 08/19/2022   Magnesium 1.5, phosphorus 5.7    ABG  Recent Labs   Lab 08/13/22  1247 08/16/22  0819   PH 7.302* 7.39   PO2 94 64*   PCO2 59.6* 90*   HCO3 29.4* 54.5   BE 3  --        Significant Imaging:  X-Ray Chest 1 View  Narrative: EXAMINATION:  XR CHEST 1 VIEW    CPT 73515    CLINICAL HISTORY:  Repeat CXR s/p prior w/ Small Apical Pneumothorax;    COMPARISON:  August 17, 2022    FINDINGS:  Cardiomediastinal silhouette improved parenchymal changes to be essentially unchanged as compared with previous exam persistent right-sided pneumothorax distance from apex to pleural reflection of approximately 3 cm there is a basilar component to the pneumothorax.    There is no other significant interval changes compared with the previous exam  Impression: Persistent right-sided pneumothorax with a basilar component hard to ascertain whether the basilar component might be related to an element of trapped lung.    No other significant change    Electronically signed by: Jefferson Blackman  Date:    08/17/2022  Time:    15:37  X-Ray Chest 1 View  Narrative: EXAMINATION:  XR CHEST 1 VIEW    CLINICAL HISTORY:  S/p right thoracentesis; right pleural effusion; .    COMPARISON:  Chest x-ray 08/13/2022    FINDINGS:  Frontal view of the chest was obtained.  The cardiac silhouette is enlarged, grossly stable.  The aorta is partially calcified.  There is prominence and indistinctness of the central vasculature and monie which may reflect pulmonary vascular congestion.  Small bilateral pleural effusions are seen.  The right pleural effusion appears to have decreased in size, compatible with thoracentesis.   There appears to be small right apical and lateral lower thoracic pneumothorax.  There are increased interstitial opacities in bilateral lungs which could reflect interstitial edema.  Bilateral basilar predominant airspace opacities are seen with mild improved aeration in the right lung base, possibly related to atelectasis, but underlying infiltrate cannot be excluded.  Calcified pleural plaques in the lower left thorax are again noted.  Thoracic dextroscoliosis is seen.  Impression: 1. The right pleural effusion appears to have decreased in size, compatible with thoracentesis.  There appears to be small right apical and lateral lower thoracic pneumothorax.  The findings are reported to to the patient's unit nurse, Brenden, on 8/17/2022 at approximately 12:16.  A secure epic message regarding the findings was also sent to Dr. Parnell at the same time.    Electronically signed by: Brando Hill MD  Date:    08/17/2022  Time:    12:18      Assessment/Plan:     Assessment  1. Left Lower Lobe Pulmonary Embolism  2. Large Right-sided pleural effusion w/ Atelectasis - improved s/p thoracentesis (8/17/22)  3. HFrEF (EF of 15%, 8/2022) w/ elevated BNP  4. Compensated Respiratory Acidosis w/ contraction alkalosis  5. RUE Edema  6. Hx of COPD (unquantified)  7. History of tobacco and alcohol abuse  8. Hx of Multiple Pulmonary Irritants as listed in above HPI  9. Cachectic w/ x3 months early satiety  10. Complaints of diarrhea on admit C diff pending - diarrhea resolved    Plan  - Continue O2 supplementation (currently 2L NC); wean as tolerated to maintain O2 sats >90%  - Thoracentesis performed (8/17/22)Pending pleural fluid studies for further diagnostics  - Continue FD Lovenox in setting of left pulmonary embolism  - Continue diuresis as tolerated; currently Lasix 20mg Q12h; recommendations per cardiology   -- Strict I's & O's   - CT Abdomen/Pelvis ordered yesterday ; Results pending  - Palliative care following as  damian.    Leighann Quinteros, FNP   Pulmonary Critical Care Medicine

## 2022-08-21 ENCOUNTER — HOSPITAL ENCOUNTER (OUTPATIENT)
Dept: RADIOLOGY | Facility: HOSPITAL | Age: 57
Discharge: HOME | End: 2022-08-21
Attending: INTERNAL MEDICINE

## 2022-08-21 LAB
ANION GAP SERPL CALC-SCNC: 5 MEQ/L
BASOPHILS # BLD AUTO: 0.02 X10(3)/MCL (ref 0–0.2)
BASOPHILS NFR BLD AUTO: 0.2 %
BUN SERPL-MCNC: 12.8 MG/DL (ref 8.4–25.7)
CALCIUM SERPL-MCNC: 8.3 MG/DL (ref 8.4–10.2)
CHLORIDE SERPL-SCNC: 87 MMOL/L (ref 98–107)
CO2 SERPL-SCNC: 44 MMOL/L (ref 22–29)
CORRECTED TEMPERATURE (PCO2): 94 MMHG (ref 19–50)
CORRECTED TEMPERATURE (PH): 7.36 (ref 7.35–7.45)
CORRECTED TEMPERATURE (PO2): 98 MMHG (ref 80–100)
CREAT SERPL-MCNC: 0.58 MG/DL (ref 0.73–1.18)
CREAT/UREA NIT SERPL: 22
EOSINOPHIL # BLD AUTO: 0.08 X10(3)/MCL (ref 0–0.9)
EOSINOPHIL NFR BLD AUTO: 0.9 %
ERYTHROCYTE [DISTWIDTH] IN BLOOD BY AUTOMATED COUNT: 13.2 % (ref 11.5–17)
GFR SERPLBLD CREATININE-BSD FMLA CKD-EPI: >60 MLS/MIN/1.73/M2
GLUCOSE SERPL-MCNC: 180 MG/DL (ref 74–100)
HCO3 UR-SCNC: 53.1 MMOL/L (ref 22–26)
HCT VFR BLD AUTO: 28.9 % (ref 42–52)
HGB BLD-MCNC: 8.5 G/DL (ref 12–16)
HGB BLD-MCNC: 8.8 GM/DL (ref 14–18)
IMM GRANULOCYTES # BLD AUTO: 0.04 X10(3)/MCL (ref 0–0.04)
IMM GRANULOCYTES NFR BLD AUTO: 0.5 %
LYMPHOCYTES # BLD AUTO: 1.03 X10(3)/MCL (ref 0.6–4.6)
LYMPHOCYTES NFR BLD AUTO: 12 %
MAYO GENERIC ORDERABLE RESULT: NORMAL
MCH RBC QN AUTO: 30.6 PG (ref 27–31)
MCHC RBC AUTO-ENTMCNC: 30.4 MG/DL (ref 33–36)
MCV RBC AUTO: 100.3 FL (ref 80–94)
MONOCYTES # BLD AUTO: 0.72 X10(3)/MCL (ref 0.1–1.3)
MONOCYTES NFR BLD AUTO: 8.4 %
NEUTROPHILS # BLD AUTO: 6.7 X10(3)/MCL (ref 2.1–9.2)
NEUTROPHILS NFR BLD AUTO: 78 %
NRBC BLD AUTO-RTO: 0 %
PCO2 BLDA: 94 MMHG (ref 19–50)
PH SMN: 7.36 [PH] (ref 7.35–7.45)
PLATELET # BLD AUTO: 159 X10(3)/MCL (ref 130–400)
PMV BLD AUTO: 9.7 FL (ref 7.4–10.4)
PO2 BLDA: 98 MMHG (ref 80–100)
POC BASE DEFICIT: 24.2 MMOL/L (ref -2–2)
POC COHB: 2.1 %
POC IONIZED CALCIUM: 1.1 MMOL/L (ref 1.12–1.23)
POC METHB: 1 % (ref 0.4–1.5)
POC O2HB: 95.7 % (ref 94–97)
POC SATURATED O2: 97.4 %
POC TEMPERATURE: 37 °C
POTASSIUM BLD-SCNC: 4 MMOL/L (ref 3.5–5)
POTASSIUM SERPL-SCNC: 3.8 MMOL/L (ref 3.5–5.1)
RBC # BLD AUTO: 2.88 X10(6)/MCL (ref 4.7–6.1)
SODIUM BLD-SCNC: 132 MMOL/L (ref 137–145)
SODIUM SERPL-SCNC: 136 MMOL/L (ref 136–145)
SPECIMEN SOURCE: ABNORMAL
WBC # SPEC AUTO: 8.6 X10(3)/MCL (ref 4.5–11.5)

## 2022-08-21 PROCEDURE — 25000003 PHARM REV CODE 250: Performed by: INTERNAL MEDICINE

## 2022-08-21 PROCEDURE — 21400001 HC TELEMETRY ROOM

## 2022-08-21 PROCEDURE — 99900035 HC TECH TIME PER 15 MIN (STAT)

## 2022-08-21 PROCEDURE — 63600175 PHARM REV CODE 636 W HCPCS: Performed by: INTERNAL MEDICINE

## 2022-08-21 PROCEDURE — 82803 BLOOD GASES ANY COMBINATION: CPT

## 2022-08-21 PROCEDURE — 71045 X-RAY EXAM CHEST 1 VIEW: CPT | Mod: TC

## 2022-08-21 PROCEDURE — 36415 COLL VENOUS BLD VENIPUNCTURE: CPT | Performed by: NURSE PRACTITIONER

## 2022-08-21 PROCEDURE — 93010 EKG 12-LEAD: ICD-10-PCS | Mod: ,,, | Performed by: INTERNAL MEDICINE

## 2022-08-21 PROCEDURE — 36600 WITHDRAWAL OF ARTERIAL BLOOD: CPT

## 2022-08-21 PROCEDURE — 93010 ELECTROCARDIOGRAM REPORT: CPT | Mod: ,,, | Performed by: INTERNAL MEDICINE

## 2022-08-21 PROCEDURE — 85025 COMPLETE CBC W/AUTO DIFF WBC: CPT | Performed by: NURSE PRACTITIONER

## 2022-08-21 PROCEDURE — 80048 BASIC METABOLIC PNL TOTAL CA: CPT | Performed by: NURSE PRACTITIONER

## 2022-08-21 PROCEDURE — 25000003 PHARM REV CODE 250: Performed by: NURSE PRACTITIONER

## 2022-08-21 PROCEDURE — 93005 ELECTROCARDIOGRAM TRACING: CPT

## 2022-08-21 PROCEDURE — 27000221 HC OXYGEN, UP TO 24 HOURS

## 2022-08-21 RX ORDER — LORAZEPAM 1 MG/1
1 TABLET ORAL EVERY 4 HOURS PRN
Status: DISCONTINUED | OUTPATIENT
Start: 2022-08-21 | End: 2022-08-23

## 2022-08-21 RX ADMIN — Medication 400 MG: at 09:08

## 2022-08-21 RX ADMIN — FAMOTIDINE 20 MG: 20 TABLET, FILM COATED ORAL at 09:08

## 2022-08-21 RX ADMIN — ASPIRIN 81 MG CHEWABLE TABLET 81 MG: 81 TABLET CHEWABLE at 09:08

## 2022-08-21 RX ADMIN — TRAZODONE HYDROCHLORIDE 50 MG: 50 TABLET ORAL at 09:08

## 2022-08-21 RX ADMIN — FUROSEMIDE 20 MG: 10 INJECTION, SOLUTION INTRAMUSCULAR; INTRAVENOUS at 05:08

## 2022-08-21 RX ADMIN — ENOXAPARIN SODIUM 80 MG: 80 INJECTION SUBCUTANEOUS at 05:08

## 2022-08-21 RX ADMIN — ATORVASTATIN CALCIUM 40 MG: 40 TABLET, FILM COATED ORAL at 09:08

## 2022-08-21 RX ADMIN — METOLAZONE 5 MG: 5 TABLET ORAL at 09:08

## 2022-08-21 RX ADMIN — LORAZEPAM 1 MG: 1 TABLET ORAL at 11:08

## 2022-08-21 NOTE — PROGRESS NOTES
"[facility]  HOSPITAL MEDICINE PROGRESS NOTE      Patient Name: Harpreet Rodas  MRN: [id number]  Admission Date: 8/13/2022 12:32 PM  Status: IP- Inpatient   Length of Stay: 8 days  Face-to-Face encounter date: 08/21/2022       CHIEF COMPLAINT  Follow-up on respiratory failure and congestive heart failure      HOSPITAL COURSE  Patient is a 56-year-old white male with a past medical history of chronic obstructive pulmonary disease. The patient presented to [facility] on 8/13/2022 with a primary complaint of dyspnea on exertion, edema and chest pain.      Patient reports that for about 6 months he has had worsening dyspnea on exertion. Currently, just walking in his living room make him extremely short of breath. He started having swelling of his right arm and both legs 3 days ago. He started having diarrhea at the same time. He has lost weight. He says he gets hungry but feels full after a few bites and the food goes right through him. He came to the ER he began having left side chest pain, "a sticking sensation". He denies associated symptoms and it only lasts a short time. No radiation. No known history of CAD. Denies fever, chills or sweats. He quit smoking and drinking about 2 months ago after his wife had pneumonia and his breathing was worsening. He was drinking 4-5 40oz beers a week prior to quitting. The pt became very short of breath when trying to get into wheelchair for chest CT. However, he was able to tolerate CT and lay flat when transported by bed. He was given Lasix in the ED. He says he is feeling better. He is not on home oxygen. Only home med is an inhaler. Echocardiogram showed LVEF 15%, grade III left ventricular diastolic dysfunction, pulmonary hypertension, mild tricuspid regurgitation, moderate right atrial enlargement.    Interval history:  Patient was sitting up in bed watching television when I initially evaluated him this morning.  Nursing staff reported that he has " intermittent anxiety attacks attributed to dyspnea.  He was afebrile, hemodynamically stable and on supplemental oxygen at 2 liters/minute via nasal cannula.    At 2:15 p.m. I received notification that the patient was having respiratory distress with a heart rate of 120, respiratory rate of 31 breaths per minute, a blood pressure of 130/73 and an oxygen saturation of 100% on supplemental oxygen at 10 liters/minute via OxyMask.  I ordered stat arterial blood gases and a chest x-ray.  Chest x-ray was interpreted as similar to previous.  Arterial blood gases appear somewhat similar to those obtained on 08/16/2022 with the exception that pO2 has improved and pCO2 has slightly increased however it is compensated as pH is only 7.36.    OBJECTIVE    VITAL SIGNS: 24 HRS MIN & MAX LAST   Temp  Min: 97.3 °F (36.3 °C)  Max: 98.6 °F (37 °C) 97.6 °F (36.4 °C)   BP  Min: 100/67  Max: 120/72 100/67   Pulse  Min: 90  Max: 110  92   Resp  Min: 20  Max: 28 (!) 24   SpO2  Min: 90 %  Max: 97 % (!) 92 %       LABS/MICROBIOLOGY/MEDICATIONS/DIAGNOSTICS  I have reviewed all pertinent lab results within the past 24 hours.    Recent Labs   Lab 08/19/22  0647 08/20/22  0905 08/21/22  1000   WBC 9.1 8.9 8.6   RBC 3.92* 3.38* 2.88*   HGB 11.9* 10.4* 8.8*   HCT 39.8* 34.2* 28.9*   .5* 101.2* 100.3*   MCH 30.4 30.8 30.6   MCHC 29.9* 30.4* 30.4*   RDW 13.4 13.4 13.2    155 159   MPV 9.8 10.1 9.7       Recent Labs   Lab 08/15/22  0330 08/16/22  0353 08/16/22  0819 08/17/22  0537 08/17/22  1022 08/18/22  0419 08/19/22  0647 08/20/22  0905 08/21/22  1000 08/21/22  1505    140  --  139  --  138 139 140 136  --    K 3.5 3.2*  --  3.8  --  3.8 3.6 4.1 3.8  --    CO2 37* 41*  --  43*  --  39* 43* 44* 44*  --    BUN 38.1* 26.2*  --  19.0  --  16.0 14.2 11.7 12.8  --    CREATININE 0.86 0.70*  --  0.71*  --  0.69* 0.64* 0.61* 0.58*  --    CALCIUM 8.0* 7.8*  --  7.7*  --  8.0* 8.3* 8.3* 8.3*  --    PH  --   --  7.39  --   --   --   --    --   --  7.36   MG 1.50* 1.50*  --  1.90  --  2.00  --   --   --   --    ALBUMIN 2.6*  --   --   --  2.5*  --  2.8*  --   --   --    ALKPHOS 86  --   --   --   --   --  93  --   --   --    ALT 23  --   --   --   --   --  33  --   --   --    AST 25  --   --   --   --   --  45*  --   --   --    BILITOT 0.5  --   --   --   --   --  1.0  --   --   --        Recent Labs     08/20/22  0905 08/21/22  1000   WBC 8.9 8.6   RBC 3.38* 2.88*   HGB 10.4* 8.8*   HCT 34.2* 28.9*   .2* 100.3*   MCH 30.8 30.6   MCHC 30.4* 30.4*   RDW 13.4 13.2     Recent Labs     08/19/22  0647 08/20/22  0905 08/21/22  1000   CHLORIDE 89* 90* 87*   CO2 43* 44* 44*   BUN 14.2 11.7 12.8   CREATININE 0.64* 0.61* 0.58*   GLUCOSE 83 146* 180*   CALCIUM 8.3* 8.3* 8.3*   ALBUMIN 2.8*  --   --    ALKPHOS 93  --   --    ALT 33  --   --    AST 45*  --   --        Microbiology Results (last 7 days)     Procedure Component Value Units Date/Time    Body Fluid Culture [[id number]] Collected: 08/17/22 1052    Order Status: Completed Specimen: Body Fluid from Thoracentesis Fluid Updated: 08/21/22 0838     Body Fluid Culture No growth at 4 days    Blood Culture [[id number]]  (Normal) Collected: 08/13/22 1314    Order Status: Completed Specimen: Blood from Arm, Left Updated: 08/18/22 2102     CULTURE, BLOOD ([facility]) No Growth at 5 days    Blood Culture [[id number]]  (Normal) Collected: 08/13/22 1314    Order Status: Completed Specimen: Blood from Hand, Left Updated: 08/18/22 2102     CULTURE, BLOOD ([facility]) No Growth at 5 days    Gram Stain [[id number]] Collected: 08/17/22 1052    Order Status: Completed Specimen: Body Fluid from Pleural Fluid Updated: 08/18/22 0745     GRAM STAIN No WBCs, No bacteria seen     Gram Stain [[id number]] Collected: 08/17/22 1052    Order Status: Canceled Specimen: Thoracentesis Fluid Updated: 08/17/22 1242    Fungal Culture [[id number]] Collected: 08/17/22 1052    Order Status: Canceled Specimen: Thoracentesis Fluid Updated: 08/17/22 1135     Fungal Culture [[id number]] Collected: 08/17/22 1052    Order Status: Sent Specimen: Body Fluid from Pleural Fluid Updated: 08/17/22 1132    Mycobacteria and Nocardia Culture [[id number]] Collected: 08/17/22 1052    Order Status: Canceled Specimen: Body Fluid from Thoracentesis Fluid Updated: 08/17/22 1132    Mycobacteria and Nocardia Culture [[id number]] Collected: 08/17/22 1052    Order Status: Sent Specimen: Body Fluid from Thoracentesis Fluid Updated: 08/17/22 1131    Respiratory Culture [[id number]]     Order Status: Sent Specimen: Sputum, Expectorated     Urine culture [[id number]] Collected: 08/13/22 1254    Order Status: Completed Specimen: Urine, Clean Catch Updated: 08/15/22 0958     Urine Culture No Growth           X-Ray Chest 1 View  Narrative: EXAMINATION:  XR CHEST 1 VIEW    CLINICAL HISTORY:  sob;    TECHNIQUE:  One view    COMPARISON:  August 20, 2022.    FINDINGS:  Cardiopericardial silhouette is within normal limits.  Right hydropneumothorax is without significant interval change.  Left hydropneumothorax is again without significant interval change.  Bilateral lungs parenchymal coarsening reflect congestive infiltrative process show similar appearance.  Impression: Bilateral hydropneumothorax, unchanged.    Electronically signed by: Stephon Solis  Date:    08/21/2022  Time:    15:12        Scheduled Med:   aspirin  81 mg Oral Daily    atorvastatin  40 mg Oral QHS    enoxaparin  80 mg Subcutaneous Q12H    famotidine  20 mg Oral BID    furosemide (LASIX) injection  20 mg Intravenous Q12H    magnesium oxide  400 mg Oral TID        Continuous Infusions:  None.    PRN Meds:  acetaminophen, albuterol-ipratropium, LORazepam, melatonin, promethazine, sodium chloride 0.9%, sodium chloride 0.9%, trazodone       PHYSICAL EXAM  General:  Cachectic chronically ill-appearing white male who appears much older than his stated age, in no respiratory distress with generalized edema  HENT: normocephalic, atraumatic,  poor dentition  Eye: PERRL, EOMI, clear conjunctiva  Neck: full ROM, no thyromegaly  Respiratory: diminished breath sounds with bibasilar crackles  Cardiovascular: tachycardic with a regular rhythm, bilateral upper and lower extremity edema  Gastrointestinal: non-distended, positive bowel sounds, non-tender  Genitourinary: scrotal edema  Musculoskeletal: extensive generalized muscular atrophy  Integumentary: warm, dry, intact, no rashes  Neurological: cranial nerves grossly intact, no focal neurological deficit  Psychiatric: cooperative, anxious      ASSESSMENT  Acute hypoxic respiratory failure  Acute combined systolic and diastolic congestive heart failure left ventricular ejection fraction of 15% and grade III left ventricular diastolic dysfunction  Dilated cardiomyopathy  Left lower lobe acute pulmonary thromboembolism  Large right pleural effusion status post right thoracentesis 8/17/22  Persistent bilateral hydropneumothorax  Anasarca and ascites  Pulmonary hypertension  Probable hepatic cirrhosis with long history of alcohol abuse  COPD with emphysema with long history of tobacco abuse  Macrocytic anemia, worse  Type 2 NSTEMI  Cachexia  Compensated respiratory acidosis with severe contraction metabolic alkalosis      PLAN  Case discussed with [facility] nurse practitioner.  Chest x-ray appears worse despite aggressive diuresis.  Patient is simply not making any progress.  [facility] continuing diuresis with intravenous Lasix 20 mg every 12 hours   I will discontinue Zaroxolyn due to severe metabolic alkalosis  Continue supplemental oxygen to keep SaO2 greater than 90%  Blood cultures and urine culture are thus far negative  Continue aspirin and statin  [facility] to start GDMT when euvolemic which is unlikely to happen any time soon if ever  Will need Lifevest prior to discharge if he ever goes home  Will need ischemic work up inpatient when euvolemic which is unlikely to happen any time soon if ever  Continue therapeutic dose  Lovenox. Plan to switch to oral anticoagulation if he ever goes home  Bilateral upper and lower extremity Doppler ultrasounds were negative for deep or superficial vein thrombosis   HIV, Hepatitis panel, syphilis were all negative  Pulmonology ordered a CT of the abdomen and pelvis with contrast as part of a malignancy evaluation which was unrevealing  Patient has an extremely poor prognosis.  Requested an evaluation from palliative medicine.  Hopefully when they return tomorrow patient can be convinced to transition to inpatient hospice.    DVT Prophylaxis:  Full-dose Lovenox    Critical Care Diagnosis: Acute combined systolic congestive heart failure requiring IV diuretics; acute hypoxic respiratory failure requiring supplemental oxygen  Critical Care Interventions: Hands-on evaluation, review of labs, radiographs, medical records, and discussion with the patient and medical staff in order to assess and manage the high probability of imminent or life-threatening deterioration of cardio-respiratory status requiring vasopressor support and/or intubation and mechanical ventilation.  Critical Care Time Spent: 35 minutes      Patient condition:  Guarded with very poor long-term prognosis    Anticipated discharge and disposition:  Patient would best benefit from transfer to inpatient hospice as soon as possible  __________________________________________________________________________    All diagnosis and differential diagnosis have been reviewed; assessment and plan have been documented. I have personally reviewed the test results that are presently available; I have reviewed the patient's medication list. I have reviewed the consulting providers' recommendations. I have reviewed or attempted to review medical records based upon their availability.  All of the patient's questions have been addressed and answered. Patient is agreeable to the above stated plan. I will continue to monitor closely and make adjustment to  medical management as needed.    This document was created with the assistance of a voice recognition software. There may be transcription errors as a result of using this technology, however minimal. Effort has been made to ensure accuracy of transcription but any obvious errors or omissions should be clarified with the author of this document.        Jose Manuel Natarajan MD   [facility]  08/21/2022

## 2022-08-21 NOTE — PROGRESS NOTES
[facility] - 3rd Floor Medical Telemetry  Cardiology  Progress Note    Patient Name: Harpreet Rodas  MRN: [id number]  Admission Date: 8/13/2022  Hospital Length of Stay: 8 days  Code Status: Full Code   Attending Provider: Ana Rosa Fry MD   Consulting Provider: ART De Oliveira  Primary Care Physician: Primary Doctor No  Principal Problem:Anasarca    Patient information was obtained from patient and ER records.     Subjective:     Chief Complaint:       HPI:   Mr. Mayen is a 57y/o male, unknown to [facility], with PMHx significant for COPD and previous heavy smoker/drinker who presented to [facility] via EMS for c/o SOB, MON, orthopnea, peripheral edema, diarrhea, and chest pain described as sticking sensation. No radiation or associated symptoms.  RA sat upon EMS arrival-88% and he was placed on Bipap. In ED, workup significant for BUN/creatinine 45/1.36, K 6.1, D-dimer 3.05, BNP 3280, Troponin 0.134-0.120-0.105. CXR revealing bilateral pleural effusions. Right> left with possible RLL opacification. CTA chest obtained LLL PE, large right pleural effusion, and findings of anasarca. EF revealing EF 15%, Grade III LVDD and RA/RV enlargement. He was placed on diuretics with consult placed to [facility] for PVC and 2nd degree Type II AVB. Review of tele reveals SR with non-conducted PACs. No 2nd degree heart block noted.     Hospital Course:  8/16/22: Patient awake in bed. Good diuresis with Lasix. Still volume overloaded.   8/17/22: Patient awake in bed. Continues to require O2 support. S/p right sided thoracentesis with reported removal of 1030 L straw colored fluid. CO2 rising. Patient started on diamox. Renal indices stable. -5.5L UOP x 24hr  8/18/22: Patient sitting up in bedside chair. Reports SOB and peripheral edema improving.   8/19/22: Patient awake in bed. NAD. Diminished breath sounds.   8/20/22: Patient awake in bed. NAD. Still orthopneic. Diminished breath sounds to Right lung fields. Edema continues  to recede.   8/21/22: Patient awake in bed. +SOB/orthopneic. Good diuresis with lasix.     PMH: COPD  PSH: mandible fx surgery,  Family History: Mother- cancer; brother- cancer  Social History: previous heavy smoker (2ppd)/drinker- quit 6/22    Previous Cardiac Diagnostics:   Venous US BUE 08/14/22:  No evidence of deep or superficial vein thrombosis in bilateral upper extremities.     Venous US BLE 08/14/22:  Negative DVT to BLE    TTE 08/13/22:  Severely decreased Systolic function. Estimated EF 15%. Grade III LVDD. There is pulmonary HTN, Mild TR. Moderate RV enlargement. Moderate RA enlargement. Intermediate CVP 8 mmHg. Estimated PASP 50mmHg    Review of Systems   Constitutional: Positive for activity change and fatigue.   Respiratory: Positive for shortness of breath.    Cardiovascular: Positive for leg swelling. Negative for chest pain.   Gastrointestinal: Positive for abdominal distention.   Genitourinary: Negative for scrotal swelling.   Neurological: Negative.    Psychiatric/Behavioral: Negative.        Objective:     Vital Signs (Most Recent):  Temp: 98.6 °F (37 °C) (08/21/22 0746)  Pulse: 106 (08/21/22 0746)  Resp: (!) 24 (08/21/22 0300)  BP: 120/72 (08/21/22 0746)  SpO2: (!) 90 % (08/21/22 0746) Vital Signs (24h Range):  Temp:  [97.3 °F (36.3 °C)-98.6 °F (37 °C)] 98.6 °F (37 °C)  Pulse:  [] 106  Resp:  [20-28] 24  SpO2:  [90 %-97 %] 90 %  BP: (109-150)/(72-78) 120/72     Weight: 61.5 kg (135 lb 9.3 oz)  Body mass index is 18.91 kg/m².    SpO2: (!) 90 %  O2 Device (Oxygen Therapy): nasal cannula      Intake/Output Summary (Last 24 hours) at 8/21/2022 0941  Last data filed at 8/20/2022 2300  Gross per 24 hour   Intake 360 ml   Output 2000 ml   Net -1640 ml       Lines/Drains/Airways       Peripheral Intravenous Line  Duration                  Peripheral IV - Single Lumen 08/14/22 2000 Left;Posterior Forearm 6 days                    Significant Labs:  Recent Results (from the past 72 hour(s))    Comprehensive Metabolic Panel    Collection Time: 08/19/22  6:47 AM   Result Value Ref Range    Sodium Level 139 136 - 145 mmol/L    Potassium Level 3.6 3.5 - 5.1 mmol/L    Chloride 89 (L) 98 - 107 mmol/L    Carbon Dioxide 43 (HH) 22 - 29 mmol/L    Glucose Level 83 74 - 100 mg/dL    Blood Urea Nitrogen 14.2 8.4 - 25.7 mg/dL    Creatinine 0.64 (L) 0.73 - 1.18 mg/dL    Calcium Level Total 8.3 (L) 8.4 - 10.2 mg/dL    Protein Total 5.7 (L) 6.4 - 8.3 gm/dL    Albumin Level 2.8 (L) 3.5 - 5.0 gm/dL    Globulin 2.9 2.4 - 3.5 gm/dL    Albumin/Globulin Ratio 1.0 (L) 1.1 - 2.0 ratio    Bilirubin Total 1.0 <=1.5 mg/dL    Alkaline Phosphatase 93 40 - 150 unit/L    Alanine Aminotransferase 33 0 - 55 unit/L    Aspartate Aminotransferase 45 (H) 5 - 34 unit/L    eGFR >60 mls/min/1.73/m2   CBC with Differential    Collection Time: 08/19/22  6:47 AM   Result Value Ref Range    WBC 9.1 4.5 - 11.5 x10(3)/mcL    RBC 3.92 (L) 4.70 - 6.10 x10(6)/mcL    Hgb 11.9 (L) 14.0 - 18.0 gm/dL    Hct 39.8 (L) 42.0 - 52.0 %    .5 (H) 80.0 - 94.0 fL    MCH 30.4 27.0 - 31.0 pg    MCHC 29.9 (L) 33.0 - 36.0 mg/dL    RDW 13.4 11.5 - 17.0 %    Platelet 142 130 - 400 x10(3)/mcL    MPV 9.8 7.4 - 10.4 fL    Neut % 72.8 %    Lymph % 15.2 %    Mono % 10.2 %    Eos % 1.2 %    Basophil % 0.3 %    Lymph # 1.38 0.6 - 4.6 x10(3)/mcL    Neut # 6.6 2.1 - 9.2 x10(3)/mcL    Mono # 0.92 0.1 - 1.3 x10(3)/mcL    Eos # 0.11 0 - 0.9 x10(3)/mcL    Baso # 0.03 0 - 0.2 x10(3)/mcL    IG# 0.03 0 - 0.04 x10(3)/mcL    IG% 0.3 %    NRBC% 0.0 %   Basic Metabolic Panel    Collection Time: 08/20/22  9:05 AM   Result Value Ref Range    Sodium Level 140 136 - 145 mmol/L    Potassium Level 4.1 3.5 - 5.1 mmol/L    Chloride 90 (L) 98 - 107 mmol/L    Carbon Dioxide 44 (HH) 22 - 29 mmol/L    Glucose Level 146 (H) 74 - 100 mg/dL    Blood Urea Nitrogen 11.7 8.4 - 25.7 mg/dL    Creatinine 0.61 (L) 0.73 - 1.18 mg/dL    BUN/Creatinine Ratio 19     Calcium Level Total 8.3 (L) 8.4 - 10.2  mg/dL    Anion Gap 6.0 mEq/L    eGFR >60 mls/min/1.73/m2   CBC with Differential    Collection Time: 08/20/22  9:05 AM   Result Value Ref Range    WBC 8.9 4.5 - 11.5 x10(3)/mcL    RBC 3.38 (L) 4.70 - 6.10 x10(6)/mcL    Hgb 10.4 (L) 14.0 - 18.0 gm/dL    Hct 34.2 (L) 42.0 - 52.0 %    .2 (H) 80.0 - 94.0 fL    MCH 30.8 27.0 - 31.0 pg    MCHC 30.4 (L) 33.0 - 36.0 mg/dL    RDW 13.4 11.5 - 17.0 %    Platelet 155 130 - 400 x10(3)/mcL    MPV 10.1 7.4 - 10.4 fL    Neut % 76.4 %    Lymph % 13.9 %    Mono % 7.7 %    Eos % 1.1 %    Basophil % 0.2 %    Lymph # 1.23 0.6 - 4.6 x10(3)/mcL    Neut # 6.8 2.1 - 9.2 x10(3)/mcL    Mono # 0.68 0.1 - 1.3 x10(3)/mcL    Eos # 0.10 0 - 0.9 x10(3)/mcL    Baso # 0.02 0 - 0.2 x10(3)/mcL    IG# 0.06 (H) 0 - 0.04 x10(3)/mcL    IG% 0.7 %    NRBC% 0.0 %       Significant Imaging:  Imaging Results              CTA Chest Abdomen Non Coronary (Final result)  Result time 08/13/22 15:55:52   Procedure changed from CTA Chest Non-Coronary (PE Study)     Final result by Basilio De Oliveira MD (08/13/22 15:55:52)                   Impression:      Left lower lobe pulmonary thromboembolism is noted.    Large right pleural effusion is present.    Findings of anasarca.    The bladder wall is prominent.  Correlate with urinalysis.    There are cortical defects of the bilateral kidneys likely related to chronic renal disease and scarring, however infarcts are less likely.    The liver appears heterogeneous which may be related to phase of contrast however is cirrhosis is not excluded.  Cardiomegaly is present.  Further evaluation may be obtained with ECHO.    Findings reported to Dr. Little prior to interpretation.      Electronically signed by: Basilio De Oliveira  Date:    08/13/2022  Time:    15:55               Narrative:    EXAMINATION:  CTA CHEST ABDOMEN NON CORONARY (XPD)    CLINICAL HISTORY:  Pulmonary embolism (PE) suspected, positive D-dimer;    TECHNIQUE:  Axial CTA images of the chest, abdomen, and  pelvis were obtained With Contrast. Sagittal and coronal reconstructed images were available for review.    Automatic exposure control was utilized to reduce the patient's radiation dose.    DLP = 582    COMPARISON:  No prior images available for comparison.    FINDINGS:  PULMONARY ARTERY: Thrombus is identified in the left lower lobe pulmonary artery.    AORTA: The thoracoabdominal aorta is normal in course and caliber. Scattered atherosclerotic disease is noted.    HEART: The heart is enlarged.  No pericardial effusion.    THYROID GLAND: The thyroid is not enlarged. There are no nodules identified.    AIRWAYS: Trachea is midline and tracheobronchial tree is patent.    LUNGS: Large right effusion with subsegmental atelectatic changes at the right base.  Emphysematous changes throughout the lungs.    THROACIC LYMPH NODES: There is no significant mediastinal, axillary or hilar lymphadenopathy.    HEPATOBILIARY: Somewhat nodular contour of the superior aspect of the liver with some heterogeneity may be related to phase of contrast versus cirrhosis.  Correlate with patient's history.  The gallbladder is normal.    SPLEEN: Normal    PANCREAS: No focal masses or ductal dilatation.    ADRENALS: No adrenal nodules.    KIDNEYS: No evidence of hydronephrosis.  Bilateral renal cortical perfusional defects likely related to scarring in chronic kidney disease.  Correlate with patient's history.  Less likely infarcts.    ABDOMINAL LYMPHADENOPATHY/RETROPERITONEUM: There is no retroperitoneal lymphadenopathy.    BOWEL: No acute bowel related abnormalities.    PELVIC VISCERA: The bladder wall is somewhat prominent.  Correlate with urinalysis.    PELVIC LYMPH NODES: No lymphadenopathy.    PERITONEUM/ BODY WALL: Diffuse body wall edema with moderate ascites noted.    SKELETAL: No aggressive appearing lytic/blastic lesion. No acute fractures, subluxations or dislocations.                                       X-Ray Chest 1 View (Final  result)  Result time 08/13/22 12:44:56      Final result by Basilio De Oliveira MD (08/13/22 12:44:56)                   Impression:      Right greater than left pleural effusion with possible right lower lobe opacification.  Underlying infectious process is not excluded.  Recommend continued follow-up.      Electronically signed by: Basilio De Oliveira  Date:    08/13/2022  Time:    12:44               Narrative:    EXAMINATION:  XR CHEST 1 VIEW    CLINICAL HISTORY:  shortness of breath;    TECHNIQUE:  Single view of the chest    COMPARISON:  No prior imaging available for comparison.    FINDINGS:  Right greater than left pleural effusion with possible right lower lobe opacification.  Underlying infectious process is not excluded.  Recommend continued follow-up.                                    Telemetry:          Physical Exam  HENT:      Mouth/Throat:      Mouth: Mucous membranes are moist.      Comments: Poor dentition  Cardiovascular:      Rate and Rhythm: Normal rate and regular rhythm.      Heart sounds: Normal heart sounds.   Pulmonary:      Breath sounds: Rales present.      Comments: Diminished breath sounds to right lung fields  Abdominal:      Palpations: Abdomen is soft.   Musculoskeletal:      Right lower leg: Edema present.      Left lower leg: Edema present.   Skin:     General: Skin is warm.   Neurological:      General: No focal deficit present.      Mental Status: He is alert.   Psychiatric:         Mood and Affect: Mood normal.         Current Inpatient Medications:    Current Facility-Administered Medications:     acetaminophen tablet 650 mg, 650 mg, Oral, Q4H PRN, Reginald Barker MD, 650 mg at 08/20/22 1311    albuterol-ipratropium 2.5 mg-0.5 mg/3 mL nebulizer solution 3 mL, 3 mL, Nebulization, Q4H PRN, Reginald Barker MD    aspirin chewable tablet 81 mg, 81 mg, Oral, Daily, ART De Oliveira, 81 mg at 08/21/22 0908    atorvastatin tablet 40 mg, 40 mg, Oral, QHS, Kwasi Thao MD, 40 mg at  08/20/22 2121    enoxaparin injection 80 mg, 80 mg, Subcutaneous, Q12H, Kwasi Thao MD, 80 mg at 08/21/22 0534    famotidine tablet 20 mg, 20 mg, Oral, BID, Reginald Barker MD, 20 mg at 08/21/22 0908    furosemide injection 20 mg, 20 mg, Intravenous, Q12H, Kwasi Thao MD, 20 mg at 08/21/22 0534    magnesium oxide tablet 400 mg, 400 mg, Oral, TID, Kwasi Thao MD, 400 mg at 08/21/22 0908    melatonin tablet 6 mg, 6 mg, Oral, Nightly PRN, Reginald Barker MD    metOLazone tablet 5 mg, 5 mg, Oral, Daily, Jaylin Grissom, FNP, 5 mg at 08/21/22 0908    promethazine tablet 25 mg, 25 mg, Oral, Q6H PRN, Reginald Barker MD    sodium chloride 0.9% flush 10 mL, 10 mL, Intravenous, PRN, Yuni Little MD    sodium chloride 0.9% flush 10 mL, 10 mL, Intravenous, PRN, Reginald Barker MD    traZODone tablet 50 mg, 50 mg, Oral, Nightly PRN, Alan Daigle MD, 50 mg at 08/20/22 2121         VTE Risk Mitigation (From admission, onward)           Ordered     enoxaparin injection 80 mg  Every 12 hours (non-standard times)         08/15/22 1314     IP VTE HIGH RISK PATIENT  Once         08/13/22 1523     Place sequential compression device  Until discontinued         08/13/22 1523     Place GIACOMO hose  Until discontinued         08/13/22 1523                    Assessment:   CMO, unspecified  --EF 15%  Acute combined systolic and diastolic HF  --EF 15%; Grade III LVDD  --BNP 3280  --s/p right thoracentesis with 1030 fluid removal' f/u x-ray revealing small apical pneumothorax ? Element of trapped lung  NSTEMI, Type II s/t heart failure  --Troponin 0.134->0.120->0.105  Left BBB  LLL PE  ANNETTA  --improving with diuresis  COPD  Former smoker  No hx GIB    Plan:   Excellent UOP. Still overloaded. Xray has worsened. Continue  Lasix 20 mg IVP bid. Add metolazone 5 mg daily.  Start GDMT once closer to euvolemia  Ensure accurate I&O's/levar ly weights  Troponin elevation with flat trend likely s/t CMO. Will plan for coronary evaluation  once euvolemic pending stable renal function.   Continue aspirin, atorvastatin, and weight based lovenox bid.  Lifevest will be needed prior to DC- order placed to case management  No indication for mechanical thrombectomy as patient is hemodynamically stable. Continue weight based Lovenox with plans to transition to DOAC prior to DC.         ART De Oliveira  Cardiology  [facility] - 3rd Floor Medical Telemetry  08/21/2022

## 2022-08-21 NOTE — PROGRESS NOTES
[facility] - 3rd Floor Medical Telemetry  Pulmonary Critical Care Note    Patient Name: Harpreet Rodas  MRN: [id number]  Admission Date: 8/13/2022  Hospital Length of Stay: 8 days  Code Status: Full Code  Attending Provider: Ana Rosa Fry MD  Primary Care Provider: Primary Doctor No     Subjective:     HPI:   This is a 56-year-old male who was admitted to [facility] on 08/13/2022 for complaints of worsening shortness of breath which has been ongoing over the last several months left-sided chest pain, and significant swelling of his right arm and bilateral feet.  Patient has history of drinking and smoking quit approximately 2 months ago prior to that was drinking 4-5 40 oz beer a weekly.  Workup revealed significantly elevated BNP of 3691.5, troponin 0.134, and CT imaging of the chest with a left lower lobe pulmonary thrombus, large right pleural effusion, and emphysematous changes throughout the lungs.  Pulmonary is being consulted for consideration of thoracentesis.  Patient was initiated on Lovenox b.i.d. secondary to pulmonary emboli. TTE obtained.     Further conversation revealed patient had been experiencing difficulty breathing approximately 6 months ago after inhalation of a combination of (murratic acid, sulfuric acid, bleach) while at work as a . Has been utilizing albuterol inhaler x2-3/day. Tried Trilegy x14 days without much relief. Approximately 3 months ago he began experiencing swelling in his B/L lower extremities in addition to early satiety.  He denied any associated weight loss stating he has always been a very thin person or fever/night sweats.  Denies any prior blood clots in legs/arm/lungs or any known cancers.  Mother and brother both passed away from unspecified cancer.      Patient has been exposed to a variety of pulmonary irritants/carcinogenic agents as listed below;  - Tobacco Use (approximately x70 pack years; quit x3 months ago)  - Asbestos  (Approximately 10-15 years)  - Sandblasting  - Glenn dust  - Harsh chemicals (Murratic Acid, Sulfuric Acid, Bleach; without appropriate respirator use)    Hospital Course/Significant events:  - Thoracentesis performed (22); patient tolerated well              -- 1030 cc straw colored fluid removed              -- Pending pleural fluid studies for further diagnostics              -- Post-procedural CXR (x2) w/ right-sided trapped lung  - Continue FD Lovenox in setting of left pulmonary embolism  - HIV/Hepatitis Panel/Syphillis (all resulted negative)  - B/L LE & RUE U/S NIVA (negative)  - TTE w/ EF of 15% w/ diastolic dysfunction    24 Hour Interval History:  Reports he is feeling much better. Nasal cannula was drying out his nose so he is on oxymask now. Awaiting pleural fluid pathology.     Past Medical History:   Diagnosis Date    COPD (chronic obstructive pulmonary disease)        Past Surgical History:   Procedure Laterality Date    MANDIBLE FRACTURE SURGERY         Social History     Socioeconomic History    Marital status:    Tobacco Use    Smoking status: Former Smoker     Packs/day: 2.00     Quit date: 2022     Years since quittin.1    Smokeless tobacco: Never Used   Substance and Sexual Activity    Alcohol use: Not Currently     Comment: quit 2 months ago    Drug use: Never       Current Outpatient Medications   Medication Instructions    albuterol sulfate (INV ALBUTEROL) 90 mcg inhalation Inhalation, As needed (PRN), Take one puff by mouth as directed by Physician.       Current Inpatient Medications   aspirin  81 mg Oral Daily    atorvastatin  40 mg Oral QHS    enoxaparin  80 mg Subcutaneous Q12H    famotidine  20 mg Oral BID    furosemide (LASIX) injection  20 mg Intravenous Q12H    magnesium oxide  400 mg Oral TID    metOLazone  5 mg Oral Daily       Review of Systems   All other systems reviewed and are negative.        Objective:       Intake/Output Summary (Last  24 hours) at 8/21/2022 1122  Last data filed at 8/20/2022 2300  Gross per 24 hour   Intake 360 ml   Output 2000 ml   Net -1640 ml         Vital Signs (Most Recent):  Temp: 97.6 °F (36.4 °C) (08/21/22 1111)  Pulse: 92 (08/21/22 1111)  Resp: (!) 24 (08/21/22 0300)  BP: 100/67 (08/21/22 1111)  SpO2: (!) 92 % (08/21/22 1111)  Body mass index is 18.91 kg/m².  Weight: 61.5 kg (135 lb 9.3 oz) Vital Signs (24h Range):  Temp:  [97.3 °F (36.3 °C)-98.6 °F (37 °C)] 97.6 °F (36.4 °C)  Pulse:  [] 92  Resp:  [20-28] 24  SpO2:  [90 %-97 %] 92 %  BP: (100-150)/(67-78) 100/67     Physical Exam  Constitutional:       General: He is not in acute distress.     Appearance: He is ill-appearing. He is not toxic-appearing.      Comments: Pleasant, cachetic gentleman   HENT:      Head: Normocephalic and atraumatic.      Mouth/Throat:      Mouth: Mucous membranes are moist.      Pharynx: Oropharynx is clear. No oropharyngeal exudate or posterior oropharyngeal erythema.   Eyes:      General: No scleral icterus.  Cardiovascular:      Rate and Rhythm: Normal rate and regular rhythm.      Heart sounds: Normal heart sounds.   Pulmonary:      Effort: Pulmonary effort is normal.      Breath sounds: Decreased breath sounds present.   Musculoskeletal:      Right lower leg: Edema present.      Left lower leg: Edema present.      Comments: 2+ edema to BLE; Right arm with significant edema-  improving.   Neurological:      General: No focal deficit present.      Mental Status: He is alert and oriented to person, place, and time.   Psychiatric:         Mood and Affect: Mood normal.         Behavior: Behavior normal.       Lines/Drains/Airways     Peripheral Intravenous Line  Duration                Peripheral IV - Single Lumen 08/14/22 2000 Left;Posterior Forearm 6 days                Significant Labs:    Lab Results   Component Value Date    WBC 8.6 08/21/2022    HGB 8.8 (L) 08/21/2022    HCT 28.9 (L) 08/21/2022    .3 (H) 08/21/2022    PLT  159 08/21/2022   BNP of 3691.5, troponin 0.134      BMP  Lab Results   Component Value Date     08/21/2022    K 3.8 08/21/2022    CO2 44 (HH) 08/21/2022    BUN 12.8 08/21/2022    CREATININE 0.58 (L) 08/21/2022    CALCIUM 8.3 (L) 08/21/2022   Magnesium 1.5, phosphorus 5.7    ABG  Recent Labs   Lab 08/16/22  0819   PH 7.39   PO2 64*   PCO2 90*   HCO3 54.5       Significant Imaging:  X-Ray Chest 1 View  Narrative: EXAMINATION:  XR CHEST 1 VIEW    CLINICAL HISTORY:  pneumothorax monitoring;    TECHNIQUE:  Single frontal view of the chest was performed.    COMPARISON:  08/17/2022    FINDINGS:  LINES AND TUBES: EKG/telemetry leads overlie the chest.    MEDIASTINUM AND REGINA: Cardiac silhouette is enlarged. EKG/telemetry leads overlie the chest.    LUNGS: Mild vascular congestion.  Basilar opacities.    PLEURA:There is a right hydropneumothorax.  Overall the combined volume of the pleural collection is similar to prior exam with increased fluid component and decreased pneumothorax component compared to prior exam.  Trace left pleural fluid.  Left pleural calcifications are chronic.    BONES: No acute osseous abnormality.  Impression: 1. Right hydropneumothorax.  Total volume of the collection is similar to prior exam.  Components however have shifted with apparent increased fluid component and decreased gas component compared to prior.  2. Enlarged cardiac silhouette with vascular congestion    Electronically signed by: Cielo Rust  Date:    08/20/2022  Time:    11:03  CT Abdomen Pelvis W Wo Contrast  Narrative: EXAMINATION:  CT ABDOMEN PELVIS W WO CONTRAST    CLINICAL HISTORY:  Intra-Abdominal Evaluation in setting of weight loss/early satiety & PMH;    TECHNIQUE:  Helically acquired images with axial, sagittal and coronal reformations were obtained from the lung bases to the pubic symphysis prior to and after the IV administration of contrast.    Automated tube current modulation, weight-based exposure dosing,  and/or iterative reconstruction technique utilized to reach lowest reasonably achievable exposure rate.    DLP: 451 mGy*cm    COMPARISON:  CT chest and abdomen 08/13/2022, chest radiograph 08/17/2022    FINDINGS:  HEART: Cardiomegaly.    LUNG BASES: Known left lower lobe pulmonary embolism.  Bilateral layering pleural effusions, moderate on the right, trace on the left.  Left pleural calcifications.  Small right pneumothorax.  Pulmonary emphysema basilar atelectasis.    LIVER: No appreciable focal hepatic lesion taking into account early timing of the contrast bolus.    BILIARY: No calcified gallstones.    PANCREAS: No inflammatory change.    SPLEEN: Normal in size    ADRENALS: No mass.    KIDNEYS/URETERS: Bilateral renal cortical scarring.  No renal calculus.  No hydronephrosis.    GI TRACT/MESENTERY:  No evidence of bowel obstruction or inflammation. The appendix is normal.    PERITONEUM: Small volume free intraperitoneal fluid.No free air.    LYMPH NODES: No enlarged lymph nodes by size criteria.    VASCULATURE: Aortic atherosclerosis.  Mesenteric arteries are patent.    BLADDER: Normal appearance given degree of distention.    REPRODUCTIVE ORGANS: Normal as visualized.    ABDOMINAL WALL: Body wall edema.  Foci of air at the anterior abdominal wall most compatible with medicinal injection.  Gas at the right chest wall.    BONES: No acute osseous abnormality.  Impression: 1. Anasarca.  There are pleural effusions, free intraperitoneal fluid and body wall edema  2. Right hydropneumothorax likely similar to prior chest radiograph taking into account differences in imaging modalities  3. Known left lower lobe pulmonary embolus  4. Left pleural calcifications  5. Cardiomegaly    Electronically signed by: Cielo Rust  Date:    08/20/2022  Time:    09:48      Assessment/Plan:     Assessment  1. Left Lower Lobe Pulmonary Embolism  2. Large Right-sided pleural effusion w/ Atelectasis - improved s/p thoracentesis  (8/17/22)  3. HFrEF (EF of 15%, 8/2022) w/ elevated BNP  4. Compensated Respiratory Acidosis w/ contraction alkalosis  5. RUE Edema  6. Hx of COPD (unquantified)  7. History of tobacco and alcohol abuse  8. Hx of Multiple Pulmonary Irritants as listed in above HPI  9. Cachectic w/ x3 months early satiety    Plan  - Following pleural fluid stuides  - Continue FD Lovenox in setting of left pulmonary embolism  - Continue diuresis as tolerated; currently Lasix 20mg Q12h; recommendations per cardiology.    ART Frost   Pulmonary Critical Care Medicine

## 2022-08-22 LAB
ALBUMIN FLD-MCNC: NORMAL G/DL
AMYLASE FLD-CCNC: NORMAL U/L
ANION GAP SERPL CALC-SCNC: 6 MEQ/L
BACTERIA FLD CULT: NORMAL
BASOPHILS # BLD AUTO: 0.03 X10(3)/MCL (ref 0–0.2)
BASOPHILS NFR BLD AUTO: 0.3 %
BODY FLD TYPE: NORMAL
BUN SERPL-MCNC: 15.6 MG/DL (ref 8.4–25.7)
CALCIUM SERPL-MCNC: 9 MG/DL (ref 8.4–10.2)
CHLORIDE SERPL-SCNC: 85 MMOL/L (ref 98–107)
CO2 SERPL-SCNC: 46 MMOL/L (ref 22–29)
CREAT SERPL-MCNC: 0.57 MG/DL (ref 0.73–1.18)
CREAT/UREA NIT SERPL: 27
EOSINOPHIL # BLD AUTO: 0.07 X10(3)/MCL (ref 0–0.9)
EOSINOPHIL NFR BLD AUTO: 0.7 %
ERYTHROCYTE [DISTWIDTH] IN BLOOD BY AUTOMATED COUNT: 13.2 % (ref 11.5–17)
GFR SERPLBLD CREATININE-BSD FMLA CKD-EPI: >60 MLS/MIN/1.73/M2
GLUCOSE SERPL-MCNC: 97 MG/DL (ref 74–100)
HCT VFR BLD AUTO: 28.5 % (ref 42–52)
HGB BLD-MCNC: 8.8 GM/DL (ref 14–18)
IMM GRANULOCYTES # BLD AUTO: 0.06 X10(3)/MCL (ref 0–0.04)
IMM GRANULOCYTES NFR BLD AUTO: 0.6 %
LDH FLD L TO P-CCNC: NORMAL U/L
LYMPHOCYTES # BLD AUTO: 1.46 X10(3)/MCL (ref 0.6–4.6)
LYMPHOCYTES NFR BLD AUTO: 13.8 %
MCH RBC QN AUTO: 30.2 PG (ref 27–31)
MCHC RBC AUTO-ENTMCNC: 30.9 MG/DL (ref 33–36)
MCV RBC AUTO: 97.9 FL (ref 80–94)
MONOCYTES # BLD AUTO: 1.03 X10(3)/MCL (ref 0.1–1.3)
MONOCYTES NFR BLD AUTO: 9.8 %
NEUTROPHILS # BLD AUTO: 7.9 X10(3)/MCL (ref 2.1–9.2)
NEUTROPHILS NFR BLD AUTO: 74.8 %
NRBC BLD AUTO-RTO: 0 %
PLATELET # BLD AUTO: 142 X10(3)/MCL (ref 130–400)
PMV BLD AUTO: 10.8 FL (ref 7.4–10.4)
POTASSIUM SERPL-SCNC: 4.2 MMOL/L (ref 3.5–5.1)
PROT FLD-MCNC: NORMAL G/DL
RBC # BLD AUTO: 2.91 X10(6)/MCL (ref 4.7–6.1)
SODIUM SERPL-SCNC: 137 MMOL/L (ref 136–145)
WBC # SPEC AUTO: 10.6 X10(3)/MCL (ref 4.5–11.5)

## 2022-08-22 PROCEDURE — 25000003 PHARM REV CODE 250: Performed by: INTERNAL MEDICINE

## 2022-08-22 PROCEDURE — 80048 BASIC METABOLIC PNL TOTAL CA: CPT | Performed by: INTERNAL MEDICINE

## 2022-08-22 PROCEDURE — 21400001 HC TELEMETRY ROOM

## 2022-08-22 PROCEDURE — 25000003 PHARM REV CODE 250: Performed by: NURSE PRACTITIONER

## 2022-08-22 PROCEDURE — 27000221 HC OXYGEN, UP TO 24 HOURS

## 2022-08-22 PROCEDURE — 85025 COMPLETE CBC W/AUTO DIFF WBC: CPT | Performed by: INTERNAL MEDICINE

## 2022-08-22 PROCEDURE — 63600175 PHARM REV CODE 636 W HCPCS: Performed by: INTERNAL MEDICINE

## 2022-08-22 PROCEDURE — 36415 COLL VENOUS BLD VENIPUNCTURE: CPT | Performed by: INTERNAL MEDICINE

## 2022-08-22 PROCEDURE — 87070 CULTURE OTHR SPECIMN AEROBIC: CPT | Performed by: STUDENT IN AN ORGANIZED HEALTH CARE EDUCATION/TRAINING PROGRAM

## 2022-08-22 RX ADMIN — FAMOTIDINE 20 MG: 20 TABLET, FILM COATED ORAL at 08:08

## 2022-08-22 RX ADMIN — ATORVASTATIN CALCIUM 40 MG: 40 TABLET, FILM COATED ORAL at 08:08

## 2022-08-22 RX ADMIN — FUROSEMIDE 20 MG: 10 INJECTION, SOLUTION INTRAMUSCULAR; INTRAVENOUS at 04:08

## 2022-08-22 RX ADMIN — Medication 400 MG: at 08:08

## 2022-08-22 RX ADMIN — Medication 400 MG: at 02:08

## 2022-08-22 RX ADMIN — ENOXAPARIN SODIUM 80 MG: 80 INJECTION SUBCUTANEOUS at 04:08

## 2022-08-22 RX ADMIN — ASPIRIN 81 MG CHEWABLE TABLET 81 MG: 81 TABLET CHEWABLE at 08:08

## 2022-08-22 NOTE — PT/OT/SLP PROGRESS
Occupational Therapy      Patient Name:  Harpreet Rodas   MRN:  [id number]    Patient not seen today secondary to Patient unwilling to participate, pt. Having trouble breathing however oxygen 96% on 3L and very tired requesting to rest today, RN notified. Will follow-up as schedule permits.    8/22/2022

## 2022-08-22 NOTE — PROGRESS NOTES
"[facility]  HOSPITAL MEDICINE PROGRESS NOTE      Patient Name: Harpreet Rodas  MRN: [id number]  Admission Date: 8/13/2022 12:32 PM  Status: IP- Inpatient   Length of Stay: 9 days  Face-to-Face encounter date: 08/22/2022       CHIEF COMPLAINT  Follow-up on respiratory failure and congestive heart failure      HOSPITAL COURSE  Patient is a 56-year-old white male with a past medical history of chronic obstructive pulmonary disease. The patient presented to [facility] on 8/13/2022 with a primary complaint of dyspnea on exertion, edema and chest pain.      Patient reports that for about 6 months he has had worsening dyspnea on exertion. Currently, just walking in his living room make him extremely short of breath. He started having swelling of his right arm and both legs 3 days ago. He started having diarrhea at the same time. He has lost weight. He says he gets hungry but feels full after a few bites and the food goes right through him. He came to the ER he began having left side chest pain, "a sticking sensation". He denies associated symptoms and it only lasts a short time. No radiation. No known history of CAD. Denies fever, chills or sweats. He quit smoking and drinking about 2 months ago after his wife had pneumonia and his breathing was worsening. He was drinking 4-5 40oz beers a week prior to quitting. The pt became very short of breath when trying to get into wheelchair for chest CT. However, he was able to tolerate CT and lay flat when transported by bed. He was given Lasix in the ED. He says he is feeling better. He is not on home oxygen. Only home med is an inhaler. Echocardiogram showed LVEF 15%, grade III left ventricular diastolic dysfunction, pulmonary hypertension, mild tricuspid regurgitation, moderate right atrial enlargement.    Patient was sitting up in bed watching television when I initially evaluated him this morning.  Nursing staff reported that he has intermittent anxiety " attacks attributed to dyspnea.  He was afebrile, hemodynamically stable and on supplemental oxygen at 2 liters/minute via nasal cannula.    At 2:15 p.m. on 08/21/2022, I received notification that the patient was having respiratory distress with a heart rate of 120, respiratory rate of 31 breaths per minute, a blood pressure of 130/73 and an oxygen saturation of 100% on supplemental oxygen at 10 liters/minute via OxyMask.  I ordered stat arterial blood gases and a chest x-ray.  Chest x-ray was interpreted as similar to previous.  Arterial blood gases appear somewhat similar to those obtained on 08/16/2022 with the exception that pO2 has improved and pCO2 has slightly increased however it is compensated as pH is only 7.36.    Interval history:  Patient sitting up in bed watching television.  He developed some hemoptysis otherwise no major changes.  He refused physical therapy and occupational therapy.  He absolutely refuses to discuss code status with me.  He remains dyspneic and orthopneic and is requiring supplemental oxygen at 3 liters/minute via nasal cannula.  He is hemodynamically stable.    OBJECTIVE    VITAL SIGNS: 24 HRS MIN & MAX LAST   Temp  Min: 96.9 °F (36.1 °C)  Max: 98.3 °F (36.8 °C) 96.9 °F (36.1 °C)   BP  Min: 109/72  Max: 128/79 124/66   Pulse  Min: 94  Max: 118  107   Resp  Min: 20  Max: 22 20   SpO2  Min: 85 %  Max: 99 % 99 %       LABS/MICROBIOLOGY/MEDICATIONS/DIAGNOSTICS  I have reviewed all pertinent lab results within the past 24 hours.    Recent Labs   Lab 08/20/22  0905 08/21/22  1000 08/22/22  0644   WBC 8.9 8.6 10.6   RBC 3.38* 2.88* 2.91*   HGB 10.4* 8.8* 8.8*   HCT 34.2* 28.9* 28.5*   .2* 100.3* 97.9*   MCH 30.8 30.6 30.2   MCHC 30.4* 30.4* 30.9*   RDW 13.4 13.2 13.2    159 142   MPV 10.1 9.7 10.8*       Recent Labs   Lab 08/16/22  0353 08/16/22  0819 08/17/22  0537 08/17/22  1022 08/18/22  0419 08/19/22  0647 08/20/22  0905 08/21/22  1000 08/21/22  1505 08/22/22  0644   NA  140  --  139  --  138 139 140 136  --  137   K 3.2*  --  3.8  --  3.8 3.6 4.1 3.8  --  4.2   CO2 41*  --  43*  --  39* 43* 44* 44*  --  46*   BUN 26.2*  --  19.0  --  16.0 14.2 11.7 12.8  --  15.6   CREATININE 0.70*  --  0.71*  --  0.69* 0.64* 0.61* 0.58*  --  0.57*   CALCIUM 7.8*  --  7.7*  --  8.0* 8.3* 8.3* 8.3*  --  9.0   PH  --  7.39  --   --   --   --   --   --  7.36  --    MG 1.50*  --  1.90  --  2.00  --   --   --   --   --    ALBUMIN  --   --   --  2.5*  --  2.8*  --   --   --   --    ALKPHOS  --   --   --   --   --  93  --   --   --   --    ALT  --   --   --   --   --  33  --   --   --   --    AST  --   --   --   --   --  45*  --   --   --   --    BILITOT  --   --   --   --   --  1.0  --   --   --   --        Recent Labs     08/21/22  1000 08/22/22  0644   WBC 8.6 10.6   RBC 2.88* 2.91*   HGB 8.8* 8.8*   HCT 28.9* 28.5*   .3* 97.9*   MCH 30.6 30.2   MCHC 30.4* 30.9*   RDW 13.2 13.2     Recent Labs     08/21/22  1000 08/22/22  0644   CHLORIDE 87* 85*   CO2 44* 46*   BUN 12.8 15.6   CREATININE 0.58* 0.57*   GLUCOSE 180* 97   CALCIUM 8.3* 9.0       Microbiology Results (last 7 days)     Procedure Component Value Units Date/Time    Respiratory Culture [[id number]]     Order Status: Sent Specimen: Sputum     Body Fluid Culture [[id number]] Collected: 08/17/22 1052    Order Status: Completed Specimen: Body Fluid from Thoracentesis Fluid Updated: 08/22/22 0901     Body Fluid Culture Final Report: At 5 days. No growth    Blood Culture [[id number]]  (Normal) Collected: 08/13/22 1314    Order Status: Completed Specimen: Blood from Arm, Left Updated: 08/18/22 2102     CULTURE, BLOOD ([facility]) No Growth at 5 days    Blood Culture [[id number]]  (Normal) Collected: 08/13/22 1314    Order Status: Completed Specimen: Blood from Hand, Left Updated: 08/18/22 2102     CULTURE, BLOOD ([facility]) No Growth at 5 days    Gram Stain [[id number]] Collected: 08/17/22 1052    Order Status: Completed Specimen: Body Fluid from Pleural Fluid  Updated: 08/18/22 0745     GRAM STAIN No WBCs, No bacteria seen     Gram Stain [[id number]] Collected: 08/17/22 1052    Order Status: Canceled Specimen: Thoracentesis Fluid Updated: 08/17/22 1242    Fungal Culture [[id number]] Collected: 08/17/22 1052    Order Status: Canceled Specimen: Thoracentesis Fluid Updated: 08/17/22 1135    Fungal Culture [[id number]] Collected: 08/17/22 1052    Order Status: Sent Specimen: Body Fluid from Pleural Fluid Updated: 08/17/22 1132    Mycobacteria and Nocardia Culture [[id number]] Collected: 08/17/22 1052    Order Status: Canceled Specimen: Body Fluid from Thoracentesis Fluid Updated: 08/17/22 1132    Mycobacteria and Nocardia Culture [[id number]] Collected: 08/17/22 1052    Order Status: Sent Specimen: Body Fluid from Thoracentesis Fluid Updated: 08/17/22 1131    Respiratory Culture [[id number]]     Order Status: Sent Specimen: Sputum, Expectorated            X-Ray Chest 1 View  Narrative: EXAMINATION:  XR CHEST 1 VIEW    CLINICAL HISTORY:  sob;    TECHNIQUE:  One view    COMPARISON:  August 20, 2022.    FINDINGS:  Cardiopericardial silhouette is within normal limits.  Right hydropneumothorax is without significant interval change.  Left hydropneumothorax is again without significant interval change.  Bilateral lungs parenchymal coarsening reflect congestive infiltrative process show similar appearance.  Impression: Bilateral hydropneumothorax, unchanged.    Electronically signed by: Stephon Solis  Date:    08/21/2022  Time:    15:12        Scheduled Med:   aspirin  81 mg Oral Daily    atorvastatin  40 mg Oral QHS    enoxaparin  80 mg Subcutaneous Q12H    famotidine  20 mg Oral BID    furosemide (LASIX) injection  20 mg Intravenous Q12H    magnesium oxide  400 mg Oral TID        Continuous Infusions:  None.    PRN Meds:  acetaminophen, albuterol-ipratropium, LORazepam, melatonin, promethazine, sodium chloride 0.9%, sodium chloride 0.9%, trazodone       PHYSICAL EXAM  General:   Cachectic chronically ill-appearing white male who appears much older than his stated age, in no respiratory distress with generalized edema  HENT: normocephalic, atraumatic, poor dentition  Eye: PERRL, EOMI, clear conjunctiva  Neck: full ROM, no thyromegaly  Respiratory: diminished breath sounds with bibasilar crackles  Cardiovascular: tachycardic with a regular rhythm, bilateral upper and lower extremity edema  Gastrointestinal: non-distended, positive bowel sounds, non-tender  Genitourinary: scrotal edema  Musculoskeletal: extensive generalized muscular atrophy  Integumentary: warm, dry, intact, no rashes  Neurological: cranial nerves grossly intact, no focal neurological deficit  Psychiatric: cooperative, anxious      ASSESSMENT  Acute hypoxic respiratory failure  Acute combined systolic and diastolic congestive heart failure left ventricular ejection fraction of 15% and grade III left ventricular diastolic dysfunction  Dilated cardiomyopathy  Left lower lobe acute pulmonary thromboembolism  Large right pleural effusion status post right thoracentesis 8/17/22  Persistent bilateral hydropneumothorax  Anasarca and ascites  Pulmonary hypertension  Probable hepatic cirrhosis with long history of alcohol abuse  COPD with emphysema with long history of tobacco abuse  Macrocytic anemia  Type 2 NSTEMI  Cachexia  Compensated respiratory acidosis with severe contraction metabolic alkalosis  Hemoptysis      PLAN  CIS discontinued Lovenox due to hemoptysis  CIS continuing diuresis with intravenous Lasix 20 mg every 12 hours   I discontinuedd Zaroxolyn due to severe metabolic alkalosis  Continue supplemental oxygen to keep SaO2 greater than 90%  Blood cultures and urine culture are thus far negative  Continue aspirin and statin  CIS to start GDMT when euvolemic which is unlikely to happen any time soon if ever  Will need Lifevest prior to discharge if he ever goes home  Will need ischemic work up inpatient when euvolemic  which is unlikely to happen any time soon if ever  Continue therapeutic dose Lovenox. Plan to switch to oral anticoagulation if he ever goes home  Bilateral upper and lower extremity Doppler ultrasounds were negative for deep or superficial vein thrombosis   HIV, Hepatitis panel, syphilis were all negative  Pulmonology ordered a CT of the abdomen and pelvis with contrast as part of a malignancy evaluation which was unrevealing  Patient has an extremely poor prognosis.  Requested an evaluation from palliative medicine.  Hopefully when they return today patient can be convinced to transition to inpatient hospice.    DVT Prophylaxis:  Full-dose Lovenox has been discontinued due to hemoptysis    Critical Care Diagnosis: Acute combined systolic congestive heart failure requiring IV diuretics; acute hypoxic respiratory failure requiring supplemental oxygen  Critical Care Interventions: Hands-on evaluation, review of labs, radiographs, medical records, and discussion with the patient and medical staff in order to assess and manage the high probability of imminent or life-threatening deterioration of cardio-respiratory status requiring vasopressor support and/or intubation and mechanical ventilation.  Critical Care Time Spent: 35 minutes      Patient condition:  Guarded with very poor long-term prognosis    Anticipated discharge and disposition:  Patient would best benefit from transfer to inpatient hospice as soon as possible  __________________________________________________________________________    All diagnosis and differential diagnosis have been reviewed; assessment and plan have been documented. I have personally reviewed the test results that are presently available; I have reviewed the patient's medication list. I have reviewed the consulting providers' recommendations. I have reviewed or attempted to review medical records based upon their availability.  All of the patient's questions have been addressed and  answered. Patient is agreeable to the above stated plan. I will continue to monitor closely and make adjustment to medical management as needed.    This document was created with the assistance of a voice recognition software. There may be transcription errors as a result of using this technology, however minimal. Effort has been made to ensure accuracy of transcription but any obvious errors or omissions should be clarified with the author of this document.        Jose Manuel Natarajan MD   [facility] Medicine  08/22/2022

## 2022-08-22 NOTE — PROGRESS NOTES
[facility] - 3rd Floor Medical Telemetry  Pulmonary Critical Care Note       In error

## 2022-08-22 NOTE — PLAN OF CARE
08/22/22 1236   Discharge Assessment   Assessment Type Discharge Planning Assessment   Confirmed/corrected address, phone number and insurance Yes   Confirmed Demographics Correct on Facesheet   Source of Information patient   Communicated ANIL with patient/caregiver Date not available/Unable to determine   Reason For Admission SOB   Lives With spouse   Do you expect to return to your current living situation? Yes   Do you have help at home or someone to help you manage your care at home? Yes   Who are your caregiver(s) and their phone number(s)? wife Lauren Rodas [phone]   Prior to hospitilization cognitive status: Unable to Assess   Current cognitive status: Alert/Oriented   Walking or Climbing Stairs Difficulty none   Dressing/Bathing Difficulty none   Readmission within 30 days? No   Patient currently being followed by outpatient case management? No   Do you currently have service(s) that help you manage your care at home? No   Do you have any problems affording any of your prescribed medications? TBD   How do you get to doctors appointments? family or friend will provide   Are you on dialysis? No   Do you take coumadin? No   Discharge Plan A Home with family   Discharge Plan B Home with family   DME Needed Upon Discharge  other (see comments)  (TBD)   Discharge Plan discussed with: Patient   Discharge Barriers Identified Unisured;Underinsured   Relationship/Environment   Name(s) of Who Lives With Patient jarod Rodas [phone]   2nd visit with patient. Discussed need for insurance cards in order to process order for [employer]. On Friday 8/19/22 patient stated his family would bring card to the hospital. States he called them again this morning. Then, when I asked for his wife's contact he told me he has her phone and that he called his boss this morning to relay the message to her. Will follow up.

## 2022-08-22 NOTE — PLAN OF CARE
Follow up visit with patient regarding insurance cards. Patient states his family did not bring insurance cards to the hospital yet, I reiterated the need for his insurance cards, he states he called them again this morning and will call again at lunchtime.     Saritha with [employer] notified of status.

## 2022-08-22 NOTE — PROGRESS NOTES
[facility] - 3rd Floor Medical Telemetry  Pulmonary Critical Care Note    Patient Name: Harpreet Rodas  MRN: [id number]  Admission Date: 8/13/2022  Hospital Length of Stay: 9 days  Code Status: Full Code  Attending Provider: Ana Rosa Fry MD  Primary Care Provider: Primary Doctor No     Subjective:     HPI:   This is a 56-year-old male who was admitted to [facility] on 08/13/2022 for complaints of worsening shortness of breath which has been ongoing over the last several months left-sided chest pain, and significant swelling of his right arm and bilateral feet.  Patient has history of drinking and smoking quit approximately 2 months ago prior to that was drinking 4-5 40 oz beer a weekly.  Workup revealed significantly elevated BNP of 3691.5, troponin 0.134, and CT imaging of the chest with a left lower lobe pulmonary thrombus, large right pleural effusion, and emphysematous changes throughout the lungs.  Pulmonary is being consulted for consideration of thoracentesis.  Patient was initiated on Lovenox b.i.d. secondary to pulmonary emboli. TTE obtained. Further conversation revealed patient had been experiencing difficulty breathing approximately 6 months ago after inhalation of a combination of (murratic acid, sulfuric acid, bleach) while at work as a . Has been utilizing albuterol inhaler x2-3/day. Tried Trilegy x14 days without much relief. Approximately 3 months ago he began experiencing swelling in his B/L lower extremities in addition to early satiety.  He denied any associated weight loss stating he has always been a very thin person or fever/night sweats.  Denies any prior blood clots in legs/arm/lungs or any known cancers.  Mother and brother both passed away from unspecified cancer.      Patient has been exposed to a variety of pulmonary irritants/carcinogenic agents as listed below;  - Tobacco Use (approximately x70 pack years; quit x3 months ago)  - Asbestos (Approximately  10-15 years)  - Sandblasting  - Glenn dust  - Harsh chemicals (Murratic Acid, Sulfuric Acid, Bleach; without appropriate respirator use)    Hospital Course/Significant events:  - Thoracentesis performed (22); patient tolerated well              -- 1030 cc straw colored fluid removed              -- Pending pleural fluid studies for further diagnostics              -- Post-procedural CXR (x2) w/ right-sided trapped lung  - Continue FD Lovenox in setting of left pulmonary embolism  - HIV/Hepatitis Panel/Syphillis (all resulted negative)  - B/L LE & RUE U/S NIVA (negative)  - TTE w/ EF of 15% w/ diastolic dysfunction    24 Hour Interval History:  Patient had one small episode of hematemesis overnight. Sputum Culture is a pending. He continues to work with PT/OT. ABG yesterday shows severe hypercapnia, however pH is compensated. Awaiting pleural fluid pathology as this was a sent off to [facility]. Patient is net negative -1640cc over the last 24 hours.    Past Medical History:   Diagnosis Date    COPD (chronic obstructive pulmonary disease)        Past Surgical History:   Procedure Laterality Date    MANDIBLE FRACTURE SURGERY         Social History     Socioeconomic History    Marital status:    Tobacco Use    Smoking status: Former Smoker     Packs/day: 2.00     Quit date: 2022     Years since quittin.1    Smokeless tobacco: Never Used   Substance and Sexual Activity    Alcohol use: Not Currently     Comment: quit 2 months ago    Drug use: Never       Current Outpatient Medications   Medication Instructions    albuterol sulfate (INV ALBUTEROL) 90 mcg inhalation Inhalation, As needed (PRN), Take one puff by mouth as directed by Physician.       Current Inpatient Medications   aspirin  81 mg Oral Daily    atorvastatin  40 mg Oral QHS    enoxaparin  80 mg Subcutaneous Q12H    famotidine  20 mg Oral BID    furosemide (LASIX) injection  20 mg Intravenous Q12H    magnesium oxide  400 mg  Oral TID       Review of Systems   All other systems reviewed and are negative.        Objective:       Intake/Output Summary (Last 24 hours) at 8/22/2022 0755  Last data filed at 8/22/2022 0400  Gross per 24 hour   Intake 960 ml   Output 2600 ml   Net -1640 ml         Vital Signs (Most Recent):  Temp: 98.1 °F (36.7 °C) (08/22/22 0400)  Pulse: 98 (08/22/22 0400)  Resp: (!) 22 (08/22/22 0400)  BP: 121/78 (08/22/22 0400)  SpO2: 95 % (08/22/22 0400)  Body mass index is 18.66 kg/m².  Weight: 60.7 kg (133 lb 13.1 oz) Vital Signs (24h Range):  Temp:  [97.5 °F (36.4 °C)-98.3 °F (36.8 °C)] 98.1 °F (36.7 °C)  Pulse:  [] 98  Resp:  [20-22] 22  SpO2:  [85 %-98 %] 95 %  BP: (100-128)/(67-79) 121/78     Physical Exam  Constitutional:       General: He is not in acute distress.     Appearance: He is ill-appearing. He is not toxic-appearing.      Comments: Pleasant, cachetic gentleman   HENT:      Head: Normocephalic and atraumatic.      Mouth/Throat:      Mouth: Mucous membranes are moist.      Pharynx: Oropharynx is clear. No oropharyngeal exudate or posterior oropharyngeal erythema.   Eyes:      General: No scleral icterus.  Cardiovascular:      Rate and Rhythm: Normal rate and regular rhythm.      Heart sounds: Normal heart sounds.   Pulmonary:      Effort: Pulmonary effort is normal.      Breath sounds: Decreased breath sounds present.   Musculoskeletal:      Right lower leg: Edema present.      Left lower leg: Edema present.      Comments: 2+ edema to BLE; Right arm with significant edema-  improving.   Neurological:      General: No focal deficit present.      Mental Status: He is alert and oriented to person, place, and time.   Psychiatric:         Mood and Affect: Mood normal.         Behavior: Behavior normal.       Lines/Drains/Airways     Peripheral Intravenous Line  Duration                Peripheral IV - Single Lumen 08/14/22 2000 Left;Posterior Forearm 7 days                Significant Labs:    Lab Results    Component Value Date    WBC 10.6 08/22/2022    HGB 8.8 (L) 08/22/2022    HCT 28.5 (L) 08/22/2022    MCV 97.9 (H) 08/22/2022     08/22/2022   BNP of 3691.5, troponin 0.134      BMP  Lab Results   Component Value Date     08/22/2022    K 4.2 08/22/2022    CO2 46 (HH) 08/22/2022    BUN 15.6 08/22/2022    CREATININE 0.57 (L) 08/22/2022    CALCIUM 9.0 08/22/2022   Magnesium 1.5, phosphorus 5.7    ABG  Recent Labs   Lab 08/21/22  1505   PH 7.36   PO2 98   PCO2 94*   HCO3 53.1*       Significant Imaging:  X-Ray Chest 1 View  Narrative: EXAMINATION:  XR CHEST 1 VIEW    CLINICAL HISTORY:  sob;    TECHNIQUE:  One view    COMPARISON:  August 20, 2022.    FINDINGS:  Cardiopericardial silhouette is within normal limits.  Right hydropneumothorax is without significant interval change.  Left hydropneumothorax is again without significant interval change.  Bilateral lungs parenchymal coarsening reflect congestive infiltrative process show similar appearance.  Impression: Bilateral hydropneumothorax, unchanged.    Electronically signed by: Stephon Solis  Date:    08/21/2022  Time:    15:12      Assessment/Plan:     Assessment  1. Left Lower Lobe Pulmonary Embolism  2. Large Right-sided pleural effusion w/ Atelectasis - improved s/p thoracentesis (8/17/22)  3. HFrEF (EF of 15%, 8/2022) w/ elevated BNP  4. Compensated Respiratory Acidosis w/ contraction alkalosis  5. RUE Edema  6. Hx of COPD (unquantified)  7. History of tobacco and alcohol abuse  8. Hx of Multiple Pulmonary Irritants as listed in above HPI  9. Cachectic w/ x3 months early satiety  10. Hypercapnia - Compensated     Plan  - Awating pleural fluid study results from thoracentesis on 8/17/22  - Continue FD Lovenox in setting of left pulmonary embolism, monitor for further episodes of hematemesis  - Ordered Sputum culture  - CT abd/pelvis negative for signs of malignancy    - Continue diuresis as tolerated; currently Lasix 20mg Q12h; recommendations per  cardiology  - Volume status is negative 1.4L last 24 hours and 14.5 liters total  -Recent ABG shows 7.36/94/98/53.1, but appears compensated     Harpreet Mancuso, DO   Pulmonary Critical Care Medicine

## 2022-08-22 NOTE — PROGRESS NOTES
[facility] - 3rd Floor Medical Telemetry  Cardiology  Progress Note    Patient Name: Harpreet Rodas  MRN: [id number]  Admission Date: 8/13/2022  Hospital Length of Stay: 9 days  Code Status: Full Code   Attending Provider: Ana Rosa Fry MD   Consulting Provider: ART De Oliveira  Primary Care Physician: Primary Doctor No  Principal Problem:Anasarca    Patient information was obtained from patient and ER records.     Subjective:     Chief Complaint:       HPI:   Mr. Mayen is a 57y/o male, unknown to [facility], with PMHx significant for COPD and previous heavy smoker/drinker who presented to [facility] via EMS for c/o SOB, MON, orthopnea, peripheral edema, diarrhea, and chest pain described as sticking sensation. No radiation or associated symptoms.  RA sat upon EMS arrival-88% and he was placed on Bipap. In ED, workup significant for BUN/creatinine 45/1.36, K 6.1, D-dimer 3.05, BNP 3280, Troponin 0.134-0.120-0.105. CXR revealing bilateral pleural effusions. Right> left with possible RLL opacification. CTA chest obtained LLL PE, large right pleural effusion, and findings of anasarca. EF revealing EF 15%, Grade III LVDD and RA/RV enlargement. He was placed on diuretics with consult placed to [facility] for PVC and 2nd degree Type II AVB. Review of tele reveals SR with non-conducted PACs. No 2nd degree heart block noted.     Hospital Course:  8/16/22: Patient awake in bed. Good diuresis with Lasix. Still volume overloaded.   8/17/22: Patient awake in bed. Continues to require O2 support. S/p right sided thoracentesis with reported removal of 1030 L straw colored fluid. CO2 rising. Patient started on diamox. Renal indices stable. -5.5L UOP x 24hr  8/18/22: Patient sitting up in bedside chair. Reports SOB and peripheral edema improving.   8/19/22: Patient awake in bed. NAD. Diminished breath sounds.   8/20/22: Patient awake in bed. NAD. Still orthopneic. Diminished breath sounds to Right lung fields. Edema continues  to recede.   8/21/22: Patient awake in bed. +SOB/orthopneic. Good diuresis with lasix.   8/22/22: Patient awake in bed. Continues to require O2 support and remains orthopneic. Breath sounds to right lung fields very diminished. Patient is coughing up large blood clots. Currently on weight based Lovenox BID.       PMH: COPD  PSH: mandible fx surgery,  Family History: Mother- cancer; brother- cancer  Social History: previous heavy smoker (2ppd)/drinker- quit 6/22    Previous Cardiac Diagnostics:   Venous US BUE 08/14/22:  No evidence of deep or superficial vein thrombosis in bilateral upper extremities.     Venous US BLE 08/14/22:  Negative DVT to BLE    TTE 08/13/22:  Severely decreased Systolic function. Estimated EF 15%. Grade III LVDD. There is pulmonary HTN, Mild TR. Moderate RV enlargement. Moderate RA enlargement. Intermediate CVP 8 mmHg. Estimated PASP 50mmHg    Review of Systems   Constitutional: Positive for activity change. Negative for fatigue.   Respiratory: Positive for cough and shortness of breath.         Coughing up large blood clots   Cardiovascular: Negative for chest pain and leg swelling.   Gastrointestinal: Negative for abdominal distention.   Genitourinary: Negative for scrotal swelling.   Neurological: Negative.    Psychiatric/Behavioral: Negative.        Objective:     Vital Signs (Most Recent):  Temp: 98.1 °F (36.7 °C) (08/22/22 0400)  Pulse: 98 (08/22/22 0400)  Resp: (!) 22 (08/22/22 0400)  BP: 121/78 (08/22/22 0400)  SpO2: 95 % (08/22/22 0400) Vital Signs (24h Range):  Temp:  [97.5 °F (36.4 °C)-98.6 °F (37 °C)] 98.1 °F (36.7 °C)  Pulse:  [] 98  Resp:  [20-22] 22  SpO2:  [85 %-98 %] 95 %  BP: (100-128)/(67-79) 121/78     Weight: 60.7 kg (133 lb 13.1 oz)  Body mass index is 18.66 kg/m².    SpO2: 95 %  O2 Device (Oxygen Therapy): nasal cannula      Intake/Output Summary (Last 24 hours) at 8/22/2022 0742  Last data filed at 8/22/2022 0400  Gross per 24 hour   Intake 960 ml   Output 2600  ml   Net -1640 ml       Lines/Drains/Airways     Peripheral Intravenous Line  Duration                Peripheral IV - Single Lumen 08/14/22 2000 Left;Posterior Forearm 7 days                Significant Labs:  Recent Results (from the past 72 hour(s))   Basic Metabolic Panel    Collection Time: 08/20/22  9:05 AM   Result Value Ref Range    Sodium Level 140 136 - 145 mmol/L    Potassium Level 4.1 3.5 - 5.1 mmol/L    Chloride 90 (L) 98 - 107 mmol/L    Carbon Dioxide 44 (HH) 22 - 29 mmol/L    Glucose Level 146 (H) 74 - 100 mg/dL    Blood Urea Nitrogen 11.7 8.4 - 25.7 mg/dL    Creatinine 0.61 (L) 0.73 - 1.18 mg/dL    BUN/Creatinine Ratio 19     Calcium Level Total 8.3 (L) 8.4 - 10.2 mg/dL    Anion Gap 6.0 mEq/L    eGFR >60 mls/min/1.73/m2   CBC with Differential    Collection Time: 08/20/22  9:05 AM   Result Value Ref Range    WBC 8.9 4.5 - 11.5 x10(3)/mcL    RBC 3.38 (L) 4.70 - 6.10 x10(6)/mcL    Hgb 10.4 (L) 14.0 - 18.0 gm/dL    Hct 34.2 (L) 42.0 - 52.0 %    .2 (H) 80.0 - 94.0 fL    MCH 30.8 27.0 - 31.0 pg    MCHC 30.4 (L) 33.0 - 36.0 mg/dL    RDW 13.4 11.5 - 17.0 %    Platelet 155 130 - 400 x10(3)/mcL    MPV 10.1 7.4 - 10.4 fL    Neut % 76.4 %    Lymph % 13.9 %    Mono % 7.7 %    Eos % 1.1 %    Basophil % 0.2 %    Lymph # 1.23 0.6 - 4.6 x10(3)/mcL    Neut # 6.8 2.1 - 9.2 x10(3)/mcL    Mono # 0.68 0.1 - 1.3 x10(3)/mcL    Eos # 0.10 0 - 0.9 x10(3)/mcL    Baso # 0.02 0 - 0.2 x10(3)/mcL    IG# 0.06 (H) 0 - 0.04 x10(3)/mcL    IG% 0.7 %    NRBC% 0.0 %   Basic Metabolic Panel    Collection Time: 08/21/22 10:00 AM   Result Value Ref Range    Sodium Level 136 136 - 145 mmol/L    Potassium Level 3.8 3.5 - 5.1 mmol/L    Chloride 87 (L) 98 - 107 mmol/L    Carbon Dioxide 44 (HH) 22 - 29 mmol/L    Glucose Level 180 (H) 74 - 100 mg/dL    Blood Urea Nitrogen 12.8 8.4 - 25.7 mg/dL    Creatinine 0.58 (L) 0.73 - 1.18 mg/dL    BUN/Creatinine Ratio 22     Calcium Level Total 8.3 (L) 8.4 - 10.2 mg/dL    Anion Gap 5.0 mEq/L    eGFR  >60 mls/min/1.73/m2   CBC with Differential    Collection Time: 08/21/22 10:00 AM   Result Value Ref Range    WBC 8.6 4.5 - 11.5 x10(3)/mcL    RBC 2.88 (L) 4.70 - 6.10 x10(6)/mcL    Hgb 8.8 (L) 14.0 - 18.0 gm/dL    Hct 28.9 (L) 42.0 - 52.0 %    .3 (H) 80.0 - 94.0 fL    MCH 30.6 27.0 - 31.0 pg    MCHC 30.4 (L) 33.0 - 36.0 mg/dL    RDW 13.2 11.5 - 17.0 %    Platelet 159 130 - 400 x10(3)/mcL    MPV 9.7 7.4 - 10.4 fL    Neut % 78.0 %    Lymph % 12.0 %    Mono % 8.4 %    Eos % 0.9 %    Basophil % 0.2 %    Lymph # 1.03 0.6 - 4.6 x10(3)/mcL    Neut # 6.7 2.1 - 9.2 x10(3)/mcL    Mono # 0.72 0.1 - 1.3 x10(3)/mcL    Eos # 0.08 0 - 0.9 x10(3)/mcL    Baso # 0.02 0 - 0.2 x10(3)/mcL    IG# 0.04 0 - 0.04 x10(3)/mcL    IG% 0.5 %    NRBC% 0.0 %   POCT ARTERIAL BLOOD GAS    Collection Time: 08/21/22  3:05 PM   Result Value Ref Range    POC PH 7.36 7.35 - 7.45    POC PCO2 94 (AA) 19 - 50 mmHg    POC PO2 98 80 - 100 mmHg    POC HEMOGLOBIN 8.5 (A) 12 - 16 g/dL    POC SATURATED O2 97.4 %    POC O2Hb 95.7 94.0 - 97.0 %    POC COHb 2.1 %    POC MetHb 1.0 0.40 - 1.5 %    POC Potassium 4.0 3.5 - 5.0 mmol/l    POC Sodium 132 (A) 137 - 145 mmol/l    POC Ionized Calcium 1.10 (A) 1.12 - 1.23 mmol/l    Correct Temperature (PH) 7.36 7.35 - 7.45    Corrected Temperature (pCO2) 94 (AA) 19 - 50 mmHg    Corrected Temperature (pO2) 98 80 - 100 mmHg    POC HCO3 53.1 (A) 22.0 - 26.0 mmol/l    Base Deficit 24.2 (A) -2.00 - 2.00 mmol/l    POC Temp 37.0 °C    Specimen source Arterial sample    Basic Metabolic Panel    Collection Time: 08/22/22  6:44 AM   Result Value Ref Range    Sodium Level 137 136 - 145 mmol/L    Potassium Level 4.2 3.5 - 5.1 mmol/L    Chloride 85 (L) 98 - 107 mmol/L    Carbon Dioxide 46 (HH) 22 - 29 mmol/L    Glucose Level 97 74 - 100 mg/dL    Blood Urea Nitrogen 15.6 8.4 - 25.7 mg/dL    Creatinine 0.57 (L) 0.73 - 1.18 mg/dL    BUN/Creatinine Ratio 27     Calcium Level Total 9.0 8.4 - 10.2 mg/dL    Anion Gap 6.0 mEq/L    eGFR >60  mls/min/1.73/m2   CBC with Differential    Collection Time: 08/22/22  6:44 AM   Result Value Ref Range    WBC 10.6 4.5 - 11.5 x10(3)/mcL    RBC 2.91 (L) 4.70 - 6.10 x10(6)/mcL    Hgb 8.8 (L) 14.0 - 18.0 gm/dL    Hct 28.5 (L) 42.0 - 52.0 %    MCV 97.9 (H) 80.0 - 94.0 fL    MCH 30.2 27.0 - 31.0 pg    MCHC 30.9 (L) 33.0 - 36.0 mg/dL    RDW 13.2 11.5 - 17.0 %    Platelet 142 130 - 400 x10(3)/mcL    MPV 10.8 (H) 7.4 - 10.4 fL    Neut % 74.8 %    Lymph % 13.8 %    Mono % 9.8 %    Eos % 0.7 %    Basophil % 0.3 %    Lymph # 1.46 0.6 - 4.6 x10(3)/mcL    Neut # 7.9 2.1 - 9.2 x10(3)/mcL    Mono # 1.03 0.1 - 1.3 x10(3)/mcL    Eos # 0.07 0 - 0.9 x10(3)/mcL    Baso # 0.03 0 - 0.2 x10(3)/mcL    IG# 0.06 (H) 0 - 0.04 x10(3)/mcL    IG% 0.6 %    NRBC% 0.0 %       Significant Imaging:  Imaging Results          CTA Chest Abdomen Non Coronary (Final result)  Result time 08/13/22 15:55:52   Procedure changed from CTA Chest Non-Coronary (PE Study)     Final result by Basilio De Oliveira MD (08/13/22 15:55:52)                 Impression:      Left lower lobe pulmonary thromboembolism is noted.    Large right pleural effusion is present.    Findings of anasarca.    The bladder wall is prominent.  Correlate with urinalysis.    There are cortical defects of the bilateral kidneys likely related to chronic renal disease and scarring, however infarcts are less likely.    The liver appears heterogeneous which may be related to phase of contrast however is cirrhosis is not excluded.  Cardiomegaly is present.  Further evaluation may be obtained with ECHO.    Findings reported to Dr. Collins prior to interpretation.      Electronically signed by: Basilio De Oliveira  Date:    08/13/2022  Time:    15:55             Narrative:    EXAMINATION:  CTA CHEST ABDOMEN NON CORONARY (XPD)    CLINICAL HISTORY:  Pulmonary embolism (PE) suspected, positive D-dimer;    TECHNIQUE:  Axial CTA images of the chest, abdomen, and pelvis were obtained With Contrast. Sagittal and  coronal reconstructed images were available for review.    Automatic exposure control was utilized to reduce the patient's radiation dose.    DLP = 582    COMPARISON:  No prior images available for comparison.    FINDINGS:  PULMONARY ARTERY: Thrombus is identified in the left lower lobe pulmonary artery.    AORTA: The thoracoabdominal aorta is normal in course and caliber. Scattered atherosclerotic disease is noted.    HEART: The heart is enlarged.  No pericardial effusion.    THYROID GLAND: The thyroid is not enlarged. There are no nodules identified.    AIRWAYS: Trachea is midline and tracheobronchial tree is patent.    LUNGS: Large right effusion with subsegmental atelectatic changes at the right base.  Emphysematous changes throughout the lungs.    THROACIC LYMPH NODES: There is no significant mediastinal, axillary or hilar lymphadenopathy.    HEPATOBILIARY: Somewhat nodular contour of the superior aspect of the liver with some heterogeneity may be related to phase of contrast versus cirrhosis.  Correlate with patient's history.  The gallbladder is normal.    SPLEEN: Normal    PANCREAS: No focal masses or ductal dilatation.    ADRENALS: No adrenal nodules.    KIDNEYS: No evidence of hydronephrosis.  Bilateral renal cortical perfusional defects likely related to scarring in chronic kidney disease.  Correlate with patient's history.  Less likely infarcts.    ABDOMINAL LYMPHADENOPATHY/RETROPERITONEUM: There is no retroperitoneal lymphadenopathy.    BOWEL: No acute bowel related abnormalities.    PELVIC VISCERA: The bladder wall is somewhat prominent.  Correlate with urinalysis.    PELVIC LYMPH NODES: No lymphadenopathy.    PERITONEUM/ BODY WALL: Diffuse body wall edema with moderate ascites noted.    SKELETAL: No aggressive appearing lytic/blastic lesion. No acute fractures, subluxations or dislocations.                               X-Ray Chest 1 View (Final result)  Result time 08/13/22 12:44:56    Final result  by Basilio De Oliveira MD (08/13/22 12:44:56)                 Impression:      Right greater than left pleural effusion with possible right lower lobe opacification.  Underlying infectious process is not excluded.  Recommend continued follow-up.      Electronically signed by: Basilio De Oliveira  Date:    08/13/2022  Time:    12:44             Narrative:    EXAMINATION:  XR CHEST 1 VIEW    CLINICAL HISTORY:  shortness of breath;    TECHNIQUE:  Single view of the chest    COMPARISON:  No prior imaging available for comparison.    FINDINGS:  Right greater than left pleural effusion with possible right lower lobe opacification.  Underlying infectious process is not excluded.  Recommend continued follow-up.                              Telemetry:          Physical Exam  HENT:      Mouth/Throat:      Mouth: Mucous membranes are moist.      Comments: Poor dentition  Cardiovascular:      Rate and Rhythm: Normal rate and regular rhythm.      Heart sounds: Normal heart sounds.   Pulmonary:      Breath sounds: Rales present.      Comments: Diminished breath sounds to right lung fields    + hemoptysis  Abdominal:      Palpations: Abdomen is soft.   Musculoskeletal:      Right lower leg: No edema.      Left lower leg: No edema.   Skin:     General: Skin is warm.   Neurological:      General: No focal deficit present.      Mental Status: He is alert.   Psychiatric:         Mood and Affect: Mood normal.         Current Inpatient Medications:    Current Facility-Administered Medications:     acetaminophen tablet 650 mg, 650 mg, Oral, Q4H PRN, Reginald Barker MD, 650 mg at 08/20/22 1311    albuterol-ipratropium 2.5 mg-0.5 mg/3 mL nebulizer solution 3 mL, 3 mL, Nebulization, Q4H PRN, Reginald Barker MD    aspirin chewable tablet 81 mg, 81 mg, Oral, Daily, ART De Oliveira, 81 mg at 08/21/22 0908    atorvastatin tablet 40 mg, 40 mg, Oral, QHS, Kwasi Thao MD, 40 mg at 08/21/22 2134    enoxaparin injection 80 mg, 80 mg,  Subcutaneous, Q12H, Kwasi Thao MD, 80 mg at 08/22/22 0447    famotidine tablet 20 mg, 20 mg, Oral, BID, Reginald Barker MD, 20 mg at 08/21/22 2134    furosemide injection 20 mg, 20 mg, Intravenous, Q12H, Kwasi Thao MD, 20 mg at 08/22/22 0447    LORazepam tablet 1 mg, 1 mg, Oral, Q4H PRN, Jose Manuel Natarajan MD, 1 mg at 08/21/22 1137    magnesium oxide tablet 400 mg, 400 mg, Oral, TID, Kwasi Thao MD, 400 mg at 08/21/22 2134    melatonin tablet 6 mg, 6 mg, Oral, Nightly PRN, Reginald Barker MD    promethazine tablet 25 mg, 25 mg, Oral, Q6H PRN, Reginald Barker MD    sodium chloride 0.9% flush 10 mL, 10 mL, Intravenous, PRN, Yuni Little MD    sodium chloride 0.9% flush 10 mL, 10 mL, Intravenous, PRN, Reginald Barker MD    traZODone tablet 50 mg, 50 mg, Oral, Nightly PRN, Alan Daigle MD, 50 mg at 08/21/22 2134         VTE Risk Mitigation (From admission, onward)         Ordered     enoxaparin injection 80 mg  Every 12 hours (non-standard times)         08/15/22 1314     IP VTE HIGH RISK PATIENT  Once         08/13/22 1523     Place sequential compression device  Until discontinued         08/13/22 1523     Place GIACOMO hose  Until discontinued         08/13/22 1523                Assessment:   CMO, unspecified  --EF 15%  Acute combined systolic and diastolic HF  --EF 15%; Grade III LVDD  --BNP 3280  --s/p right thoracentesis with 1030 fluid removal' f/u x-ray revealing small apical pneumothorax ? Element of trapped lung  NSTEMI, Type II s/t heart failure  --Troponin 0.134->0.120->0.105  Left BBB  LLL PE  Hemoptysis  Metabolic alkalosis  --worsening  ANNETTA  --resolved  COPD  Former smoker  No hx GIB    Plan:   Excellent UOP. Still overloaded. Metabolic alkalosis has worsened. Add diamox 250 mg daily. Continue  Lasix 20 mg IVP bid.   Start GDMT once closer to euvolemia  Ensure accurate I&O's/daily weights  Troponin elevation with flat trend likely s/t CMO. Will plan for coronary evaluation once  euvolemic pending stable renal function.   Continue aspirin and atorvastatin.  Will need to hold Lovenox due to hemoptysis. Would like pulmonary input regarding hemoptysis. Questionable need for bronch.   Lifevest will be needed prior to DC- order placed to case management  H/H downtrending. Monitor       ART De Oliveira  Cardiology  [facility] - 3rd Floor Medical Telemetry  08/22/2022

## 2022-08-23 ENCOUNTER — HOSPITAL ENCOUNTER (OUTPATIENT)
Dept: RADIOLOGY | Facility: HOSPITAL | Age: 57
Discharge: HOME | End: 2022-08-23

## 2022-08-23 LAB
ALBUMIN SERPL-MCNC: 2.8 GM/DL (ref 3.5–5)
ALBUMIN/GLOB SERPL: 0.7 RATIO (ref 1.1–2)
ALP SERPL-CCNC: 101 UNIT/L (ref 40–150)
ALT SERPL-CCNC: 34 UNIT/L (ref 0–55)
AST SERPL-CCNC: 43 UNIT/L (ref 5–34)
BASOPHILS # BLD AUTO: 0.02 X10(3)/MCL (ref 0–0.2)
BASOPHILS NFR BLD AUTO: 0.2 %
BILIRUBIN DIRECT+TOT PNL SERPL-MCNC: 0.5 MG/DL
BUN SERPL-MCNC: 16.2 MG/DL (ref 8.4–25.7)
CALCIUM SERPL-MCNC: 9.4 MG/DL (ref 8.4–10.2)
CHLORIDE SERPL-SCNC: 79 MMOL/L (ref 98–107)
CO2 SERPL-SCNC: 46 MMOL/L (ref 22–29)
CORRECTED TEMPERATURE (PCO2): 105 MMHG (ref 19–50)
CORRECTED TEMPERATURE (PH): 7.4 (ref 7.35–7.45)
CORRECTED TEMPERATURE (PO2): 93 MMHG (ref 80–100)
CREAT SERPL-MCNC: 0.56 MG/DL (ref 0.73–1.18)
EOSINOPHIL # BLD AUTO: 0.06 X10(3)/MCL (ref 0–0.9)
EOSINOPHIL NFR BLD AUTO: 0.6 %
ERYTHROCYTE [DISTWIDTH] IN BLOOD BY AUTOMATED COUNT: 13.3 % (ref 11.5–17)
GFR SERPLBLD CREATININE-BSD FMLA CKD-EPI: >60 MLS/MIN/1.73/M2
GLOBULIN SER-MCNC: 3.8 GM/DL (ref 2.4–3.5)
GLUCOSE SERPL-MCNC: 95 MG/DL (ref 74–100)
HCO3 UR-SCNC: 65 MMOL/L (ref 22–26)
HCT VFR BLD AUTO: 26.6 % (ref 42–52)
HGB BLD-MCNC: 7.3 G/DL (ref 12–16)
HGB BLD-MCNC: 7.9 GM/DL (ref 14–18)
IMM GRANULOCYTES # BLD AUTO: 0.05 X10(3)/MCL (ref 0–0.04)
IMM GRANULOCYTES NFR BLD AUTO: 0.5 %
LYMPHOCYTES # BLD AUTO: 1.11 X10(3)/MCL (ref 0.6–4.6)
LYMPHOCYTES NFR BLD AUTO: 10.2 %
MCH RBC QN AUTO: 30 PG (ref 27–31)
MCHC RBC AUTO-ENTMCNC: 29.7 MG/DL (ref 33–36)
MCV RBC AUTO: 101.1 FL (ref 80–94)
MONOCYTES # BLD AUTO: 1.06 X10(3)/MCL (ref 0.1–1.3)
MONOCYTES NFR BLD AUTO: 9.8 %
NEUTROPHILS # BLD AUTO: 8.6 X10(3)/MCL (ref 2.1–9.2)
NEUTROPHILS NFR BLD AUTO: 78.7 %
NRBC BLD AUTO-RTO: 0 %
PCO2 BLDA: 105 MMHG (ref 19–50)
PH SMN: 7.4 [PH] (ref 7.35–7.45)
PLATELET # BLD AUTO: 191 X10(3)/MCL (ref 130–400)
PMV BLD AUTO: 10.3 FL (ref 7.4–10.4)
PO2 BLDA: 93 MMHG (ref 80–100)
POC BASE DEFICIT: 36.1 MMOL/L (ref -2–2)
POC COHB: 2.2 %
POC IONIZED CALCIUM: 1.14 MMOL/L (ref 1.12–1.23)
POC METHB: 0.3 % (ref 0.4–1.5)
POC O2HB: 96.9 % (ref 94–97)
POC SATURATED O2: 97.2 %
POC TEMPERATURE: 37 °C
POTASSIUM BLD-SCNC: 3.6 MMOL/L (ref 3.5–5)
POTASSIUM SERPL-SCNC: 3.8 MMOL/L (ref 3.5–5.1)
PROT SERPL-MCNC: 6.6 GM/DL (ref 6.4–8.3)
RBC # BLD AUTO: 2.63 X10(6)/MCL (ref 4.7–6.1)
SODIUM BLD-SCNC: 134 MMOL/L (ref 137–145)
SODIUM SERPL-SCNC: 137 MMOL/L (ref 136–145)
SPECIMEN SOURCE: ABNORMAL
WBC # SPEC AUTO: 10.9 X10(3)/MCL (ref 4.5–11.5)

## 2022-08-23 PROCEDURE — 71045 X-RAY EXAM CHEST 1 VIEW: CPT | Mod: TC

## 2022-08-23 PROCEDURE — 99900035 HC TECH TIME PER 15 MIN (STAT)

## 2022-08-23 PROCEDURE — 82803 BLOOD GASES ANY COMBINATION: CPT

## 2022-08-23 PROCEDURE — 25000003 PHARM REV CODE 250: Performed by: INTERNAL MEDICINE

## 2022-08-23 PROCEDURE — 21400001 HC TELEMETRY ROOM

## 2022-08-23 PROCEDURE — 63600175 PHARM REV CODE 636 W HCPCS: Performed by: INTERNAL MEDICINE

## 2022-08-23 PROCEDURE — 27000221 HC OXYGEN, UP TO 24 HOURS

## 2022-08-23 PROCEDURE — 36600 WITHDRAWAL OF ARTERIAL BLOOD: CPT

## 2022-08-23 PROCEDURE — 87045 FECES CULTURE AEROBIC BACT: CPT | Performed by: INTERNAL MEDICINE

## 2022-08-23 PROCEDURE — 36415 COLL VENOUS BLD VENIPUNCTURE: CPT | Performed by: NURSE PRACTITIONER

## 2022-08-23 PROCEDURE — 85025 COMPLETE CBC W/AUTO DIFF WBC: CPT | Performed by: NURSE PRACTITIONER

## 2022-08-23 PROCEDURE — 25000003 PHARM REV CODE 250: Performed by: NURSE PRACTITIONER

## 2022-08-23 PROCEDURE — 80053 COMPREHEN METABOLIC PANEL: CPT | Performed by: NURSE PRACTITIONER

## 2022-08-23 RX ADMIN — ASPIRIN 81 MG CHEWABLE TABLET 81 MG: 81 TABLET CHEWABLE at 08:08

## 2022-08-23 RX ADMIN — FAMOTIDINE 20 MG: 20 TABLET, FILM COATED ORAL at 08:08

## 2022-08-23 RX ADMIN — ATORVASTATIN CALCIUM 40 MG: 40 TABLET, FILM COATED ORAL at 09:08

## 2022-08-23 RX ADMIN — FAMOTIDINE 20 MG: 20 TABLET, FILM COATED ORAL at 09:08

## 2022-08-23 RX ADMIN — TRAZODONE HYDROCHLORIDE 50 MG: 50 TABLET ORAL at 09:08

## 2022-08-23 RX ADMIN — FUROSEMIDE 20 MG: 10 INJECTION, SOLUTION INTRAMUSCULAR; INTRAVENOUS at 03:08

## 2022-08-23 RX ADMIN — FUROSEMIDE 20 MG: 10 INJECTION, SOLUTION INTRAMUSCULAR; INTRAVENOUS at 04:08

## 2022-08-23 RX ADMIN — LORAZEPAM 1 MG: 1 TABLET ORAL at 08:08

## 2022-08-23 NOTE — PT/OT/SLP PROGRESS
Occupational Therapy   Treatment    Name: Harpreet Rodas  MRN: [id number]    OT attempted to see pt for tx session. However, pt's RN and cardiology NP reported that pt is no longer appropriate for therapy services 2/2 pt's critical medical condition. OT signing off. Please re-consult if pt's status changes.

## 2022-08-23 NOTE — CONSULTS
The patient was alone at the time of my visit. He looked weak and tired. I provided pastoral care and presence. I offered him words of hope and comfort. I also prayed and anointed him. 08/23/22

## 2022-08-23 NOTE — PROGRESS NOTES
"[facility]  HOSPITAL MEDICINE PROGRESS NOTE      Patient Name: Harpreet Rodas  MRN: [id number]  Admission Date: 8/13/2022 12:32 PM  Status: IP- Inpatient   Length of Stay: 10 days  Face-to-Face encounter date: 08/23/2022       CHIEF COMPLAINT  Follow-up on respiratory failure and congestive heart failure      HOSPITAL COURSE  Patient is a 56-year-old white male with a past medical history of chronic obstructive pulmonary disease. The patient presented to [facility] on 8/13/2022 with a primary complaint of dyspnea on exertion, edema and chest pain.      Patient reports that for about 6 months he has had worsening dyspnea on exertion. Currently, just walking in his living room make him extremely short of breath. He started having swelling of his right arm and both legs 3 days ago. He started having diarrhea at the same time. He has lost weight. He says he gets hungry but feels full after a few bites and the food goes right through him. He came to the ER he began having left side chest pain, "a sticking sensation". He denies associated symptoms and it only lasts a short time. No radiation. No known history of CAD. Denies fever, chills or sweats. He quit smoking and drinking about 2 months ago after his wife had pneumonia and his breathing was worsening. He was drinking 4-5 40oz beers a week prior to quitting. The pt became very short of breath when trying to get into wheelchair for chest CT. However, he was able to tolerate CT and lay flat when transported by bed. He was given Lasix in the ED. He says he is feeling better. He is not on home oxygen. Only home med is an inhaler. Echocardiogram showed LVEF 15%, grade III left ventricular diastolic dysfunction, pulmonary hypertension, mild tricuspid regurgitation, moderate right atrial enlargement.    Patient was sitting up in bed watching television when I initially evaluated him this morning.  Nursing staff reported that he has intermittent anxiety " attacks attributed to dyspnea.  He was afebrile, hemodynamically stable and on supplemental oxygen at 2 liters/minute via nasal cannula.    At 2:15 p.m. on 08/21/2022, I received notification that the patient was having respiratory distress with a heart rate of 120, respiratory rate of 31 breaths per minute, a blood pressure of 130/73 and an oxygen saturation of 100% on supplemental oxygen at 10 liters/minute via OxyMask.  I ordered stat arterial blood gases and a chest x-ray.  Chest x-ray was interpreted as similar to previous.  Arterial blood gases appear somewhat similar to those obtained on 08/16/2022 with the exception that pO2 has improved and pCO2 has slightly increased however it is compensated as pH is only 7.36.    Interval history:  Patient sitting up in bed watching television.  He continues with some hemoptysis otherwise no major changes.  He refuses physical therapy and occupational therapy.  He absolutely refuses to discuss code status.  He remains dyspneic and orthopneic and is requiring supplemental oxygen at 4 liters/minute via nasal cannula.  He is hemodynamically stable.    OBJECTIVE    VITAL SIGNS: 24 HRS MIN & MAX LAST   Temp  Min: 97.3 °F (36.3 °C)  Max: 98.9 °F (37.2 °C) 97.6 °F (36.4 °C)   BP  Min: 101/35  Max: 137/85 137/85   Pulse  Min: 68  Max: 113  (!) 113   Resp  Min: 22  Max: 25 (!) 22   SpO2  Min: 84 %  Max: 100 % (!) 84 %       LABS/MICROBIOLOGY/MEDICATIONS/DIAGNOSTICS  I have reviewed all pertinent lab results within the past 24 hours.    Recent Labs   Lab 08/21/22  1000 08/22/22  0644 08/23/22  0715   WBC 8.6 10.6 10.9   RBC 2.88* 2.91* 2.63*   HGB 8.8* 8.8* 7.9*   HCT 28.9* 28.5* 26.6*   .3* 97.9* 101.1*   MCH 30.6 30.2 30.0   MCHC 30.4* 30.9* 29.7*   RDW 13.2 13.2 13.3    142 191   MPV 9.7 10.8* 10.3       Recent Labs   Lab 08/17/22  0537 08/17/22  1022 08/18/22  0419 08/19/22  0647 08/20/22  0905 08/21/22  1000 08/21/22  1505 08/22/22  0644 08/23/22  0715  08/23/22  1034     --  138 139   < > 136  --  137 137  --    K 3.8  --  3.8 3.6   < > 3.8  --  4.2 3.8  --    CO2 43*  --  39* 43*   < > 44*  --  46* 46*  --    BUN 19.0  --  16.0 14.2   < > 12.8  --  15.6 16.2  --    CREATININE 0.71*  --  0.69* 0.64*   < > 0.58*  --  0.57* 0.56*  --    CALCIUM 7.7*  --  8.0* 8.3*   < > 8.3*  --  9.0 9.4  --    PH  --   --   --   --   --   --  7.36  --   --  7.40   MG 1.90  --  2.00  --   --   --   --   --   --   --    ALBUMIN  --  2.5*  --  2.8*  --   --   --   --  2.8*  --    ALKPHOS  --   --   --  93  --   --   --   --  101  --    ALT  --   --   --  33  --   --   --   --  34  --    AST  --   --   --  45*  --   --   --   --  43*  --    BILITOT  --   --   --  1.0  --   --   --   --  0.5  --     < > = values in this interval not displayed.       Recent Labs     08/22/22 0644 08/23/22 0715   WBC 10.6 10.9   RBC 2.91* 2.63*   HGB 8.8* 7.9*   HCT 28.5* 26.6*   MCV 97.9* 101.1*   MCH 30.2 30.0   MCHC 30.9* 29.7*   RDW 13.2 13.3     Recent Labs     08/22/22 0644 08/23/22  0715   CHLORIDE 85* 79*   CO2 46* 46*   BUN 15.6 16.2   CREATININE 0.57* 0.56*   GLUCOSE 97 95   CALCIUM 9.0 9.4   ALBUMIN  --  2.8*   ALKPHOS  --  101   ALT  --  34   AST  --  43*       Microbiology Results (last 7 days)     Procedure Component Value Units Date/Time    Respiratory Culture [[id number]]  (Abnormal) Collected: 08/22/22 1705    Order Status: Completed Specimen: Sputum Updated: 08/23/22 0749     Respiratory Culture No Growth At 24 Hours     GRAM STAIN Quality 3+      Moderate Gram positive cocci      Moderate Gram Negative Rods      Few Gram negative diplococci    Stool Culture **CANNOT BE ORDERED STAT** [[id number]] Collected: 08/23/22 0500    Order Status: Resulted Specimen: Stool Updated: 08/23/22 0508    Body Fluid Culture [[id number]] Collected: 08/17/22 1052    Order Status: Completed Specimen: Body Fluid from Thoracentesis Fluid Updated: 08/22/22 0901     Body Fluid Culture Final Report: At 5  days. No growth    Blood Culture [[id number]]  (Normal) Collected: 08/13/22 1314    Order Status: Completed Specimen: Blood from Arm, Left Updated: 08/18/22 2102     CULTURE, BLOOD ([facility]) No Growth at 5 days    Blood Culture [[id number]]  (Normal) Collected: 08/13/22 1314    Order Status: Completed Specimen: Blood from Hand, Left Updated: 08/18/22 2102     CULTURE, BLOOD ([facility]) No Growth at 5 days    Gram Stain [[id number]] Collected: 08/17/22 1052    Order Status: Completed Specimen: Body Fluid from Pleural Fluid Updated: 08/18/22 0745     GRAM STAIN No WBCs, No bacteria seen     Gram Stain [[id number]] Collected: 08/17/22 1052    Order Status: Canceled Specimen: Thoracentesis Fluid Updated: 08/17/22 1242    Fungal Culture [[id number]] Collected: 08/17/22 1052    Order Status: Canceled Specimen: Thoracentesis Fluid Updated: 08/17/22 1135    Fungal Culture [[id number]] Collected: 08/17/22 1052    Order Status: Sent Specimen: Body Fluid from Pleural Fluid Updated: 08/17/22 1132    Mycobacteria and Nocardia Culture [[id number]] Collected: 08/17/22 1052    Order Status: Canceled Specimen: Body Fluid from Thoracentesis Fluid Updated: 08/17/22 1132    Mycobacteria and Nocardia Culture [[id number]] Collected: 08/17/22 1052    Order Status: Sent Specimen: Body Fluid from Thoracentesis Fluid Updated: 08/17/22 1131           X-Ray Chest 1 View  Narrative: EXAMINATION:  XR CHEST 1 VIEW    CPT 71427    CLINICAL HISTORY:  resp distress; Pneumonia, unspecified organism    COMPARISON:  August 21, 2022    FINDINGS:  Examination reveals mediastinal silhouette to be within normal limits cardiac silhouette is unchanged as compared with the previous exam.  There is blunting of the right costophrenic angle indicating the presence of a right-sided pleural effusion with compressive atelectatic changes small right apical pneumothorax is again identified.    On the left consolidative confluent airspace opacities are identified at the left base  including increased left retrocardiac density and silhouetting of the left hemidiaphragm no other focal consolidative changes.    Left pneumothorax is not clearly seen  Impression: No significant change as compared with the previous exam with persistent right apical pneumothorax.    Left pneumothorax is not clearly seen    Electronically signed by: Jefferson Blackman  Date:    08/23/2022  Time:    11:00        Scheduled Med:   aspirin  81 mg Oral Daily    atorvastatin  40 mg Oral QHS    famotidine  20 mg Oral BID    furosemide (LASIX) injection  20 mg Intravenous Q12H        Continuous Infusions:  None.    PRN Meds:  acetaminophen, albuterol-ipratropium, melatonin, promethazine, sodium chloride 0.9%, sodium chloride 0.9%, trazodone       PHYSICAL EXAM  General:  Cachectic chronically ill-appearing white male who appears much older than his stated age, in no respiratory distress with generalized edema  HENT: normocephalic, atraumatic, poor dentition  Eye: PERRL, EOMI, clear conjunctiva  Neck: full ROM, no thyromegaly  Respiratory: diminished breath sounds with bibasilar crackles  Cardiovascular: tachycardic with a regular rhythm, bilateral upper and lower extremity edema  Gastrointestinal: non-distended, positive bowel sounds, non-tender  Genitourinary: scrotal edema  Musculoskeletal: extensive generalized muscular atrophy  Integumentary: warm, dry, intact, no rashes  Neurological: cranial nerves grossly intact, no focal neurological deficit  Psychiatric: cooperative, anxious      ASSESSMENT  Acute hypoxic respiratory failure  Acute combined systolic and diastolic congestive heart failure left ventricular ejection fraction of 15% and grade III left ventricular diastolic dysfunction  Dilated cardiomyopathy  Left lower lobe acute pulmonary thromboembolism  Large right pleural effusion status post right thoracentesis 8/17/22  Persistent bilateral hydropneumothorax  Anasarca and ascites  Pulmonary  hypertension  Probable hepatic cirrhosis with long history of alcohol abuse  COPD with emphysema with long history of tobacco abuse  Macrocytic anemia  Type 2 NSTEMI  Cachexia  Compensated respiratory acidosis with severe contraction metabolic alkalosis  Hemoptysis      PLAN  CIS discontinued Lovenox due to hemoptysis  CIS continuing diuresis with intravenous Lasix 20 mg every 12 hours   I discontinued Zaroxolyn due to severe metabolic alkalosis  Continue supplemental oxygen to keep SaO2 greater than 90%  Blood cultures and urine culture are thus far negative  Continue aspirin and statin  CIS to start GDMT when euvolemic which is unlikely to happen any time soon if ever  Will need Lifevest prior to discharge if he ever goes home  Will need ischemic work up inpatient when euvolemic which is unlikely to happen any time soon if ever  Continue therapeutic dose Lovenox. Plan to switch to oral anticoagulation if he ever goes home  Bilateral upper and lower extremity Doppler ultrasounds were negative for deep or superficial vein thrombosis   HIV, Hepatitis panel, syphilis were all negative  Pulmonology ordered a CT of the abdomen and pelvis with contrast as part of a malignancy evaluation which was unrevealing  Patient has an extremely poor prognosis.  Requested an evaluation from palliative medicine.  Case was discussed with them today and unfortunately they are unable to make any progress regarding code status and end of life issues discussion.    DVT Prophylaxis:  Full-dose Lovenox has been discontinued due to hemoptysis    Critical Care Diagnosis: Acute combined systolic congestive heart failure requiring IV diuretics; acute hypoxic respiratory failure requiring supplemental oxygen  Critical Care Interventions: Hands-on evaluation, review of labs, radiographs, medical records, and discussion with the patient and medical staff in order to assess and manage the high probability of imminent or life-threatening  deterioration of cardio-respiratory status requiring vasopressor support and/or intubation and mechanical ventilation.  Critical Care Time Spent: 35 minutes      Patient condition:  Guarded with very poor long-term prognosis    Anticipated discharge and disposition:  Patient would best benefit from transfer to inpatient hospice as soon as possible  __________________________________________________________________________    All diagnosis and differential diagnosis have been reviewed; assessment and plan have been documented. I have personally reviewed the test results that are presently available; I have reviewed the patient's medication list. I have reviewed the consulting providers' recommendations. I have reviewed or attempted to review medical records based upon their availability.  All of the patient's questions have been addressed and answered. Patient is agreeable to the above stated plan. I will continue to monitor closely and make adjustment to medical management as needed.    This document was created with the assistance of a voice recognition software. There may be transcription errors as a result of using this technology, however minimal. Effort has been made to ensure accuracy of transcription but any obvious errors or omissions should be clarified with the author of this document.        Jose Manuel Natarajan MD   [facility]  08/23/2022

## 2022-08-23 NOTE — PROGRESS NOTES
[facility] - 3rd Floor Medical Telemetry  Cardiology  Progress Note    Patient Name: Harpreet Rodas  MRN: [id number]  Admission Date: 8/13/2022  Hospital Length of Stay: 10 days  Code Status: Full Code   Attending Provider: Ana Rosa Fry MD   Consulting Provider: ART De Oliveira  Primary Care Physician: Primary Doctor No  Principal Problem:Anasarca    Patient information was obtained from patient and ER records.     Subjective:     Chief Complaint:       HPI:   Mr. Mayen is a 55y/o male, unknown to [facility], with PMHx significant for COPD and previous heavy smoker/drinker who presented to [facility] via EMS for c/o SOB, MON, orthopnea, peripheral edema, diarrhea, and chest pain described as sticking sensation. No radiation or associated symptoms.  RA sat upon EMS arrival-88% and he was placed on Bipap. In ED, workup significant for BUN/creatinine 45/1.36, K 6.1, D-dimer 3.05, BNP 3280, Troponin 0.134-0.120-0.105. CXR revealing bilateral pleural effusions. Right> left with possible RLL opacification. CTA chest obtained LLL PE, large right pleural effusion, and findings of anasarca. EF revealing EF 15%, Grade III LVDD and RA/RV enlargement. He was placed on diuretics with consult placed to [facility] for PVC and 2nd degree Type II AVB. Review of tele reveals SR with non-conducted PACs. No 2nd degree heart block noted.     Hospital Course:  8/16/22: Patient awake in bed. Good diuresis with Lasix. Still volume overloaded.   8/17/22: Patient awake in bed. Continues to require O2 support. S/p right sided thoracentesis with reported removal of 1030 L straw colored fluid. CO2 rising. Patient started on diamox. Renal indices stable. -5.5L UOP x 24hr  8/18/22: Patient sitting up in bedside chair. Reports SOB and peripheral edema improving.   8/19/22: Patient awake in bed. NAD. Diminished breath sounds.   8/20/22: Patient awake in bed. NAD. Still orthopneic. Diminished breath sounds to Right lung fields. Edema continues  to recede.   8/21/22: Patient awake in bed. +SOB/orthopneic. Good diuresis with lasix.   8/22/22: Patient awake in bed. Continues to require O2 support and remains orthopneic. Breath sounds to right lung fields very diminished. Patient is coughing up large blood clots. Currently on weight based Lovenox BID.   8/23/22: Patient is somnolent this morning. He did receive a dose of Ativan overnight. ABGs reveal a compensated respiratory acidosis. PCO2  105. Pleural fluid from thoracentesis is transudative. He is still having hemoptysis of large dark red blood clots. Anemia worsening. Lovenox has been placed on hold due to hemoptysis and worsening anemia.     PMH: COPD  PSH: mandible fx surgery,  Family History: Mother- cancer; brother- cancer  Social History: previous heavy smoker (2ppd)/drinker- quit 6/22    Previous Cardiac Diagnostics:   Venous US BUE 08/14/22:  No evidence of deep or superficial vein thrombosis in bilateral upper extremities.     Venous US BLE 08/14/22:  Negative DVT to BLE    TTE 08/13/22:  Severely decreased Systolic function. Estimated EF 15%. Grade III LVDD. There is pulmonary HTN, Mild TR. Moderate RV enlargement. Moderate RA enlargement. Intermediate CVP 8 mmHg. Estimated PASP 50mmHg    Review of Systems   Unable to perform ROS: Mental status change       Objective:     Vital Signs (Most Recent):  Temp: 98.1 °F (36.7 °C) (08/23/22 0238)  Pulse: 94 (08/23/22 0238)  Resp: (!) 22 (08/23/22 0238)  BP: 124/68 (08/23/22 0238)  SpO2: 98 % (08/23/22 0238) Vital Signs (24h Range):  Temp:  [96.9 °F (36.1 °C)-98.4 °F (36.9 °C)] 98.1 °F (36.7 °C)  Pulse:  [] 94  Resp:  [20-25] 22  SpO2:  [94 %-100 %] 98 %  BP: (109-124)/(63-75) 124/68     Weight: 61.2 kg (134 lb 14.7 oz)  Body mass index is 18.82 kg/m².    SpO2: 98 %  O2 Device (Oxygen Therapy): nasal cannula w/ humidification      Intake/Output Summary (Last 24 hours) at 8/23/2022 0603  Last data filed at 8/22/2022 2200  Gross per 24 hour   Intake  --   Output 850 ml   Net -850 ml       Lines/Drains/Airways       Peripheral Intravenous Line  Duration                  Peripheral IV - Single Lumen 08/14/22 2000 Left;Posterior Forearm 8 days                    Significant Labs:  Recent Results (from the past 72 hour(s))   Basic Metabolic Panel    Collection Time: 08/20/22  9:05 AM   Result Value Ref Range    Sodium Level 140 136 - 145 mmol/L    Potassium Level 4.1 3.5 - 5.1 mmol/L    Chloride 90 (L) 98 - 107 mmol/L    Carbon Dioxide 44 (HH) 22 - 29 mmol/L    Glucose Level 146 (H) 74 - 100 mg/dL    Blood Urea Nitrogen 11.7 8.4 - 25.7 mg/dL    Creatinine 0.61 (L) 0.73 - 1.18 mg/dL    BUN/Creatinine Ratio 19     Calcium Level Total 8.3 (L) 8.4 - 10.2 mg/dL    Anion Gap 6.0 mEq/L    eGFR >60 mls/min/1.73/m2   CBC with Differential    Collection Time: 08/20/22  9:05 AM   Result Value Ref Range    WBC 8.9 4.5 - 11.5 x10(3)/mcL    RBC 3.38 (L) 4.70 - 6.10 x10(6)/mcL    Hgb 10.4 (L) 14.0 - 18.0 gm/dL    Hct 34.2 (L) 42.0 - 52.0 %    .2 (H) 80.0 - 94.0 fL    MCH 30.8 27.0 - 31.0 pg    MCHC 30.4 (L) 33.0 - 36.0 mg/dL    RDW 13.4 11.5 - 17.0 %    Platelet 155 130 - 400 x10(3)/mcL    MPV 10.1 7.4 - 10.4 fL    Neut % 76.4 %    Lymph % 13.9 %    Mono % 7.7 %    Eos % 1.1 %    Basophil % 0.2 %    Lymph # 1.23 0.6 - 4.6 x10(3)/mcL    Neut # 6.8 2.1 - 9.2 x10(3)/mcL    Mono # 0.68 0.1 - 1.3 x10(3)/mcL    Eos # 0.10 0 - 0.9 x10(3)/mcL    Baso # 0.02 0 - 0.2 x10(3)/mcL    IG# 0.06 (H) 0 - 0.04 x10(3)/mcL    IG% 0.7 %    NRBC% 0.0 %   Basic Metabolic Panel    Collection Time: 08/21/22 10:00 AM   Result Value Ref Range    Sodium Level 136 136 - 145 mmol/L    Potassium Level 3.8 3.5 - 5.1 mmol/L    Chloride 87 (L) 98 - 107 mmol/L    Carbon Dioxide 44 (HH) 22 - 29 mmol/L    Glucose Level 180 (H) 74 - 100 mg/dL    Blood Urea Nitrogen 12.8 8.4 - 25.7 mg/dL    Creatinine 0.58 (L) 0.73 - 1.18 mg/dL    BUN/Creatinine Ratio 22     Calcium Level Total 8.3 (L) 8.4 - 10.2 mg/dL    Anion  Gap 5.0 mEq/L    eGFR >60 mls/min/1.73/m2   CBC with Differential    Collection Time: 08/21/22 10:00 AM   Result Value Ref Range    WBC 8.6 4.5 - 11.5 x10(3)/mcL    RBC 2.88 (L) 4.70 - 6.10 x10(6)/mcL    Hgb 8.8 (L) 14.0 - 18.0 gm/dL    Hct 28.9 (L) 42.0 - 52.0 %    .3 (H) 80.0 - 94.0 fL    MCH 30.6 27.0 - 31.0 pg    MCHC 30.4 (L) 33.0 - 36.0 mg/dL    RDW 13.2 11.5 - 17.0 %    Platelet 159 130 - 400 x10(3)/mcL    MPV 9.7 7.4 - 10.4 fL    Neut % 78.0 %    Lymph % 12.0 %    Mono % 8.4 %    Eos % 0.9 %    Basophil % 0.2 %    Lymph # 1.03 0.6 - 4.6 x10(3)/mcL    Neut # 6.7 2.1 - 9.2 x10(3)/mcL    Mono # 0.72 0.1 - 1.3 x10(3)/mcL    Eos # 0.08 0 - 0.9 x10(3)/mcL    Baso # 0.02 0 - 0.2 x10(3)/mcL    IG# 0.04 0 - 0.04 x10(3)/mcL    IG% 0.5 %    NRBC% 0.0 %   POCT ARTERIAL BLOOD GAS    Collection Time: 08/21/22  3:05 PM   Result Value Ref Range    POC PH 7.36 7.35 - 7.45    POC PCO2 94 (AA) 19 - 50 mmHg    POC PO2 98 80 - 100 mmHg    POC HEMOGLOBIN 8.5 (A) 12 - 16 g/dL    POC SATURATED O2 97.4 %    POC O2Hb 95.7 94.0 - 97.0 %    POC COHb 2.1 %    POC MetHb 1.0 0.40 - 1.5 %    POC Potassium 4.0 3.5 - 5.0 mmol/l    POC Sodium 132 (A) 137 - 145 mmol/l    POC Ionized Calcium 1.10 (A) 1.12 - 1.23 mmol/l    Correct Temperature (PH) 7.36 7.35 - 7.45    Corrected Temperature (pCO2) 94 (AA) 19 - 50 mmHg    Corrected Temperature (pO2) 98 80 - 100 mmHg    POC HCO3 53.1 (A) 22.0 - 26.0 mmol/l    Base Deficit 24.2 (A) -2.00 - 2.00 mmol/l    POC Temp 37.0 °C    Specimen source Arterial sample    Basic Metabolic Panel    Collection Time: 08/22/22  6:44 AM   Result Value Ref Range    Sodium Level 137 136 - 145 mmol/L    Potassium Level 4.2 3.5 - 5.1 mmol/L    Chloride 85 (L) 98 - 107 mmol/L    Carbon Dioxide 46 (HH) 22 - 29 mmol/L    Glucose Level 97 74 - 100 mg/dL    Blood Urea Nitrogen 15.6 8.4 - 25.7 mg/dL    Creatinine 0.57 (L) 0.73 - 1.18 mg/dL    BUN/Creatinine Ratio 27     Calcium Level Total 9.0 8.4 - 10.2 mg/dL    Anion Gap  6.0 mEq/L    eGFR >60 mls/min/1.73/m2   CBC with Differential    Collection Time: 08/22/22  6:44 AM   Result Value Ref Range    WBC 10.6 4.5 - 11.5 x10(3)/mcL    RBC 2.91 (L) 4.70 - 6.10 x10(6)/mcL    Hgb 8.8 (L) 14.0 - 18.0 gm/dL    Hct 28.5 (L) 42.0 - 52.0 %    MCV 97.9 (H) 80.0 - 94.0 fL    MCH 30.2 27.0 - 31.0 pg    MCHC 30.9 (L) 33.0 - 36.0 mg/dL    RDW 13.2 11.5 - 17.0 %    Platelet 142 130 - 400 x10(3)/mcL    MPV 10.8 (H) 7.4 - 10.4 fL    Neut % 74.8 %    Lymph % 13.8 %    Mono % 9.8 %    Eos % 0.7 %    Basophil % 0.3 %    Lymph # 1.46 0.6 - 4.6 x10(3)/mcL    Neut # 7.9 2.1 - 9.2 x10(3)/mcL    Mono # 1.03 0.1 - 1.3 x10(3)/mcL    Eos # 0.07 0 - 0.9 x10(3)/mcL    Baso # 0.03 0 - 0.2 x10(3)/mcL    IG# 0.06 (H) 0 - 0.04 x10(3)/mcL    IG% 0.6 %    NRBC% 0.0 %       Significant Imaging:  Imaging Results              CTA Chest Abdomen Non Coronary (Final result)  Result time 08/13/22 15:55:52   Procedure changed from CTA Chest Non-Coronary (PE Study)     Final result by Basilio De Oliveira MD (08/13/22 15:55:52)                   Impression:      Left lower lobe pulmonary thromboembolism is noted.    Large right pleural effusion is present.    Findings of anasarca.    The bladder wall is prominent.  Correlate with urinalysis.    There are cortical defects of the bilateral kidneys likely related to chronic renal disease and scarring, however infarcts are less likely.    The liver appears heterogeneous which may be related to phase of contrast however is cirrhosis is not excluded.  Cardiomegaly is present.  Further evaluation may be obtained with ECHO.    Findings reported to Dr. Little prior to interpretation.      Electronically signed by: Basilio De Oliveira  Date:    08/13/2022  Time:    15:55               Narrative:    EXAMINATION:  CTA CHEST ABDOMEN NON CORONARY (XPD)    CLINICAL HISTORY:  Pulmonary embolism (PE) suspected, positive D-dimer;    TECHNIQUE:  Axial CTA images of the chest, abdomen, and pelvis were  obtained With Contrast. Sagittal and coronal reconstructed images were available for review.    Automatic exposure control was utilized to reduce the patient's radiation dose.    DLP = 582    COMPARISON:  No prior images available for comparison.    FINDINGS:  PULMONARY ARTERY: Thrombus is identified in the left lower lobe pulmonary artery.    AORTA: The thoracoabdominal aorta is normal in course and caliber. Scattered atherosclerotic disease is noted.    HEART: The heart is enlarged.  No pericardial effusion.    THYROID GLAND: The thyroid is not enlarged. There are no nodules identified.    AIRWAYS: Trachea is midline and tracheobronchial tree is patent.    LUNGS: Large right effusion with subsegmental atelectatic changes at the right base.  Emphysematous changes throughout the lungs.    THROACIC LYMPH NODES: There is no significant mediastinal, axillary or hilar lymphadenopathy.    HEPATOBILIARY: Somewhat nodular contour of the superior aspect of the liver with some heterogeneity may be related to phase of contrast versus cirrhosis.  Correlate with patient's history.  The gallbladder is normal.    SPLEEN: Normal    PANCREAS: No focal masses or ductal dilatation.    ADRENALS: No adrenal nodules.    KIDNEYS: No evidence of hydronephrosis.  Bilateral renal cortical perfusional defects likely related to scarring in chronic kidney disease.  Correlate with patient's history.  Less likely infarcts.    ABDOMINAL LYMPHADENOPATHY/RETROPERITONEUM: There is no retroperitoneal lymphadenopathy.    BOWEL: No acute bowel related abnormalities.    PELVIC VISCERA: The bladder wall is somewhat prominent.  Correlate with urinalysis.    PELVIC LYMPH NODES: No lymphadenopathy.    PERITONEUM/ BODY WALL: Diffuse body wall edema with moderate ascites noted.    SKELETAL: No aggressive appearing lytic/blastic lesion. No acute fractures, subluxations or dislocations.                                       X-Ray Chest 1 View (Final result)   Result time 08/13/22 12:44:56      Final result by Basilio De Oliveira MD (08/13/22 12:44:56)                   Impression:      Right greater than left pleural effusion with possible right lower lobe opacification.  Underlying infectious process is not excluded.  Recommend continued follow-up.      Electronically signed by: Basilio De Oliveira  Date:    08/13/2022  Time:    12:44               Narrative:    EXAMINATION:  XR CHEST 1 VIEW    CLINICAL HISTORY:  shortness of breath;    TECHNIQUE:  Single view of the chest    COMPARISON:  No prior imaging available for comparison.    FINDINGS:  Right greater than left pleural effusion with possible right lower lobe opacification.  Underlying infectious process is not excluded.  Recommend continued follow-up.                                    Telemetry:          Physical Exam  HENT:      Mouth/Throat:      Mouth: Mucous membranes are dry.      Comments: Poor dentition  Cardiovascular:      Rate and Rhythm: Regular rhythm. Tachycardia present.      Heart sounds: Normal heart sounds.   Pulmonary:      Breath sounds: Rales present.      Comments: Diminished breath sounds to right lung fields    + hemoptysis  Abdominal:      Palpations: Abdomen is soft.   Musculoskeletal:      Right lower leg: No edema.      Left lower leg: No edema.   Skin:     General: Skin is warm.   Neurological:      Comments: Luzmaria due to somnolence         Current Inpatient Medications:    Current Facility-Administered Medications:     acetaminophen tablet 650 mg, 650 mg, Oral, Q4H PRN, Reginald Barker MD, 650 mg at 08/20/22 1311    albuterol-ipratropium 2.5 mg-0.5 mg/3 mL nebulizer solution 3 mL, 3 mL, Nebulization, Q4H PRN, Reginald Barker MD    aspirin chewable tablet 81 mg, 81 mg, Oral, Daily, Jaylin Grissom, ART, 81 mg at 08/22/22 0828    atorvastatin tablet 40 mg, 40 mg, Oral, QHS, Kwasi Thao MD, 40 mg at 08/22/22 2055    enoxaparin injection 80 mg, 80 mg, Subcutaneous, Q12H, Kwasi Thao MD, 80  mg at 08/22/22 0447    famotidine tablet 20 mg, 20 mg, Oral, BID, Reginald Barker MD, 20 mg at 08/22/22 2055    furosemide injection 20 mg, 20 mg, Intravenous, Q12H, Kwasi Thao MD, 20 mg at 08/23/22 0343    LORazepam tablet 1 mg, 1 mg, Oral, Q4H PRN, Jose Manuel Natarajan MD, 1 mg at 08/21/22 1137    magnesium oxide tablet 400 mg, 400 mg, Oral, TID, Kwasi Thao MD, 400 mg at 08/22/22 2055    melatonin tablet 6 mg, 6 mg, Oral, Nightly PRN, Reginald Barker MD    promethazine tablet 25 mg, 25 mg, Oral, Q6H PRN, Reginald Barker MD    sodium chloride 0.9% flush 10 mL, 10 mL, Intravenous, PRN, Yuni Little MD    sodium chloride 0.9% flush 10 mL, 10 mL, Intravenous, PRN, Reginald Barker MD    traZODone tablet 50 mg, 50 mg, Oral, Nightly PRN, Alan Daigle MD, 50 mg at 08/21/22 2134         VTE Risk Mitigation (From admission, onward)           Ordered     enoxaparin injection 80 mg  Every 12 hours (non-standard times)         08/15/22 1314     IP VTE HIGH RISK PATIENT  Once         08/13/22 1523     Place sequential compression device  Until discontinued         08/13/22 1523     Place GIACOMO hose  Until discontinued         08/13/22 1523                    Assessment:   CMO, unspecified  --EF 15%  Acute combined systolic and diastolic HF  --EF 15%; Grade III LVDD  --BNP 3280  --s/p right thoracentesis with 1030 fluid removal (transudative)  NSTEMI, Type II s/t heart failure  --Troponin 0.134->0.120->0.105  Left BBB  LLL PE  Hemoptysis  Anemia  --worsening   Metabolic alkalosis  --worsening  ANNETTA  --resolved  COPD  Former smoker  No hx GIB    Plan:    Still overloaded but diuresis limited by worsening Metabolic alkalosis as compensation for respiratory acidosis.    Switch to Lasix 20 mg po bid.   Start GDMT once closer to euvolemia  Ensure accurate I&O's/daily weights  Will plan for coronary evaluation once euvolemic pending stable renal function.   Continue aspirin and atorvastatin.  Will need to hold Lovenox due to  hemoptysis. Would like pulmonary input regarding hemoptysis. Questionable need for bronch.   Lifevest will be needed prior to DC- order placed to case management  H/H downtrending. Monitor     Patient has poor prognosis and condition is guarded  ART De Oliveira  Cardiology  [facility] - 3rd Floor Medical Telemetry  08/23/2022

## 2022-08-23 NOTE — PROGRESS NOTES
[facility] - 3rd Floor Medical Telemetry  Pulmonary Critical Care Note    Patient Name: Harpreet Rodas  MRN: [id number]  Admission Date: 8/13/2022  Hospital Length of Stay: 10 days  Code Status: Full Code  Attending Provider: Ana Rosa Fry MD  Primary Care Provider: Primary Doctor No     Subjective:     HPI:   This is a 56-year-old male who was admitted to [facility] on 08/13/2022 for complaints of worsening shortness of breath which has been ongoing over the last several months left-sided chest pain, and significant swelling of his right arm and bilateral feet.  Patient has history of drinking and smoking quit approximately 2 months ago prior to that was drinking 4-5 40 oz beer a weekly.  Workup revealed significantly elevated BNP of 3691.5, troponin 0.134, and CT imaging of the chest with a left lower lobe pulmonary thrombus, large right pleural effusion, and emphysematous changes throughout the lungs.  Pulmonary is being consulted for consideration of thoracentesis.  Patient was initiated on Lovenox b.i.d. secondary to pulmonary emboli. TTE obtained. Further conversation revealed patient had been experiencing difficulty breathing approximately 6 months ago after inhalation of a combination of (murratic acid, sulfuric acid, bleach) while at work as a . Has been utilizing albuterol inhaler x2-3/day. Tried Trilegy x14 days without much relief. Approximately 3 months ago he began experiencing swelling in his B/L lower extremities in addition to early satiety.  He denied any associated weight loss stating he has always been a very thin person or fever/night sweats.  Denies any prior blood clots in legs/arm/lungs or any known cancers.  Mother and brother both passed away from unspecified cancer.      Patient has been exposed to a variety of pulmonary irritants/carcinogenic agents as listed below;  - Tobacco Use (approximately x70 pack years; quit x3 months ago)  - Asbestos (Approximately  10-15 years)  - Sandblasting  - Glenn dust  - Harsh chemicals (Murratic Acid, Sulfuric Acid, Bleach; without appropriate respirator use)    Hospital Course/Significant events:  - Thoracentesis performed (22); patient tolerated well              -- 1030 cc straw colored fluid removed              -- Pending pleural fluid studies for further diagnostics              -- Post-procedural CXR (x2) w/ right-sided trapped lung  - Continue FD Lovenox in setting of left pulmonary embolism  - HIV/Hepatitis Panel/Syphillis (all resulted negative)  - B/L LE & RUE U/S NIVA (negative)  - TTE w/ EF of 15% w/ diastolic dysfunction    24 Hour Interval History:  Patient is lethargic this morning. Repeat ABG shows 7.40/105/93/65, patient is compensated. Patient did receive 1mg of Ativan this morning which likely is contributing to his hypersomnolence. Oxyzmask discontinued as he is a chronic CO2 retainer and we do not want to kill is respiratory drive. Patient is net negative -850cc over the last 24 hours.    Past Medical History:   Diagnosis Date    COPD (chronic obstructive pulmonary disease)        Past Surgical History:   Procedure Laterality Date    MANDIBLE FRACTURE SURGERY         Social History     Socioeconomic History    Marital status:    Tobacco Use    Smoking status: Former Smoker     Packs/day: 2.00     Quit date: 2022     Years since quittin.1    Smokeless tobacco: Never Used   Substance and Sexual Activity    Alcohol use: Not Currently     Comment: quit 2 months ago    Drug use: Never       Current Outpatient Medications   Medication Instructions    albuterol sulfate (INV ALBUTEROL) 90 mcg inhalation Inhalation, As needed (PRN), Take one puff by mouth as directed by Physician.       Current Inpatient Medications   aspirin  81 mg Oral Daily    atorvastatin  40 mg Oral QHS    famotidine  20 mg Oral BID    furosemide (LASIX) injection  20 mg Intravenous Q12H    magnesium oxide  400 mg  Oral TID       Review of Systems   All other systems reviewed and are negative.        Objective:       Intake/Output Summary (Last 24 hours) at 8/23/2022 0756  Last data filed at 8/22/2022 2200  Gross per 24 hour   Intake --   Output 850 ml   Net -850 ml         Vital Signs (Most Recent):  Temp: 98.9 °F (37.2 °C) (08/23/22 0718)  Pulse: 106 (08/23/22 0718)  Resp: (!) 22 (08/23/22 0238)  BP: (!) 101/35 (08/23/22 0718)  SpO2: 100 % (08/23/22 0718)  Body mass index is 18.82 kg/m².  Weight: 61.2 kg (134 lb 14.7 oz) Vital Signs (24h Range):  Temp:  [96.9 °F (36.1 °C)-98.9 °F (37.2 °C)] 98.9 °F (37.2 °C)  Pulse:  [] 106  Resp:  [20-25] 22  SpO2:  [94 %-100 %] 100 %  BP: (101-124)/(35-75) 101/35     Physical Exam  Constitutional:       General: He is not in acute distress.     Appearance: He is ill-appearing. He is not toxic-appearing.      Comments: Hypersomnolence, cachetic gentleman   HENT:      Head: Normocephalic and atraumatic.      Mouth/Throat:      Mouth: Mucous membranes are moist.      Pharynx: Oropharynx is clear. No oropharyngeal exudate or posterior oropharyngeal erythema.   Eyes:      General: No scleral icterus.  Cardiovascular:      Rate and Rhythm: Normal rate and regular rhythm.      Heart sounds: Normal heart sounds.   Pulmonary:      Effort: Pulmonary effort is normal.      Breath sounds: Decreased breath sounds present.   Musculoskeletal:      Right lower leg: Edema present.      Left lower leg: Edema present.      Comments: 1+ edema to BLE; Right arm with significant edema-  improving.   Neurological:      General: No focal deficit present.      Mental Status: He is alert and oriented to person, place, and time.   Psychiatric:         Mood and Affect: Mood normal.         Behavior: Behavior normal.       Lines/Drains/Airways     Peripheral Intravenous Line  Duration                Peripheral IV - Single Lumen 08/14/22 2000 Left;Posterior Forearm 8 days                Significant Labs:    Lab  Results   Component Value Date    WBC 10.6 08/22/2022    HGB 8.8 (L) 08/22/2022    HCT 28.5 (L) 08/22/2022    MCV 97.9 (H) 08/22/2022     08/22/2022   BNP of 3691.5, troponin 0.134      BMP  Lab Results   Component Value Date     08/22/2022    K 4.2 08/22/2022    CO2 46 (HH) 08/22/2022    BUN 15.6 08/22/2022    CREATININE 0.57 (L) 08/22/2022    CALCIUM 9.0 08/22/2022   Magnesium 1.5, phosphorus 5.7    ABG  Recent Labs   Lab 08/21/22  1505   PH 7.36   PO2 98   PCO2 94*   HCO3 53.1*       Significant Imaging:  X-Ray Chest 1 View  Narrative: EXAMINATION:  XR CHEST 1 VIEW    CLINICAL HISTORY:  sob;    TECHNIQUE:  One view    COMPARISON:  August 20, 2022.    FINDINGS:  Cardiopericardial silhouette is within normal limits.  Right hydropneumothorax is without significant interval change.  Left hydropneumothorax is again without significant interval change.  Bilateral lungs parenchymal coarsening reflect congestive infiltrative process show similar appearance.  Impression: Bilateral hydropneumothorax, unchanged.    Electronically signed by: Stephon Solis  Date:    08/21/2022  Time:    15:12      Assessment/Plan:     Assessment  1. Left Lower Lobe Pulmonary Embolism  2. Large Right-sided pleural effusion w/ Atelectasis - improved s/p thoracentesis (8/17/22)  3. HFrEF (EF of 15%, 8/2022) w/ elevated BNP  4. Compensated Respiratory Acidosis w/ contraction alkalosis  5. RUE Edema  6. Hx of COPD (unquantified)  7. History of tobacco and alcohol abuse  8. Hx of Multiple Pulmonary Irritants as listed in above HPI  9. Cachectic w/ x3 months early satiety  10. Hypercapnia - Compensated     Plan  - Pleural fluid study results from thoracentesis on 8/17/22 shows transudate effusion  - Continue FD Lovenox in setting of left pulmonary embolism, monitor for further episodes of hematemesis  - Sputum culture shows moderate gram positive cocci, moderate gram negative rods, and few gram negative diplococci   - CT abd/pelvis  negative for signs of malignancy    - Continue diuresis as tolerated; currently Lasix 20mg Q12h; recommendations per cardiology  - Volume status is negative 850cc last 24 hours and 14.7 liters total  -ABG shows 7.40/105/93/65, but is compensated, avoid over oxygenating patient is at risk of killing resp drive  - Discontinued ativan and sedating medications     Harpreet Mancuso, DO   Pulmonary Critical Care Medicine

## 2022-08-24 ENCOUNTER — HOSPITAL ENCOUNTER (OUTPATIENT)
Dept: RADIOLOGY | Facility: HOSPITAL | Age: 57
Discharge: HOME | End: 2022-08-24

## 2022-08-24 LAB
ANION GAP SERPL CALC-SCNC: 13 MEQ/L
BACTERIA SPEC CULT: ABNORMAL
BASOPHILS # BLD AUTO: 0.04 X10(3)/MCL (ref 0–0.2)
BASOPHILS NFR BLD AUTO: 0.4 %
BUN SERPL-MCNC: 21.7 MG/DL (ref 8.4–25.7)
CALCIUM SERPL-MCNC: 9.4 MG/DL (ref 8.4–10.2)
CHLORIDE SERPL-SCNC: 78 MMOL/L (ref 98–107)
CO2 SERPL-SCNC: 49 MMOL/L (ref 22–29)
CREAT SERPL-MCNC: 0.61 MG/DL (ref 0.73–1.18)
CREAT/UREA NIT SERPL: 36
EOSINOPHIL # BLD AUTO: 0.05 X10(3)/MCL (ref 0–0.9)
EOSINOPHIL NFR BLD AUTO: 0.5 %
ERYTHROCYTE [DISTWIDTH] IN BLOOD BY AUTOMATED COUNT: 13.2 % (ref 11.5–17)
GFR SERPLBLD CREATININE-BSD FMLA CKD-EPI: >60 MLS/MIN/1.73/M2
GLUCOSE SERPL-MCNC: 145 MG/DL (ref 74–100)
GRAM STN SPEC: ABNORMAL
HCT VFR BLD AUTO: 24.8 % (ref 42–52)
HGB BLD-MCNC: 7.9 GM/DL (ref 14–18)
IMM GRANULOCYTES # BLD AUTO: 0.08 X10(3)/MCL (ref 0–0.04)
IMM GRANULOCYTES NFR BLD AUTO: 0.8 %
LYMPHOCYTES # BLD AUTO: 0.99 X10(3)/MCL (ref 0.6–4.6)
LYMPHOCYTES NFR BLD AUTO: 9.5 %
MCH RBC QN AUTO: 31.5 PG (ref 27–31)
MCHC RBC AUTO-ENTMCNC: 31.9 MG/DL (ref 33–36)
MCV RBC AUTO: 98.8 FL (ref 80–94)
MONOCYTES # BLD AUTO: 0.9 X10(3)/MCL (ref 0.1–1.3)
MONOCYTES NFR BLD AUTO: 8.6 %
NEUTROPHILS # BLD AUTO: 8.4 X10(3)/MCL (ref 2.1–9.2)
NEUTROPHILS NFR BLD AUTO: 80.2 %
NRBC BLD AUTO-RTO: 0 %
PLATELET # BLD AUTO: 226 X10(3)/MCL (ref 130–400)
PMV BLD AUTO: 9.6 FL (ref 7.4–10.4)
POTASSIUM SERPL-SCNC: 3.8 MMOL/L (ref 3.5–5.1)
RBC # BLD AUTO: 2.51 X10(6)/MCL (ref 4.7–6.1)
SODIUM SERPL-SCNC: 140 MMOL/L (ref 136–145)
WBC # SPEC AUTO: 10.4 X10(3)/MCL (ref 4.5–11.5)

## 2022-08-24 PROCEDURE — 21400001 HC TELEMETRY ROOM

## 2022-08-24 PROCEDURE — 80048 BASIC METABOLIC PNL TOTAL CA: CPT | Performed by: INTERNAL MEDICINE

## 2022-08-24 PROCEDURE — 71045 X-RAY EXAM CHEST 1 VIEW: CPT | Mod: TC

## 2022-08-24 PROCEDURE — 85025 COMPLETE CBC W/AUTO DIFF WBC: CPT | Performed by: INTERNAL MEDICINE

## 2022-08-24 PROCEDURE — 27000221 HC OXYGEN, UP TO 24 HOURS

## 2022-08-24 PROCEDURE — 63600175 PHARM REV CODE 636 W HCPCS: Performed by: NURSE PRACTITIONER

## 2022-08-24 PROCEDURE — 25000003 PHARM REV CODE 250: Performed by: INTERNAL MEDICINE

## 2022-08-24 PROCEDURE — 36415 COLL VENOUS BLD VENIPUNCTURE: CPT | Performed by: INTERNAL MEDICINE

## 2022-08-24 PROCEDURE — 63600175 PHARM REV CODE 636 W HCPCS: Performed by: INTERNAL MEDICINE

## 2022-08-24 PROCEDURE — 94761 N-INVAS EAR/PLS OXIMETRY MLT: CPT

## 2022-08-24 PROCEDURE — 25000003 PHARM REV CODE 250: Performed by: NURSE PRACTITIONER

## 2022-08-24 RX ORDER — FUROSEMIDE 10 MG/ML
40 INJECTION INTRAMUSCULAR; INTRAVENOUS
Status: DISCONTINUED | OUTPATIENT
Start: 2022-08-24 | End: 2022-08-26 | Stop reason: HOSPADM

## 2022-08-24 RX ADMIN — FUROSEMIDE 40 MG: 10 INJECTION, SOLUTION INTRAMUSCULAR; INTRAVENOUS at 10:08

## 2022-08-24 RX ADMIN — ASPIRIN 81 MG CHEWABLE TABLET 81 MG: 81 TABLET CHEWABLE at 08:08

## 2022-08-24 RX ADMIN — FUROSEMIDE 40 MG: 10 INJECTION, SOLUTION INTRAMUSCULAR; INTRAVENOUS at 11:08

## 2022-08-24 RX ADMIN — ATORVASTATIN CALCIUM 40 MG: 40 TABLET, FILM COATED ORAL at 08:08

## 2022-08-24 RX ADMIN — FAMOTIDINE 20 MG: 20 TABLET, FILM COATED ORAL at 08:08

## 2022-08-24 RX ADMIN — FUROSEMIDE 20 MG: 10 INJECTION, SOLUTION INTRAMUSCULAR; INTRAVENOUS at 04:08

## 2022-08-24 RX ADMIN — TRAZODONE HYDROCHLORIDE 50 MG: 50 TABLET ORAL at 08:08

## 2022-08-24 NOTE — CONSULTS
Inpatient consult to Palliative Care  Consult performed by: Flaquita Mcnulty RN  Consult ordered by: Ana Rosa Fry MD        Patient Name: Harpreet Rodas   MRN: [id number]   Admission Date: 8/13/2022   Hospital Length of Stay: 11   Attending Provider: Ana Rosa Fry MD   Consulting Provider: Flaquita Mcnulty RN  Reason for Consult: Goals of Care  Primary Care Physician:  Primary Doctor No     Principal Problem: Anasarca     Patient information was obtained from patient and primary team.     Final diagnoses:  [R07.9] Chest pain  [J18.9] Pneumonia of right lower lobe due to infectious organism (Primary)  [A41.9] Sepsis, due to unspecified organism, unspecified whether acute organ dysfunction present  [R60.1] Anasarca  [I50.9] Acute congestive heart failure, unspecified heart failure type  [E87.5] Hyperkalemia  [I26.99] Pulmonary embolism, unspecified chronicity, unspecified pulmonary embolism type, unspecified whether acute cor pulmonale present  [R60.0] Edema of right upper extremity  [R60.0] Bilateral lower extremity edema      Assessment/Plan:     I reviewed the patient's understanding of the seriousness of the illness and its expected prognosis. We discussed the patient's goals of care and treatment preferences. We discussed the difference between palliative and curative medicine. I explained the differences between home health, palliative and hospice care. I clarified current code status. I identified the surrogate decision maker or health care POA. I explained the difference between a living will (advanced directive) and LaPost. We discussed the patient's chosen code status as listed above and the contents of the LaPOST. I answered all questions and we formulated a plan including recommendations for symptom management and how to best achieve goals of care.    I reviewed the patient's current clinical status with the nurse and reviewed clinical documentation, labs and imaging.      Mr. Rodas is in hospital bed in  "with HOB at nearly a 90 degree angle. He is wearing O2 via simple mask at 12 LPM. He has SOB with talking, or with leaning forward. We discussed surrogate decision maker. He states that he involves his wife in the decision making. He states that she is 16 years older than him, but that he's "a lot spunkier than I am."  We discussed his wishes regarding resuscitation and he states that he doesn't want to be on machines. DNR code status was updated this morning by MARCELLA Little MD. We discussed the LaPOST and allowing him to put his wishes on paper which he could keep in his possession for when he leaves the hospital. He states that he wants to talk it over with his wife before we fill out the paper. We discussed GIP hospice care, home hospice care, and outpatient palliative care. He states he does not want to come back to the hospital, he would like to have a nurse come to his house to check on him and order his meds, etc. Update given to primary nurse and to CM. Palliative will continue to follow as needed.    Symptom Review: Denies pain, nausea, vomiting, diarrhea, and constipation. He reports that he has been spitting up blood clots for the past couple of days, and that this morning he spit up a "really large blood clot" and since then he has not had coughing or spitting up blood. He reports that he is sleeping well. He denies depression and anxiety. The only symptom he reports is SOB which is controlled as well as possible with O2 and DuoNebs treatments.    Code Status: DNR    Disposition Patient wishes to go home by Monday. He is speaking with hospice today.    Interval History:   Remains dyspneic at rest/with talking - on O2 12 LPM via mask.Continues on Lasix 40 mg IV bid. The patient reports that his swelling is improving. BLE remain with 2+-3+ pitting edema. TTE shows severely decreased systolic function with 15% EF. Palliative will continue to follow to assist with GOC.      Active Ambulatory Problems     " "Diagnosis Date Noted    No Active Ambulatory Problems     Resolved Ambulatory Problems     Diagnosis Date Noted    No Resolved Ambulatory Problems     Past Medical History:   Diagnosis Date    COPD (chronic obstructive pulmonary disease)         Past Surgical History:   Procedure Laterality Date    MANDIBLE FRACTURE SURGERY          Review of patient's allergies indicates:  No Known Allergies       Current Facility-Administered Medications:     acetaminophen tablet 650 mg, 650 mg, Oral, Q4H PRN, Reginald Barker MD, 650 mg at 08/20/22 1311    albuterol-ipratropium 2.5 mg-0.5 mg/3 mL nebulizer solution 3 mL, 3 mL, Nebulization, Q4H PRN, Reginald Barker MD    aspirin chewable tablet 81 mg, 81 mg, Oral, Daily, ART De Oliveira, 81 mg at 08/24/22 0858    atorvastatin tablet 40 mg, 40 mg, Oral, QHS, Kwasi Thao MD, 40 mg at 08/23/22 2124    famotidine tablet 20 mg, 20 mg, Oral, BID, Reginald Barker MD, 20 mg at 08/24/22 0858    furosemide injection 40 mg, 40 mg, Intravenous, Q12H, ART De Oliveira    melatonin tablet 6 mg, 6 mg, Oral, Nightly PRN, Reginald Barker MD    promethazine tablet 25 mg, 25 mg, Oral, Q6H PRN, Reginald Barker MD    sodium chloride 0.9% flush 10 mL, 10 mL, Intravenous, PRN, Yuni Little MD    sodium chloride 0.9% flush 10 mL, 10 mL, Intravenous, PRN, Reginald Barker MD    traZODone tablet 50 mg, 50 mg, Oral, Nightly PRN, Alan Daigle MD, 50 mg at 08/23/22 2124     acetaminophen, albuterol-ipratropium, melatonin, promethazine, sodium chloride 0.9%, sodium chloride 0.9%, trazodone     Family History   Problem Relation Age of Onset    Cancer Mother     Cancer Brother           Review of Systems   Constitutional: Positive for fatigue.   Respiratory: Positive for shortness of breath.             Objective:   /74   Pulse (!) 111   Temp 98.7 °F (37.1 °C) (Oral)   Resp (!) 22   Ht 5' 11" (1.803 m)   Wt 54.8 kg (120 lb 13 oz)   SpO2 (!) 90%   BMI 16.85 kg/m²  "     Physical Exam   Constitutional: He appears toxic. He appears ill.   Neurological: He is alert.          Review of Symptoms  Review of Symptoms    Symptom Assessment (ESAS 0-10 Scale)  Pain:  0  Dyspnea:  0  Anxiety:  0  Nausea:  0  Depression:  0  Anorexia:  0  Fatigue:  0  Insomnia:  0  Restlessness:  0  Agitation:  0         Performance Status:  30    Living Arrangements:  Lives with spouse and Lives in apartment     Time-Based Charting:  No      Advance Care Planning   Advance Directives:   Do Not Resuscitate Status: Yes      Decision Making:  Patient answered questions          PAINAD: N/A    Caregiver burden formerly assessed: yes    Flaquita Mcnulty RN  Palliative Medicine  [facility] - Observation Unit

## 2022-08-24 NOTE — PROGRESS NOTES
[facility] - 3rd Floor Medical Telemetry  Pulmonary Critical Care Note    Patient Name: Harpreet Rodas  MRN: [id number]  Admission Date: 8/13/2022  Hospital Length of Stay: 11 days  Code Status: Full Code  Attending Provider: Ana Rosa Fry MD  Primary Care Provider: Primary Doctor No     Subjective:     HPI:   This is a 56-year-old male who was admitted to [facility] on 08/13/2022 for complaints of worsening shortness of breath which has been ongoing over the last several months left-sided chest pain, and significant swelling of his right arm and bilateral feet.  Patient has history of drinking and smoking quit approximately 2 months ago prior to that was drinking 4-5 40 oz beer a weekly.  Workup revealed significantly elevated BNP of 3691.5, troponin 0.134, and CT imaging of the chest with a left lower lobe pulmonary thrombus, large right pleural effusion, and emphysematous changes throughout the lungs.  Pulmonary is being consulted for consideration of thoracentesis.  Patient was initiated on Lovenox b.i.d. secondary to pulmonary emboli. TTE obtained. Further conversation revealed patient had been experiencing difficulty breathing approximately 6 months ago after inhalation of a combination of (murratic acid, sulfuric acid, bleach) while at work as a . Has been utilizing albuterol inhaler x2-3/day. Tried Trilegy x14 days without much relief. Approximately 3 months ago he began experiencing swelling in his B/L lower extremities in addition to early satiety.  He denied any associated weight loss stating he has always been a very thin person or fever/night sweats.  Denies any prior blood clots in legs/arm/lungs or any known cancers.  Mother and brother both passed away from unspecified cancer.      Patient has been exposed to a variety of pulmonary irritants/carcinogenic agents as listed below;  - Tobacco Use (approximately x70 pack years; quit x3 months ago)  - Asbestos (Approximately  10-15 years)  - Sandblasting  - Glenn dust  - Harsh chemicals (Murratic Acid, Sulfuric Acid, Bleach; without appropriate respirator use)    Hospital Course/Significant events:  - Thoracentesis performed (22); patient tolerated well              -- 1030 cc straw colored fluid removed              -- Pending pleural fluid studies for further diagnostics              -- Post-procedural CXR (x2) w/ right-sided trapped lung  - Continue FD Lovenox in setting of left pulmonary embolism  - HIV/Hepatitis Panel/Syphillis (all resulted negative)  - B/L LE & RUE U/S NIVA (negative)  - TTE w/ EF of 15% w/ diastolic dysfunction    24 Hour Interval History:  Patient is decompensated this morning and in respiratory distress. Patient is only able to speak a few words at a time before becoming short of breath. Spoke with patient about CODE status and he does not want to be intubated. Patient was made a DNR. Palliative care was consulted, appreciate their recommendations. Patient is net positive 20cc over the past 24 hours.    Past Medical History:   Diagnosis Date    COPD (chronic obstructive pulmonary disease)        Past Surgical History:   Procedure Laterality Date    MANDIBLE FRACTURE SURGERY         Social History     Socioeconomic History    Marital status:    Tobacco Use    Smoking status: Former Smoker     Packs/day: 2.00     Quit date: 2022     Years since quittin.1    Smokeless tobacco: Never Used   Substance and Sexual Activity    Alcohol use: Not Currently     Comment: quit 2 months ago    Drug use: Never       Current Outpatient Medications   Medication Instructions    albuterol sulfate (INV ALBUTEROL) 90 mcg inhalation Inhalation, As needed (PRN), Take one puff by mouth as directed by Physician.       Current Inpatient Medications   aspirin  81 mg Oral Daily    atorvastatin  40 mg Oral QHS    famotidine  20 mg Oral BID    furosemide (LASIX) injection  20 mg Intravenous Q12H        Review of Systems   All other systems reviewed and are negative.        Objective:       Intake/Output Summary (Last 24 hours) at 8/24/2022 0726  Last data filed at 8/23/2022 1335  Gross per 24 hour   Intake 720 ml   Output 700 ml   Net 20 ml         Vital Signs (Most Recent):  Temp: 97.6 °F (36.4 °C) (08/24/22 0725)  Pulse: 104 (08/24/22 0725)  Resp: (!) 22 (08/24/22 0725)  BP: 118/76 (08/24/22 0725)  SpO2: (!) 94 % (08/24/22 0725)  Body mass index is 16.85 kg/m².  Weight: 54.8 kg (120 lb 13 oz) Vital Signs (24h Range):  Temp:  [97.5 °F (36.4 °C)-97.8 °F (36.6 °C)] 97.6 °F (36.4 °C)  Pulse:  [] 104  Resp:  [18-22] 22  SpO2:  [84 %-98 %] 94 %  BP: (110-137)/(56-85) 118/76     Physical Exam  Constitutional:       General: He is not in acute distress.     Appearance: He is ill-appearing. He is not toxic-appearing.      Comments: Hypersomnolence, cachetic gentleman   HENT:      Head: Normocephalic and atraumatic.      Mouth/Throat:      Mouth: Mucous membranes are moist.      Pharynx: Oropharynx is clear. No oropharyngeal exudate or posterior oropharyngeal erythema.   Eyes:      General: No scleral icterus.  Cardiovascular:      Rate and Rhythm: Normal rate and regular rhythm.      Heart sounds: Normal heart sounds.   Pulmonary:      Effort: Respiratory distress present.      Breath sounds: Decreased breath sounds, wheezing and rhonchi present.   Musculoskeletal:      Right lower leg: Edema present.      Left lower leg: Edema present.      Comments: 1+ edema to BLE; Right arm with significant edema-  improving.   Neurological:      General: No focal deficit present.      Mental Status: He is alert and oriented to person, place, and time.   Psychiatric:         Mood and Affect: Mood normal.         Behavior: Behavior normal.       Lines/Drains/Airways     Peripheral Intravenous Line  Duration                Peripheral IV - Single Lumen 08/14/22 2000 Left;Posterior Forearm 9 days                Significant  Labs:    Lab Results   Component Value Date    WBC 10.4 08/24/2022    HGB 7.9 (L) 08/24/2022    HCT 24.8 (L) 08/24/2022    MCV 98.8 (H) 08/24/2022     08/24/2022   BNP of 3691.5, troponin 0.134      BMP  Lab Results   Component Value Date     08/24/2022    K 3.8 08/24/2022    CO2 49 (HH) 08/24/2022    BUN 21.7 08/24/2022    CREATININE 0.61 (L) 08/24/2022    CALCIUM 9.4 08/24/2022   Magnesium 1.5, phosphorus 5.7    ABG  Recent Labs   Lab 08/23/22  1034   PH 7.40   PO2 93   PCO2 105*   HCO3 65.0*       Significant Imaging:  X-Ray Chest 1 View  Narrative: EXAMINATION:  XR CHEST 1 VIEW    CPT 32914    CLINICAL HISTORY:  resp distress; Pneumonia, unspecified organism    COMPARISON:  August 21, 2022    FINDINGS:  Examination reveals mediastinal silhouette to be within normal limits cardiac silhouette is unchanged as compared with the previous exam.  There is blunting of the right costophrenic angle indicating the presence of a right-sided pleural effusion with compressive atelectatic changes small right apical pneumothorax is again identified.    On the left consolidative confluent airspace opacities are identified at the left base including increased left retrocardiac density and silhouetting of the left hemidiaphragm no other focal consolidative changes.    Left pneumothorax is not clearly seen  Impression: No significant change as compared with the previous exam with persistent right apical pneumothorax.    Left pneumothorax is not clearly seen    Electronically signed by: Jefferson Blackman  Date:    08/23/2022  Time:    11:00      Assessment/Plan:     Assessment  1. Left Lower Lobe Pulmonary Embolism  2. Large Right-sided pleural effusion w/ Atelectasis - improved s/p thoracentesis (8/17/22)  3. HFrEF (EF of 15%, 8/2022) w/ elevated BNP  4. Compensated Respiratory Acidosis w/ contraction alkalosis  5. RUE Edema  6. Hx of COPD (unquantified)  7. History of tobacco and alcohol abuse  8. Hx of Multiple  Pulmonary Irritants as listed in above HPI  9. Cachectic w/ x3 months early satiety  10. Hypercapnia - Compensated     Plan  - Pleural fluid study results from thoracentesis on 8/17/22 shows transudate effusion  - Continue FD Lovenox in setting of left pulmonary embolism, monitor for further episodes of hematemesis  - Sputum culture shows moderate gram positive cocci, moderate gram negative rods, and few gram negative diplococci, likely normal patricia   - CT abd/pelvis negative for signs of malignancy    - Continue diuresis as tolerated; currently Lasix 20mg Q12h; recommendations per cardiology  - Volume status is +20cc last 24 hours and 14.7 liters total  -Avoid over oxygenating patient is at risk of killing resp drive  - Discontinued ativan and sedating medications   - Spoke with patient about CODE Status and he would like to be made a DNR  -Consulted palliative care, appreciate their assistance   - Patient's prognosis remains guarded     Harpreet Mancuso, DO   Pulmonary Critical Care Medicine

## 2022-08-24 NOTE — PROGRESS NOTES
"[facility]  HOSPITAL MEDICINE PROGRESS NOTE      Patient Name: Harpreet Rodas  MRN: [id number]  Admission Date: 8/13/2022 12:32 PM  Status: IP- Inpatient   Length of Stay: 11 days  Face-to-Face encounter date: 08/24/2022       CHIEF COMPLAINT  Follow-up on respiratory failure and congestive heart failure      HOSPITAL COURSE  Patient is a 56-year-old white male with a past medical history of chronic obstructive pulmonary disease. The patient presented to [facility] on 8/13/2022 with a primary complaint of dyspnea on exertion, edema and chest pain.      Patient reports that for about 6 months he has had worsening dyspnea on exertion. Currently, just walking in his living room make him extremely short of breath. He started having swelling of his right arm and both legs 3 days ago. He started having diarrhea at the same time. He has lost weight. He says he gets hungry but feels full after a few bites and the food goes right through him. He came to the ER he began having left side chest pain, "a sticking sensation". He denies associated symptoms and it only lasts a short time. No radiation. No known history of CAD. Denies fever, chills or sweats. He quit smoking and drinking about 2 months ago after his wife had pneumonia and his breathing was worsening. He was drinking 4-5 40oz beers a week prior to quitting. The pt became very short of breath when trying to get into wheelchair for chest CT. However, he was able to tolerate CT and lay flat when transported by bed. He was given Lasix in the ED. He says he is feeling better. He is not on home oxygen. Only home med is an inhaler. Echocardiogram showed LVEF 15%, grade III left ventricular diastolic dysfunction, pulmonary hypertension, mild tricuspid regurgitation, moderate right atrial enlargement.    Patient was sitting up in bed watching television when I initially evaluated him this morning.  Nursing staff reported that he has intermittent anxiety " attacks attributed to dyspnea.  He was afebrile, hemodynamically stable and on supplemental oxygen at 2 liters/minute via nasal cannula.    At 2:15 p.m. on 08/21/2022, I received notification that the patient was having respiratory distress with a heart rate of 120, respiratory rate of 31 breaths per minute, a blood pressure of 130/73 and an oxygen saturation of 100% on supplemental oxygen at 10 liters/minute via OxyMask.  I ordered stat arterial blood gases and a chest x-ray.  Chest x-ray was interpreted as similar to previous.  Arterial blood gases appear somewhat similar to those obtained on 08/16/2022 with the exception that pO2 has improved and pCO2 has slightly increased however it is compensated as pH is only 7.36.    Interval history:  Patient sitting up in bed and is incredibly dyspneic.  The patient can barely speak due to such severe dyspnea.  He is requiring supplemental oxygen at 5 liters/minute via OxyMask.  He is hemodynamically stable.  Pulmonology made the patient DNR earlier today which I absolutely 100% completely agree with.    OBJECTIVE    VITAL SIGNS: 24 HRS MIN & MAX LAST   Temp  Min: 97.5 °F (36.4 °C)  Max: 98.7 °F (37.1 °C) 98.7 °F (37.1 °C)   BP  Min: 110/69  Max: 121/74 121/74   Pulse  Min: 75  Max: 111  (!) 111   Resp  Min: 18  Max: 28 (!) 22   SpO2  Min: 90 %  Max: 98 % 98 %       LABS/MICROBIOLOGY/MEDICATIONS/DIAGNOSTICS  I have reviewed all pertinent lab results within the past 24 hours.    Recent Labs   Lab 08/22/22  0644 08/23/22  0715 08/24/22  0416   WBC 10.6 10.9 10.4   RBC 2.91* 2.63* 2.51*   HGB 8.8* 7.9* 7.9*   HCT 28.5* 26.6* 24.8*   MCV 97.9* 101.1* 98.8*   MCH 30.2 30.0 31.5*   MCHC 30.9* 29.7* 31.9*   RDW 13.2 13.3 13.2    191 226   MPV 10.8* 10.3 9.6       Recent Labs   Lab 08/18/22  0419 08/19/22  0647 08/20/22  0905 08/21/22  1505 08/22/22  0644 08/23/22  0715 08/23/22  1034 08/24/22  0416    139   < >  --  137 137  --  140   K 3.8 3.6   < >  --  4.2 3.8  --   3.8   CO2 39* 43*   < >  --  46* 46*  --  49*   BUN 16.0 14.2   < >  --  15.6 16.2  --  21.7   CREATININE 0.69* 0.64*   < >  --  0.57* 0.56*  --  0.61*   CALCIUM 8.0* 8.3*   < >  --  9.0 9.4  --  9.4   PH  --   --   --  7.36  --   --  7.40  --    MG 2.00  --   --   --   --   --   --   --    ALBUMIN  --  2.8*  --   --   --  2.8*  --   --    ALKPHOS  --  93  --   --   --  101  --   --    ALT  --  33  --   --   --  34  --   --    AST  --  45*  --   --   --  43*  --   --    BILITOT  --  1.0  --   --   --  0.5  --   --     < > = values in this interval not displayed.       Recent Labs     08/23/22  0715 08/24/22  0416   WBC 10.9 10.4   RBC 2.63* 2.51*   HGB 7.9* 7.9*   HCT 26.6* 24.8*   .1* 98.8*   MCH 30.0 31.5*   MCHC 29.7* 31.9*   RDW 13.3 13.2     Recent Labs     08/23/22  0715 08/24/22  0416   CHLORIDE 79* 78*   CO2 46* 49*   BUN 16.2 21.7   CREATININE 0.56* 0.61*   GLUCOSE 95 145*   CALCIUM 9.4 9.4   ALBUMIN 2.8*  --    ALKPHOS 101  --    ALT 34  --    AST 43*  --        Microbiology Results (last 7 days)     Procedure Component Value Units Date/Time    Respiratory Culture [[id number]]  (Abnormal) Collected: 08/22/22 1705    Order Status: Completed Specimen: Sputum Updated: 08/24/22 0953     Respiratory Culture Normal respiratory patricia     GRAM STAIN Quality 3+      Moderate Gram positive cocci      Moderate Gram Negative Rods      Few Gram negative diplococci    Stool Culture **CANNOT BE ORDERED STAT** [[id number]]  (Normal) Collected: 08/23/22 0500    Order Status: Completed Specimen: Stool Updated: 08/24/22 0830     Stool Culture Negative for Salmonella, Shigella, Campylobacter, Vibrio, Aeromonas, Pleisiomonas,Yersinia, or Shiga Toxin 1 and 2.    Body Fluid Culture [[id number]] Collected: 08/17/22 1052    Order Status: Completed Specimen: Body Fluid from Thoracentesis Fluid Updated: 08/22/22 0901     Body Fluid Culture Final Report: At 5 days. No growth    Blood Culture [[id number]]  (Normal) Collected:  08/13/22 1314    Order Status: Completed Specimen: Blood from Arm, Left Updated: 08/18/22 2102     CULTURE, BLOOD ([facility]) No Growth at 5 days    Blood Culture [[id number]]  (Normal) Collected: 08/13/22 1314    Order Status: Completed Specimen: Blood from Hand, Left Updated: 08/18/22 2102     CULTURE, BLOOD ([facility]) No Growth at 5 days    Gram Stain [[id number]] Collected: 08/17/22 1052    Order Status: Completed Specimen: Body Fluid from Pleural Fluid Updated: 08/18/22 0745     GRAM STAIN No WBCs, No bacteria seen            X-Ray Chest 1 View  Narrative: EXAMINATION:  XR CHEST 1 VIEW    CLINICAL HISTORY:  shortness of breath, hx of effusions;  Heart failure, unspecified    TECHNIQUE:  Single view of the chest    COMPARISON:  08/23/2022    FINDINGS:  Moderate right pleural effusion is similar to prior.  Cardiomegaly is unchanged.  Impression: Moderate right pleural effusion is similar to prior. Cardiomegaly is unchanged.    Electronically signed by: Basilio De Oliveira  Date:    08/24/2022  Time:    11:49        Scheduled Med:   aspirin  81 mg Oral Daily    atorvastatin  40 mg Oral QHS    famotidine  20 mg Oral BID    furosemide (LASIX) injection  40 mg Intravenous Q12H        Continuous Infusions:  None.    PRN Meds:  acetaminophen, albuterol-ipratropium, melatonin, promethazine, sodium chloride 0.9%, sodium chloride 0.9%, trazodone       PHYSICAL EXAM  General:  Cachectic chronically ill-appearing white male who appears much older than his stated age, in no respiratory distress with generalized edema  HENT: normocephalic, atraumatic, poor dentition  Eye: PERRL, EOMI, clear conjunctiva  Neck: full ROM, no thyromegaly  Respiratory: diminished breath sounds with bibasilar crackles  Cardiovascular: tachycardic with a regular rhythm, bilateral upper and lower extremity edema  Gastrointestinal: non-distended, positive bowel sounds, non-tender  Genitourinary: scrotal edema  Musculoskeletal: extensive generalized muscular  atrophy  Integumentary: warm, dry, intact, no rashes  Neurological: cranial nerves grossly intact, no focal neurological deficit  Psychiatric: cooperative, anxious      ASSESSMENT  Acute hypoxic respiratory failure  Acute combined systolic and diastolic congestive heart failure left ventricular ejection fraction of 15% and grade III left ventricular diastolic dysfunction  Dilated cardiomyopathy  Left lower lobe acute pulmonary thromboembolism  Large right pleural effusion status post right thoracentesis 8/17/22  Persistent bilateral hydropneumothorax  Anasarca and ascites  Pulmonary hypertension  Probable hepatic cirrhosis with long history of alcohol abuse  COPD with emphysema with long history of tobacco abuse  Macrocytic anemia, slowly worsening  Type 2 NSTEMI  Cachexia  Compensated respiratory acidosis with severe contraction metabolic alkalosis  Hemoptysis      PLAN  CIS discontinued Lovenox due to hemoptysis  CIS continuing diuresis with intravenous Lasix 40 mg every 12 hours   I discontinued Zaroxolyn due to severe metabolic alkalosis  Continue supplemental oxygen to keep SaO2 greater than 90%  Blood cultures and urine culture are thus far negative  Continue aspirin and statin  CIS to start GDMT when euvolemic which is unlikely to happen any time soon if ever  Will need Lifevest prior to discharge if he ever goes home  Will need ischemic work up inpatient when euvolemic which is unlikely to happen any time soon if ever  Bilateral upper and lower extremity Doppler ultrasounds were negative for deep or superficial vein thrombosis   HIV, Hepatitis panel, syphilis were all negative  Pulmonology ordered a CT of the abdomen and pelvis with contrast as part of a malignancy evaluation which was unrevealing  Patient has an extremely poor prognosis.  Requested an evaluation from palliative medicine.  Case was discussed with them as well as the patient's  today and the patient has agreed to an informational  visit from hospice.    DVT Prophylaxis:  Full-dose Lovenox has been discontinued due to hemoptysis    Critical Care Diagnosis: Acute combined systolic congestive heart failure requiring IV diuretics; acute hypoxic respiratory failure requiring supplemental oxygen  Critical Care Interventions: Hands-on evaluation, review of labs, radiographs, medical records, and discussion with the patient and medical staff in order to assess and manage the high probability of imminent or life-threatening deterioration of cardio-respiratory status requiring vasopressor support and/or intubation and mechanical ventilation.  Critical Care Time Spent: 35 minutes      Patient condition:  Guarded with very poor long-term prognosis    Anticipated discharge and disposition:  Patient would best benefit from transfer to inpatient hospice as soon as possible  __________________________________________________________________________    All diagnosis and differential diagnosis have been reviewed; assessment and plan have been documented. I have personally reviewed the test results that are presently available; I have reviewed the patient's medication list. I have reviewed the consulting providers' recommendations. I have reviewed or attempted to review medical records based upon their availability.  All of the patient's questions have been addressed and answered. Patient is agreeable to the above stated plan. I will continue to monitor closely and make adjustment to medical management as needed.    This document was created with the assistance of a voice recognition software. There may be transcription errors as a result of using this technology, however minimal. Effort has been made to ensure accuracy of transcription but any obvious errors or omissions should be clarified with the author of this document.        Jose Manuel Natarajan MD   [facility]  08/24/2022

## 2022-08-24 NOTE — PLAN OF CARE
I met with pt to discuss dc poc . Pt tells me he want to get better. He plans to go home. We discussed Pall /Hospice care and I offered an informational visit so pt is knowledgeable of the services they can provide. Pt is receptive and will call his wife .

## 2022-08-24 NOTE — PLAN OF CARE
I received report this am and informed palliative care met with pt and he is resistant to both palliative and hospice care. Nurse states he refuses DNR status and states he plans to discharge home by Monday.

## 2022-08-24 NOTE — PROGRESS NOTES
[facility] - 3rd Floor Medical Telemetry  Cardiology  Progress Note    Patient Name: Harpreet Rodas  MRN: [id number]  Admission Date: 8/13/2022  Hospital Length of Stay: 11 days  Code Status: Full Code   Attending Provider: Ana Rosa Fry MD   Consulting Provider: ART De Oliveira  Primary Care Physician: Primary Doctor No  Principal Problem:Anasarca    Patient information was obtained from patient and ER records.     Subjective:     Chief Complaint:       HPI:   Mr. Mayen is a 55y/o male, unknown to [facility], with PMHx significant for COPD and previous heavy smoker/drinker who presented to [facility] via EMS for c/o SOB, MON, orthopnea, peripheral edema, diarrhea, and chest pain described as sticking sensation. No radiation or associated symptoms.  RA sat upon EMS arrival-88% and he was placed on Bipap. In ED, workup significant for BUN/creatinine 45/1.36, K 6.1, D-dimer 3.05, BNP 3280, Troponin 0.134-0.120-0.105. CXR revealing bilateral pleural effusions. Right> left with possible RLL opacification. CTA chest obtained LLL PE, large right pleural effusion, and findings of anasarca. EF revealing EF 15%, Grade III LVDD and RA/RV enlargement. He was placed on diuretics with consult placed to [facility] for PVC and 2nd degree Type II AVB. Review of tele reveals SR with non-conducted PACs. No 2nd degree heart block noted.     Hospital Course:  8/16/22: Patient awake in bed. Good diuresis with Lasix. Still volume overloaded.   8/17/22: Patient awake in bed. Continues to require O2 support. S/p right sided thoracentesis with reported removal of 1030 L straw colored fluid. CO2 rising. Patient started on diamox. Renal indices stable. -5.5L UOP x 24hr  8/18/22: Patient sitting up in bedside chair. Reports SOB and peripheral edema improving.   8/19/22: Patient awake in bed. NAD. Diminished breath sounds.   8/20/22: Patient awake in bed. NAD. Still orthopneic. Diminished breath sounds to Right lung fields. Edema continues  to recede.   8/21/22: Patient awake in bed. +SOB/orthopneic. Good diuresis with lasix.   8/22/22: Patient awake in bed. Continues to require O2 support and remains orthopneic. Breath sounds to right lung fields very diminished. Patient is coughing up large blood clots. Currently on weight based Lovenox BID.   8/23/22: Patient is somnolent this morning. He did receive a dose of Ativan overnight. ABGs reveal a compensated respiratory acidosis. PCO2  105. Pleural fluid from thoracentesis is transudative. He is still having hemoptysis of large dark red blood clots. Anemia worsening. Lovenox has been placed on hold due to hemoptysis and worsening anemia.   8.24.22; Patient awake in bed. Breathing is labored. O2 sat 66%. Discussed with Dr. Mancuso at bedside- patient does not wish to be intubated. Will meet with Dr. Daigle later today to discuss options such as comfort care. O2 increased per oxymask until O2 sat recovered and then weaned down for O2 sat 88%. Patient has no reserve and decompensates with minimal activity/speaking      PMH: COPD  PSH: mandible fx surgery,  Family History: Mother- cancer; brother- cancer  Social History: previous heavy smoker (2ppd)/drinker- quit 6/22    Previous Cardiac Diagnostics:   Venous US BUE 08/14/22:  No evidence of deep or superficial vein thrombosis in bilateral upper extremities.     Venous US BLE 08/14/22:  Negative DVT to BLE    TTE 08/13/22:  Severely decreased Systolic function. Estimated EF 15%. Grade III LVDD. There is pulmonary HTN, Mild TR. Moderate RV enlargement. Moderate RA enlargement. Intermediate CVP 8 mmHg. Estimated PASP 50mmHg    Review of Systems   Respiratory: Positive for cough and shortness of breath.    Neurological: Positive for weakness.   Psychiatric/Behavioral: The patient is nervous/anxious.        Objective:     Vital Signs (Most Recent):  Temp: 97.5 °F (36.4 °C) (08/24/22 0322)  Pulse: 105 (08/24/22 0400)  Resp: 18 (08/24/22 0322)  BP: 112/68  (08/24/22 0322)  SpO2: 95 % (08/24/22 0322) Vital Signs (24h Range):  Temp:  [97.5 °F (36.4 °C)-98.9 °F (37.2 °C)] 97.5 °F (36.4 °C)  Pulse:  [] 105  Resp:  [18-22] 18  SpO2:  [84 %-100 %] 95 %  BP: (101-137)/(35-85) 112/68     Weight: 54.8 kg (120 lb 13 oz)  Body mass index is 16.85 kg/m².    SpO2: 95 %  O2 Device (Oxygen Therapy): nasal cannula w/ humidification      Intake/Output Summary (Last 24 hours) at 8/24/2022 0608  Last data filed at 8/23/2022 1335  Gross per 24 hour   Intake 720 ml   Output 700 ml   Net 20 ml       Lines/Drains/Airways     Peripheral Intravenous Line  Duration                Peripheral IV - Single Lumen 08/14/22 2000 Left;Posterior Forearm 9 days                Significant Labs:  Recent Results (from the past 72 hour(s))   Basic Metabolic Panel    Collection Time: 08/21/22 10:00 AM   Result Value Ref Range    Sodium Level 136 136 - 145 mmol/L    Potassium Level 3.8 3.5 - 5.1 mmol/L    Chloride 87 (L) 98 - 107 mmol/L    Carbon Dioxide 44 (HH) 22 - 29 mmol/L    Glucose Level 180 (H) 74 - 100 mg/dL    Blood Urea Nitrogen 12.8 8.4 - 25.7 mg/dL    Creatinine 0.58 (L) 0.73 - 1.18 mg/dL    BUN/Creatinine Ratio 22     Calcium Level Total 8.3 (L) 8.4 - 10.2 mg/dL    Anion Gap 5.0 mEq/L    eGFR >60 mls/min/1.73/m2   CBC with Differential    Collection Time: 08/21/22 10:00 AM   Result Value Ref Range    WBC 8.6 4.5 - 11.5 x10(3)/mcL    RBC 2.88 (L) 4.70 - 6.10 x10(6)/mcL    Hgb 8.8 (L) 14.0 - 18.0 gm/dL    Hct 28.9 (L) 42.0 - 52.0 %    .3 (H) 80.0 - 94.0 fL    MCH 30.6 27.0 - 31.0 pg    MCHC 30.4 (L) 33.0 - 36.0 mg/dL    RDW 13.2 11.5 - 17.0 %    Platelet 159 130 - 400 x10(3)/mcL    MPV 9.7 7.4 - 10.4 fL    Neut % 78.0 %    Lymph % 12.0 %    Mono % 8.4 %    Eos % 0.9 %    Basophil % 0.2 %    Lymph # 1.03 0.6 - 4.6 x10(3)/mcL    Neut # 6.7 2.1 - 9.2 x10(3)/mcL    Mono # 0.72 0.1 - 1.3 x10(3)/mcL    Eos # 0.08 0 - 0.9 x10(3)/mcL    Baso # 0.02 0 - 0.2 x10(3)/mcL    IG# 0.04 0 - 0.04  x10(3)/mcL    IG% 0.5 %    NRBC% 0.0 %   POCT ARTERIAL BLOOD GAS    Collection Time: 08/21/22  3:05 PM   Result Value Ref Range    POC PH 7.36 7.35 - 7.45    POC PCO2 94 (AA) 19 - 50 mmHg    POC PO2 98 80 - 100 mmHg    POC HEMOGLOBIN 8.5 (A) 12 - 16 g/dL    POC SATURATED O2 97.4 %    POC O2Hb 95.7 94.0 - 97.0 %    POC COHb 2.1 %    POC MetHb 1.0 0.40 - 1.5 %    POC Potassium 4.0 3.5 - 5.0 mmol/l    POC Sodium 132 (A) 137 - 145 mmol/l    POC Ionized Calcium 1.10 (A) 1.12 - 1.23 mmol/l    Correct Temperature (PH) 7.36 7.35 - 7.45    Corrected Temperature (pCO2) 94 (AA) 19 - 50 mmHg    Corrected Temperature (pO2) 98 80 - 100 mmHg    POC HCO3 53.1 (A) 22.0 - 26.0 mmol/l    Base Deficit 24.2 (A) -2.00 - 2.00 mmol/l    POC Temp 37.0 °C    Specimen source Arterial sample    Basic Metabolic Panel    Collection Time: 08/22/22  6:44 AM   Result Value Ref Range    Sodium Level 137 136 - 145 mmol/L    Potassium Level 4.2 3.5 - 5.1 mmol/L    Chloride 85 (L) 98 - 107 mmol/L    Carbon Dioxide 46 (HH) 22 - 29 mmol/L    Glucose Level 97 74 - 100 mg/dL    Blood Urea Nitrogen 15.6 8.4 - 25.7 mg/dL    Creatinine 0.57 (L) 0.73 - 1.18 mg/dL    BUN/Creatinine Ratio 27     Calcium Level Total 9.0 8.4 - 10.2 mg/dL    Anion Gap 6.0 mEq/L    eGFR >60 mls/min/1.73/m2   CBC with Differential    Collection Time: 08/22/22  6:44 AM   Result Value Ref Range    WBC 10.6 4.5 - 11.5 x10(3)/mcL    RBC 2.91 (L) 4.70 - 6.10 x10(6)/mcL    Hgb 8.8 (L) 14.0 - 18.0 gm/dL    Hct 28.5 (L) 42.0 - 52.0 %    MCV 97.9 (H) 80.0 - 94.0 fL    MCH 30.2 27.0 - 31.0 pg    MCHC 30.9 (L) 33.0 - 36.0 mg/dL    RDW 13.2 11.5 - 17.0 %    Platelet 142 130 - 400 x10(3)/mcL    MPV 10.8 (H) 7.4 - 10.4 fL    Neut % 74.8 %    Lymph % 13.8 %    Mono % 9.8 %    Eos % 0.7 %    Basophil % 0.3 %    Lymph # 1.46 0.6 - 4.6 x10(3)/mcL    Neut # 7.9 2.1 - 9.2 x10(3)/mcL    Mono # 1.03 0.1 - 1.3 x10(3)/mcL    Eos # 0.07 0 - 0.9 x10(3)/mcL    Baso # 0.03 0 - 0.2 x10(3)/mcL    IG# 0.06 (H) 0 -  0.04 x10(3)/mcL    IG% 0.6 %    NRBC% 0.0 %   Respiratory Culture    Collection Time: 08/22/22  5:05 PM    Specimen: Sputum   Result Value Ref Range    Respiratory Culture No Growth At 24 Hours     GRAM STAIN Quality 3+ (A)     GRAM STAIN Moderate Gram positive cocci (A)     GRAM STAIN Moderate Gram Negative Rods (A)     GRAM STAIN Few Gram negative diplococci (A)    Comprehensive Metabolic Panel    Collection Time: 08/23/22  7:15 AM   Result Value Ref Range    Sodium Level 137 136 - 145 mmol/L    Potassium Level 3.8 3.5 - 5.1 mmol/L    Chloride 79 (L) 98 - 107 mmol/L    Carbon Dioxide 46 (HH) 22 - 29 mmol/L    Glucose Level 95 74 - 100 mg/dL    Blood Urea Nitrogen 16.2 8.4 - 25.7 mg/dL    Creatinine 0.56 (L) 0.73 - 1.18 mg/dL    Calcium Level Total 9.4 8.4 - 10.2 mg/dL    Protein Total 6.6 6.4 - 8.3 gm/dL    Albumin Level 2.8 (L) 3.5 - 5.0 gm/dL    Globulin 3.8 (H) 2.4 - 3.5 gm/dL    Albumin/Globulin Ratio 0.7 (L) 1.1 - 2.0 ratio    Bilirubin Total 0.5 <=1.5 mg/dL    Alkaline Phosphatase 101 40 - 150 unit/L    Alanine Aminotransferase 34 0 - 55 unit/L    Aspartate Aminotransferase 43 (H) 5 - 34 unit/L    eGFR >60 mls/min/1.73/m2   CBC with Differential    Collection Time: 08/23/22  7:15 AM   Result Value Ref Range    WBC 10.9 4.5 - 11.5 x10(3)/mcL    RBC 2.63 (L) 4.70 - 6.10 x10(6)/mcL    Hgb 7.9 (L) 14.0 - 18.0 gm/dL    Hct 26.6 (L) 42.0 - 52.0 %    .1 (H) 80.0 - 94.0 fL    MCH 30.0 27.0 - 31.0 pg    MCHC 29.7 (L) 33.0 - 36.0 mg/dL    RDW 13.3 11.5 - 17.0 %    Platelet 191 130 - 400 x10(3)/mcL    MPV 10.3 7.4 - 10.4 fL    Neut % 78.7 %    Lymph % 10.2 %    Mono % 9.8 %    Eos % 0.6 %    Basophil % 0.2 %    Lymph # 1.11 0.6 - 4.6 x10(3)/mcL    Neut # 8.6 2.1 - 9.2 x10(3)/mcL    Mono # 1.06 0.1 - 1.3 x10(3)/mcL    Eos # 0.06 0 - 0.9 x10(3)/mcL    Baso # 0.02 0 - 0.2 x10(3)/mcL    IG# 0.05 (H) 0 - 0.04 x10(3)/mcL    IG% 0.5 %    NRBC% 0.0 %   POCT ARTERIAL BLOOD GAS    Collection Time: 08/23/22 10:34 AM    Result Value Ref Range    POC PH 7.40 7.35 - 7.45    POC PCO2 105 (AA) 19.0 - 50.0 mmHg    POC PO2 93 80 - 100 mmHg    POC HEMOGLOBIN 7.3 (A) 12 - 16 g/dL    POC SATURATED O2 97.2 %    POC O2Hb 96.9 94.0 - 97.0 %    POC COHb 2.2 %    POC MetHb 0.30 (A) 0.40 - 1.5 %    POC Potassium 3.6 3.5 - 5.0 mmol/l    POC Sodium 134 (A) 137 - 145 mmol/l    POC Ionized Calcium 1.14 1.12 - 1.23 mmol/l    Correct Temperature (PH) 7.40 7.35 - 7.45    Corrected Temperature (pCO2) 105 (AA) 19.0 - 50.0 mmHg    Corrected Temperature (pO2) 93 80 - 100 mmHg    POC HCO3 65.0 (A) 22.0 - 26.0 mmol/l    Base Deficit 36.1 (A) -2.00 - 2.00 mmol/l    POC Temp 37.0 °C    Specimen source Arterial sample    Basic Metabolic Panel    Collection Time: 08/24/22  4:16 AM   Result Value Ref Range    Sodium Level 140 136 - 145 mmol/L    Potassium Level 3.8 3.5 - 5.1 mmol/L    Chloride 78 (L) 98 - 107 mmol/L    Carbon Dioxide 49 (HH) 22 - 29 mmol/L    Glucose Level 145 (H) 74 - 100 mg/dL    Blood Urea Nitrogen 21.7 8.4 - 25.7 mg/dL    Creatinine 0.61 (L) 0.73 - 1.18 mg/dL    BUN/Creatinine Ratio 36     Calcium Level Total 9.4 8.4 - 10.2 mg/dL    Anion Gap 13.0 mEq/L    eGFR >60 mls/min/1.73/m2   CBC with Differential    Collection Time: 08/24/22  4:16 AM   Result Value Ref Range    WBC 10.4 4.5 - 11.5 x10(3)/mcL    RBC 2.51 (L) 4.70 - 6.10 x10(6)/mcL    Hgb 7.9 (L) 14.0 - 18.0 gm/dL    Hct 24.8 (L) 42.0 - 52.0 %    MCV 98.8 (H) 80.0 - 94.0 fL    MCH 31.5 (H) 27.0 - 31.0 pg    MCHC 31.9 (L) 33.0 - 36.0 mg/dL    RDW 13.2 11.5 - 17.0 %    Platelet 226 130 - 400 x10(3)/mcL    MPV 9.6 7.4 - 10.4 fL    Neut % 80.2 %    Lymph % 9.5 %    Mono % 8.6 %    Eos % 0.5 %    Basophil % 0.4 %    Lymph # 0.99 0.6 - 4.6 x10(3)/mcL    Neut # 8.4 2.1 - 9.2 x10(3)/mcL    Mono # 0.90 0.1 - 1.3 x10(3)/mcL    Eos # 0.05 0 - 0.9 x10(3)/mcL    Baso # 0.04 0 - 0.2 x10(3)/mcL    IG# 0.08 (H) 0 - 0.04 x10(3)/mcL    IG% 0.8 %    NRBC% 0.0 %       Significant Imaging:  Imaging Results           CTA Chest Abdomen Non Coronary (Final result)  Result time 08/13/22 15:55:52   Procedure changed from CTA Chest Non-Coronary (PE Study)     Final result by Basilio De Oliveira MD (08/13/22 15:55:52)                 Impression:      Left lower lobe pulmonary thromboembolism is noted.    Large right pleural effusion is present.    Findings of anasarca.    The bladder wall is prominent.  Correlate with urinalysis.    There are cortical defects of the bilateral kidneys likely related to chronic renal disease and scarring, however infarcts are less likely.    The liver appears heterogeneous which may be related to phase of contrast however is cirrhosis is not excluded.  Cardiomegaly is present.  Further evaluation may be obtained with ECHO.    Findings reported to Dr. Little prior to interpretation.      Electronically signed by: Basilio De Oliveira  Date:    08/13/2022  Time:    15:55             Narrative:    EXAMINATION:  CTA CHEST ABDOMEN NON CORONARY (XPD)    CLINICAL HISTORY:  Pulmonary embolism (PE) suspected, positive D-dimer;    TECHNIQUE:  Axial CTA images of the chest, abdomen, and pelvis were obtained With Contrast. Sagittal and coronal reconstructed images were available for review.    Automatic exposure control was utilized to reduce the patient's radiation dose.    DLP = 582    COMPARISON:  No prior images available for comparison.    FINDINGS:  PULMONARY ARTERY: Thrombus is identified in the left lower lobe pulmonary artery.    AORTA: The thoracoabdominal aorta is normal in course and caliber. Scattered atherosclerotic disease is noted.    HEART: The heart is enlarged.  No pericardial effusion.    THYROID GLAND: The thyroid is not enlarged. There are no nodules identified.    AIRWAYS: Trachea is midline and tracheobronchial tree is patent.    LUNGS: Large right effusion with subsegmental atelectatic changes at the right base.  Emphysematous changes throughout the lungs.    THROACIC LYMPH NODES: There is  no significant mediastinal, axillary or hilar lymphadenopathy.    HEPATOBILIARY: Somewhat nodular contour of the superior aspect of the liver with some heterogeneity may be related to phase of contrast versus cirrhosis.  Correlate with patient's history.  The gallbladder is normal.    SPLEEN: Normal    PANCREAS: No focal masses or ductal dilatation.    ADRENALS: No adrenal nodules.    KIDNEYS: No evidence of hydronephrosis.  Bilateral renal cortical perfusional defects likely related to scarring in chronic kidney disease.  Correlate with patient's history.  Less likely infarcts.    ABDOMINAL LYMPHADENOPATHY/RETROPERITONEUM: There is no retroperitoneal lymphadenopathy.    BOWEL: No acute bowel related abnormalities.    PELVIC VISCERA: The bladder wall is somewhat prominent.  Correlate with urinalysis.    PELVIC LYMPH NODES: No lymphadenopathy.    PERITONEUM/ BODY WALL: Diffuse body wall edema with moderate ascites noted.    SKELETAL: No aggressive appearing lytic/blastic lesion. No acute fractures, subluxations or dislocations.                               X-Ray Chest 1 View (Final result)  Result time 08/13/22 12:44:56    Final result by Basilio De Oliveira MD (08/13/22 12:44:56)                 Impression:      Right greater than left pleural effusion with possible right lower lobe opacification.  Underlying infectious process is not excluded.  Recommend continued follow-up.      Electronically signed by: Basilio De Oliveira  Date:    08/13/2022  Time:    12:44             Narrative:    EXAMINATION:  XR CHEST 1 VIEW    CLINICAL HISTORY:  shortness of breath;    TECHNIQUE:  Single view of the chest    COMPARISON:  No prior imaging available for comparison.    FINDINGS:  Right greater than left pleural effusion with possible right lower lobe opacification.  Underlying infectious process is not excluded.  Recommend continued follow-up.                              Telemetry:          Physical Exam  Constitutional:        Appearance: He is ill-appearing.   HENT:      Mouth/Throat:      Mouth: Mucous membranes are dry.      Comments: Poor dentition  Cardiovascular:      Rate and Rhythm: Regular rhythm. Tachycardia present.      Heart sounds: Normal heart sounds.   Pulmonary:      Effort: Respiratory distress present.      Breath sounds: Rales present.      Comments: Diminished breath sounds to right lung fields    + hemoptysis  Abdominal:      Palpations: Abdomen is soft.   Musculoskeletal:      Right lower leg: Edema present.      Left lower leg: Edema present.   Skin:     General: Skin is warm.   Neurological:      Mental Status: He is alert.   Psychiatric:         Mood and Affect: Mood is anxious.         Current Inpatient Medications:    Current Facility-Administered Medications:     acetaminophen tablet 650 mg, 650 mg, Oral, Q4H PRN, Reginald Barker MD, 650 mg at 08/20/22 1311    albuterol-ipratropium 2.5 mg-0.5 mg/3 mL nebulizer solution 3 mL, 3 mL, Nebulization, Q4H PRN, Reginald Barker MD    aspirin chewable tablet 81 mg, 81 mg, Oral, Daily, ART De Oliveira, 81 mg at 08/23/22 0843    atorvastatin tablet 40 mg, 40 mg, Oral, QHS, Kwasi Thao MD, 40 mg at 08/23/22 2124    famotidine tablet 20 mg, 20 mg, Oral, BID, Reginald Barker MD, 20 mg at 08/23/22 2124    furosemide injection 20 mg, 20 mg, Intravenous, Q12H, Kwasi Thao MD, 20 mg at 08/24/22 0417    melatonin tablet 6 mg, 6 mg, Oral, Nightly PRN, Reginald Barker MD    promethazine tablet 25 mg, 25 mg, Oral, Q6H PRN, Reginald Barker MD    sodium chloride 0.9% flush 10 mL, 10 mL, Intravenous, PRN, Yuni Little MD    sodium chloride 0.9% flush 10 mL, 10 mL, Intravenous, PRN, Reginald Barker MD    traZODone tablet 50 mg, 50 mg, Oral, Nightly PRN, Alan Daigle MD, 50 mg at 08/23/22 2124         VTE Risk Mitigation (From admission, onward)         Ordered     IP VTE HIGH RISK PATIENT  Once         08/13/22 1523     Place sequential compression device   Until discontinued         08/13/22 1523     Place GIACOMO hose  Until discontinued         08/13/22 1523                Assessment:   CMO, unspecified  --EF 15%  Acute combined systolic and diastolic HF  --EF 15%; Grade III LVDD  --BNP 3280  --s/p right thoracentesis with 1030 fluid removal (transudative)  NSTEMI, Type II s/t heart failure  --Troponin 0.134->0.120->0.105  Left BBB  LLL PE  Hemoptysis  Anemia  --worsening   Metabolic alkalosis  --worsening  ANNETTA  --resolved  COPD  Former smoker  No hx GIB    Plan:    Still overloaded and decompensates with minimal activity. Edema increasing. Will increase Lasix to 40 mg IVP bid.   Ensure accurate I&O's/daily weights  Continue to hold Lovenox due to hemoptysis.   Patient has poor prognosis and condition is guarded. Plans to discuss wishes with Palliative Care today    Cardiology  [facility] - 3rd Floor Medical Telemetry  08/24/2022

## 2022-08-24 NOTE — PROGRESS NOTES
Inpatient Nutrition Assessment    Admit Date: 8/13/2022   Length of Stay: 11 days  Nutrition Recommendation/Prescription     1. Continue diet as ordered.   2. Continue Boost Glucose Control for additional nutrition (250kcals, 14g protein per serving)    Communication of Recommendations: reviewed with patient and/or caregiver    Nutrition Assessment     Malnutrition Assessment/Nutrition-Focused Physical Exam    Malnutrition in the context of acute illness or injury  Degree of Malnutrition: non-severe (moderate) malnutrition  Energy Intake: does not meet criteria  Interpretation of Weight Loss: >2% in 1 week  Body Fat:mild depletion  Area of Body Fat Loss: orbital region   Muscle Mass Loss: mild depletion  Area of Muscle Mass Loss: temple region - temporalis muscle  Fluid Accumulation: moderate to severe  Edema: 1+ edema - trace   8/15-Anasarca, 3+ pitting edema right upper extremity and bilateral lower extremity to, + scrotal edema  8/24-1+ edema to BLE; Right arm with significant edema  Reduced  Strength: unable to obtain  A minimum of two characteristics is recommended for diagnosis of either severe or non-severe malnutrition.    Chart Review    Reason Seen: continuous nutrition monitoring    Diagnosis:  CMO, unspecified  Acute combined systolic and diastolic HF  NSTEMI, type II s/y heart failure  LLL PE  Left BBB  ANNETTA  COPD    Relevant Medical History: COPD, previous heavy smoker/drinker    Nutrition-Related Medications: famotidine, furosemide, magnesium oxide.  Calorie Containing IV Medications: no significant kcals from medications at this time    Nutrition-Related Labs:  8/15 Cl 94, CO2 37, BUN 38.1, Ca 8, Mg 1.5, Total pro 5.5, Alb 2.6  8/19 Cl 89, CO2 43, Ca 8.3, Total pro 5.7, Alb 2.8, AST 45    Current Diet/PN: Diet heart healthy  Current Oral Supplements: none at this time and Boost Glucose Control  Current Tube Feeding: none at this time  Appetite/Oral Intake: good/% of meals  Factors Affecting  "Nutritional Intake: none identified at this time  Food/Voodoo/Cultural Preferences: none reported    Skin Integrity: bruised (ecchymotic)  Wound(s):   none documented    Comments    8/15 Pt seen for diet education and MST score- decreased appetite and reported 14-23lb wt loss PTA. Reports decreased appetite, early satiety and swelling began 1-2 months ago.   Eating % meals since admit and willing to trial Boost daily for additional nutrition. Reports UBW around 135-145lbs, current wt ~170lbs likely s/t fluid. On diuretics, anticipate decrease in wt during hospital stay. Magnesium being replaced.     Good po intake of meals. Denies any GI complaints at this time. Last BM today.      Good appetite and po intake of meals, drinking >/=1 Boost daily. No GI complaints. Noted decrease in wt, likely r/t fluid. Edema improving since admit.     Anthropometrics    Height: 5' 11" (180.3 cm) Height Method: Stated  Weight: 54.8 kg (120 lb 13 oz) Weight Method: Bed Scale  BMI (Calculated): 16.9  BMI Classification: underweight (BMI less than 18.5)  Ideal Body Weight (IBW), Male: 172 lb  % Ideal Body Weight, Male (lb): 78.49 %  Usual Body Weight (UBW), k.36 kg (135-145lb per pt)  % Usual Body Weight: 128.2  (usual weight provided by patient)    Wt Readings from Last 5 Encounters:   22 54.8 kg (120 lb 13 oz)     Weight Change(s) Since Admission:  22 60.7kg   22: 54.8kg  Admit Weight: 61.2 kg (135 lb)    Estimated Needs    Weight Used For Calorie Calculations: 78.5 kg (173 lb 1 oz) (Dry UBW)  Energy Calorie Requirements (kcal): 1952kcals/d (MSJ x 1.2SF)  Energy Need Method: [facility]  Weight Used For Protein Calculations: 78.5 kg (173 lb 1 oz)  Protein Requirements: 94g/d (1.2g/kg)     Temp: 98.7 °F (37.1 °C)       Enteral Nutrition    Patient not receiving enteral nutrition at this time.    Parenteral Nutrition    Patient not receiving parenteral nutrition support at this " time.    Evaluation of Received Nutrient Intake    Calories: meeting estimated needs  Protein: meeting estimated needs      Nutrition Diagnosis     PES: Malnutrition related to current medical condition as evidenced by underweight BMI, >2% wt loss x1 wk (some r/t fluid), physical evidence of muscle and fat loss, moderate to severe fluid retention. (continues)    Interventions/Goals     Intervention(s): collaboration with other providers  Goal: Meet greater than 75% of nutritional needs. (goal met)    Monitoring & Evaluation     Dietitian will monitor food and beverage intake, weight change, electrolyte and renal panel and food and nutrition knowledge skill.  Nutrition Risk/Follow-Up: high (follow-up in 1-4 days)

## 2022-08-24 NOTE — PLAN OF CARE
Aravind with [facility] met with pt for informational visit. He did explain the resources available with their service. Pt stated he is not ready for this , he plans to go home and he will get better.

## 2022-08-25 VITALS
DIASTOLIC BLOOD PRESSURE: 76 MMHG | RESPIRATION RATE: 19 BRPM | HEART RATE: 86 BPM | SYSTOLIC BLOOD PRESSURE: 112 MMHG | OXYGEN SATURATION: 99 % | BODY MASS INDEX: 16.91 KG/M2 | WEIGHT: 120.81 LBS | TEMPERATURE: 98 F | HEIGHT: 71 IN

## 2022-08-25 LAB
ANION GAP SERPL CALC-SCNC: 13 MEQ/L
ANION GAP SERPL CALC-SCNC: 15 MEQ/L
BACTERIA SPEC CULT: NORMAL
BASOPHILS # BLD AUTO: 0.02 X10(3)/MCL (ref 0–0.2)
BASOPHILS NFR BLD AUTO: 0.2 %
BUN SERPL-MCNC: 23.3 MG/DL (ref 8.4–25.7)
BUN SERPL-MCNC: 24 MG/DL (ref 8.4–25.7)
CALCIUM SERPL-MCNC: 9.2 MG/DL (ref 8.4–10.2)
CALCIUM SERPL-MCNC: 9.9 MG/DL (ref 8.4–10.2)
CHLORIDE SERPL-SCNC: 74 MMOL/L (ref 98–107)
CHLORIDE SERPL-SCNC: 75 MMOL/L (ref 98–107)
CO2 SERPL-SCNC: 50 MMOL/L (ref 22–29)
CO2 SERPL-SCNC: 51 MMOL/L (ref 22–29)
CREAT SERPL-MCNC: 0.63 MG/DL (ref 0.73–1.18)
CREAT SERPL-MCNC: 0.68 MG/DL (ref 0.73–1.18)
CREAT/UREA NIT SERPL: 34
CREAT/UREA NIT SERPL: 38
EOSINOPHIL # BLD AUTO: 0.03 X10(3)/MCL (ref 0–0.9)
EOSINOPHIL NFR BLD AUTO: 0.3 %
ERYTHROCYTE [DISTWIDTH] IN BLOOD BY AUTOMATED COUNT: 13.2 % (ref 11.5–17)
ESTROGEN SERPL-MCNC: NORMAL PG/ML
GFR SERPLBLD CREATININE-BSD FMLA CKD-EPI: >60 MLS/MIN/1.73/M2
GFR SERPLBLD CREATININE-BSD FMLA CKD-EPI: >60 MLS/MIN/1.73/M2
GLUCOSE SERPL-MCNC: 152 MG/DL (ref 74–100)
GLUCOSE SERPL-MCNC: 197 MG/DL (ref 74–100)
HCT VFR BLD AUTO: 27.6 % (ref 42–52)
HGB BLD-MCNC: 8.6 GM/DL (ref 14–18)
IMM GRANULOCYTES # BLD AUTO: 0.03 X10(3)/MCL (ref 0–0.04)
IMM GRANULOCYTES NFR BLD AUTO: 0.3 %
INSULIN SERPL-ACNC: NORMAL U[IU]/ML
LAB AP CLINICAL INFORMATION: NORMAL
LAB AP GROSS DESCRIPTION: NORMAL
LAB AP NON-GYN INTERPRETATION SPECIMEN 1: NORMAL
LAB AP NON-GYN SPECIMEN 1 ADEQUACY: NORMAL
LYMPHOCYTES # BLD AUTO: 0.87 X10(3)/MCL (ref 0.6–4.6)
LYMPHOCYTES NFR BLD AUTO: 8.5 %
MCH RBC QN AUTO: 31.7 PG (ref 27–31)
MCHC RBC AUTO-ENTMCNC: 31.2 MG/DL (ref 33–36)
MCV RBC AUTO: 101.8 FL (ref 80–94)
MONOCYTES # BLD AUTO: 0.79 X10(3)/MCL (ref 0.1–1.3)
MONOCYTES NFR BLD AUTO: 7.7 %
NEUTROPHILS # BLD AUTO: 8.5 X10(3)/MCL (ref 2.1–9.2)
NEUTROPHILS NFR BLD AUTO: 83 %
NRBC BLD AUTO-RTO: 0 %
PLATELET # BLD AUTO: 280 X10(3)/MCL (ref 130–400)
PMV BLD AUTO: 10.6 FL (ref 7.4–10.4)
POTASSIUM SERPL-SCNC: 4 MMOL/L (ref 3.5–5.1)
POTASSIUM SERPL-SCNC: 4.6 MMOL/L (ref 3.5–5.1)
RBC # BLD AUTO: 2.71 X10(6)/MCL (ref 4.7–6.1)
SODIUM SERPL-SCNC: 139 MMOL/L (ref 136–145)
SODIUM SERPL-SCNC: 139 MMOL/L (ref 136–145)
WBC # SPEC AUTO: 10.3 X10(3)/MCL (ref 4.5–11.5)

## 2022-08-25 PROCEDURE — 25000003 PHARM REV CODE 250: Performed by: NURSE PRACTITIONER

## 2022-08-25 PROCEDURE — 63600175 PHARM REV CODE 636 W HCPCS: Performed by: NURSE PRACTITIONER

## 2022-08-25 PROCEDURE — 36415 COLL VENOUS BLD VENIPUNCTURE: CPT | Performed by: INTERNAL MEDICINE

## 2022-08-25 PROCEDURE — 25000003 PHARM REV CODE 250: Performed by: INTERNAL MEDICINE

## 2022-08-25 PROCEDURE — 94761 N-INVAS EAR/PLS OXIMETRY MLT: CPT

## 2022-08-25 PROCEDURE — 80048 BASIC METABOLIC PNL TOTAL CA: CPT | Performed by: NURSE PRACTITIONER

## 2022-08-25 PROCEDURE — 36415 COLL VENOUS BLD VENIPUNCTURE: CPT | Performed by: NURSE PRACTITIONER

## 2022-08-25 PROCEDURE — 27000221 HC OXYGEN, UP TO 24 HOURS

## 2022-08-25 PROCEDURE — 80048 BASIC METABOLIC PNL TOTAL CA: CPT | Performed by: INTERNAL MEDICINE

## 2022-08-25 PROCEDURE — 85025 COMPLETE CBC W/AUTO DIFF WBC: CPT | Performed by: NURSE PRACTITIONER

## 2022-08-25 PROCEDURE — 63600175 PHARM REV CODE 636 W HCPCS: Performed by: STUDENT IN AN ORGANIZED HEALTH CARE EDUCATION/TRAINING PROGRAM

## 2022-08-25 RX ORDER — ENOXAPARIN SODIUM 100 MG/ML
1 INJECTION SUBCUTANEOUS
Status: DISCONTINUED | OUTPATIENT
Start: 2022-08-25 | End: 2022-08-26 | Stop reason: HOSPADM

## 2022-08-25 RX ORDER — FUROSEMIDE 40 MG/1
40 TABLET ORAL 2 TIMES DAILY
Qty: 60 TABLET | Refills: 11 | Status: SHIPPED | OUTPATIENT
Start: 2022-08-25 | End: 2023-08-25

## 2022-08-25 RX ORDER — NAPROXEN SODIUM 220 MG/1
81 TABLET, FILM COATED ORAL DAILY
Qty: 30 TABLET | Refills: 0 | Status: SHIPPED | OUTPATIENT
Start: 2022-08-26 | End: 2022-09-25

## 2022-08-25 RX ORDER — ATORVASTATIN CALCIUM 40 MG/1
40 TABLET, FILM COATED ORAL NIGHTLY
Qty: 30 TABLET | Refills: 0 | Status: SHIPPED | OUTPATIENT
Start: 2022-08-25 | End: 2022-09-24

## 2022-08-25 RX ORDER — IPRATROPIUM BROMIDE AND ALBUTEROL SULFATE 2.5; .5 MG/3ML; MG/3ML
3 SOLUTION RESPIRATORY (INHALATION) EVERY 4 HOURS PRN
Qty: 75 ML | Refills: 0 | Status: SHIPPED | OUTPATIENT
Start: 2022-08-25 | End: 2023-08-25

## 2022-08-25 RX ORDER — FAMOTIDINE 20 MG/1
20 TABLET, FILM COATED ORAL 2 TIMES DAILY
Qty: 60 TABLET | Refills: 0 | Status: SHIPPED | OUTPATIENT
Start: 2022-08-25 | End: 2022-09-24

## 2022-08-25 RX ADMIN — ASPIRIN 81 MG CHEWABLE TABLET 81 MG: 81 TABLET CHEWABLE at 08:08

## 2022-08-25 RX ADMIN — ENOXAPARIN SODIUM 50 MG: 80 INJECTION SUBCUTANEOUS at 08:08

## 2022-08-25 RX ADMIN — FAMOTIDINE 20 MG: 20 TABLET, FILM COATED ORAL at 08:08

## 2022-08-25 RX ADMIN — FUROSEMIDE 40 MG: 10 INJECTION, SOLUTION INTRAMUSCULAR; INTRAVENOUS at 11:08

## 2022-08-25 NOTE — PLAN OF CARE
Dr Moss states pt wants to speak with hospice again. I met with pt to confirm. Pt states he want to go home with [facility] . He states he does not want to die here, he wants to die at home. FOC signed for [facility] and Aravind notified pt want to see him again today and to transition home with hospice.

## 2022-08-25 NOTE — PROGRESS NOTES
[facility] - 3rd Floor Medical Telemetry  Cardiology  Progress Note    Patient Name: Harpreet Rodas  MRN: [id number]  Admission Date: 8/13/2022  Hospital Length of Stay: 12 days  Code Status: DNR   Attending Provider: Ana Rosa Fry MD   Consulting Provider: ART De Oliveira  Primary Care Physician: Primary Doctor No  Principal Problem:Anasarca    Patient information was obtained from patient and ER records.     Subjective:     Chief Complaint:       HPI:   Mr. Mayen is a 57y/o male, unknown to [facility], with PMHx significant for COPD and previous heavy smoker/drinker who presented to [facility] via EMS for c/o SOB, MON, orthopnea, peripheral edema, diarrhea, and chest pain described as sticking sensation. No radiation or associated symptoms.  RA sat upon EMS arrival-88% and he was placed on Bipap. In ED, workup significant for BUN/creatinine 45/1.36, K 6.1, D-dimer 3.05, BNP 3280, Troponin 0.134-0.120-0.105. CXR revealing bilateral pleural effusions. Right> left with possible RLL opacification. CTA chest obtained LLL PE, large right pleural effusion, and findings of anasarca. EF revealing EF 15%, Grade III LVDD and RA/RV enlargement. He was placed on diuretics with consult placed to [facility] for PVC and 2nd degree Type II AVB. Review of tele reveals SR with non-conducted PACs. No 2nd degree heart block noted.     Hospital Course:  8/16/22: Patient awake in bed. Good diuresis with Lasix. Still volume overloaded.   8/17/22: Patient awake in bed. Continues to require O2 support. S/p right sided thoracentesis with reported removal of 1030 L straw colored fluid. CO2 rising. Patient started on diamox. Renal indices stable. -5.5L UOP x 24hr  8/18/22: Patient sitting up in bedside chair. Reports SOB and peripheral edema improving.   8/19/22: Patient awake in bed. NAD. Diminished breath sounds.   8/20/22: Patient awake in bed. NAD. Still orthopneic. Diminished breath sounds to Right lung fields. Edema continues to  recede.   8/21/22: Patient awake in bed. +SOB/orthopneic. Good diuresis with lasix.   8/22/22: Patient awake in bed. Continues to require O2 support and remains orthopneic. Breath sounds to right lung fields very diminished. Patient is coughing up large blood clots. Currently on weight based Lovenox BID.   8/23/22: Patient is somnolent this morning. He did receive a dose of Ativan overnight. ABGs reveal a compensated respiratory acidosis. PCO2  105. Pleural fluid from thoracentesis is transudative. He is still having hemoptysis of large dark red blood clots. Anemia worsening. Lovenox has been placed on hold due to hemoptysis and worsening anemia.   8.24.22; Patient awake in bed. Breathing is labored. O2 sat 66%. Discussed with Dr. Mancuso at bedside- patient does not wish to be intubated. Will meet with Dr. Daigle later today to discuss options such as comfort care. O2 increased per oxymask until O2 sat recovered and then weaned down for O2 sat 88%. Patient has no reserve and decompensates with minimal activity/speaking      PMH: COPD  PSH: mandible fx surgery,  Family History: Mother- cancer; brother- cancer  Social History: previous heavy smoker (2ppd)/drinker- quit 6/22    Previous Cardiac Diagnostics:   Venous US BUE 08/14/22:  No evidence of deep or superficial vein thrombosis in bilateral upper extremities.     Venous US BLE 08/14/22:  Negative DVT to BLE    TTE 08/13/22:  Severely decreased Systolic function. Estimated EF 15%. Grade III LVDD. There is pulmonary HTN, Mild TR. Moderate RV enlargement. Moderate RA enlargement. Intermediate CVP 8 mmHg. Estimated PASP 50mmHg    Review of Systems   Respiratory: Positive for cough and shortness of breath.    Neurological: Positive for weakness.   Psychiatric/Behavioral: The patient is nervous/anxious.        Objective:     Vital Signs (Most Recent):  Temp: 97.9 °F (36.6 °C) (08/24/22 1932)  Pulse: 107 (08/25/22 0337)  Resp: 18 (08/25/22 0337)  BP: 114/65  (08/25/22 0337)  SpO2: 100 % (08/25/22 0337) Vital Signs (24h Range):  Temp:  [97.6 °F (36.4 °C)-98.7 °F (37.1 °C)] 97.9 °F (36.6 °C)  Pulse:  [104-113] 107  Resp:  [18-28] 18  SpO2:  [90 %-100 %] 100 %  BP: (114-128)/(65-81) 114/65     Weight: 54.8 kg (120 lb 13 oz)  Body mass index is 16.85 kg/m².    SpO2: 100 %  O2 Device (Oxygen Therapy): Oxymask      Intake/Output Summary (Last 24 hours) at 8/25/2022 0604  Last data filed at 8/24/2022 2200  Gross per 24 hour   Intake 780 ml   Output 400 ml   Net 380 ml       Lines/Drains/Airways     Peripheral Intravenous Line  Duration                Peripheral IV - Single Lumen 08/14/22 2000 Left;Posterior Forearm 10 days                Significant Labs:  Recent Results (from the past 72 hour(s))   Basic Metabolic Panel    Collection Time: 08/22/22  6:44 AM   Result Value Ref Range    Sodium Level 137 136 - 145 mmol/L    Potassium Level 4.2 3.5 - 5.1 mmol/L    Chloride 85 (L) 98 - 107 mmol/L    Carbon Dioxide 46 (HH) 22 - 29 mmol/L    Glucose Level 97 74 - 100 mg/dL    Blood Urea Nitrogen 15.6 8.4 - 25.7 mg/dL    Creatinine 0.57 (L) 0.73 - 1.18 mg/dL    BUN/Creatinine Ratio 27     Calcium Level Total 9.0 8.4 - 10.2 mg/dL    Anion Gap 6.0 mEq/L    eGFR >60 mls/min/1.73/m2   CBC with Differential    Collection Time: 08/22/22  6:44 AM   Result Value Ref Range    WBC 10.6 4.5 - 11.5 x10(3)/mcL    RBC 2.91 (L) 4.70 - 6.10 x10(6)/mcL    Hgb 8.8 (L) 14.0 - 18.0 gm/dL    Hct 28.5 (L) 42.0 - 52.0 %    MCV 97.9 (H) 80.0 - 94.0 fL    MCH 30.2 27.0 - 31.0 pg    MCHC 30.9 (L) 33.0 - 36.0 mg/dL    RDW 13.2 11.5 - 17.0 %    Platelet 142 130 - 400 x10(3)/mcL    MPV 10.8 (H) 7.4 - 10.4 fL    Neut % 74.8 %    Lymph % 13.8 %    Mono % 9.8 %    Eos % 0.7 %    Basophil % 0.3 %    Lymph # 1.46 0.6 - 4.6 x10(3)/mcL    Neut # 7.9 2.1 - 9.2 x10(3)/mcL    Mono # 1.03 0.1 - 1.3 x10(3)/mcL    Eos # 0.07 0 - 0.9 x10(3)/mcL    Baso # 0.03 0 - 0.2 x10(3)/mcL    IG# 0.06 (H) 0 - 0.04 x10(3)/mcL    IG% 0.6 %     NRBC% 0.0 %   Respiratory Culture    Collection Time: 08/22/22  5:05 PM    Specimen: Sputum   Result Value Ref Range    Respiratory Culture Normal respiratory patricia     GRAM STAIN Quality 3+ (A)     GRAM STAIN Moderate Gram positive cocci (A)     GRAM STAIN Moderate Gram Negative Rods (A)     GRAM STAIN Few Gram negative diplococci (A)    Stool Culture **CANNOT BE ORDERED STAT**    Collection Time: 08/23/22  5:00 AM    Specimen: Stool   Result Value Ref Range    Stool Culture       Negative for Salmonella, Shigella, Campylobacter, Vibrio, Aeromonas, Pleisiomonas,Yersinia, or Shiga Toxin 1 and 2.   Comprehensive Metabolic Panel    Collection Time: 08/23/22  7:15 AM   Result Value Ref Range    Sodium Level 137 136 - 145 mmol/L    Potassium Level 3.8 3.5 - 5.1 mmol/L    Chloride 79 (L) 98 - 107 mmol/L    Carbon Dioxide 46 (HH) 22 - 29 mmol/L    Glucose Level 95 74 - 100 mg/dL    Blood Urea Nitrogen 16.2 8.4 - 25.7 mg/dL    Creatinine 0.56 (L) 0.73 - 1.18 mg/dL    Calcium Level Total 9.4 8.4 - 10.2 mg/dL    Protein Total 6.6 6.4 - 8.3 gm/dL    Albumin Level 2.8 (L) 3.5 - 5.0 gm/dL    Globulin 3.8 (H) 2.4 - 3.5 gm/dL    Albumin/Globulin Ratio 0.7 (L) 1.1 - 2.0 ratio    Bilirubin Total 0.5 <=1.5 mg/dL    Alkaline Phosphatase 101 40 - 150 unit/L    Alanine Aminotransferase 34 0 - 55 unit/L    Aspartate Aminotransferase 43 (H) 5 - 34 unit/L    eGFR >60 mls/min/1.73/m2   CBC with Differential    Collection Time: 08/23/22  7:15 AM   Result Value Ref Range    WBC 10.9 4.5 - 11.5 x10(3)/mcL    RBC 2.63 (L) 4.70 - 6.10 x10(6)/mcL    Hgb 7.9 (L) 14.0 - 18.0 gm/dL    Hct 26.6 (L) 42.0 - 52.0 %    .1 (H) 80.0 - 94.0 fL    MCH 30.0 27.0 - 31.0 pg    MCHC 29.7 (L) 33.0 - 36.0 mg/dL    RDW 13.3 11.5 - 17.0 %    Platelet 191 130 - 400 x10(3)/mcL    MPV 10.3 7.4 - 10.4 fL    Neut % 78.7 %    Lymph % 10.2 %    Mono % 9.8 %    Eos % 0.6 %    Basophil % 0.2 %    Lymph # 1.11 0.6 - 4.6 x10(3)/mcL    Neut # 8.6 2.1 - 9.2 x10(3)/mcL     Mono # 1.06 0.1 - 1.3 x10(3)/mcL    Eos # 0.06 0 - 0.9 x10(3)/mcL    Baso # 0.02 0 - 0.2 x10(3)/mcL    IG# 0.05 (H) 0 - 0.04 x10(3)/mcL    IG% 0.5 %    NRBC% 0.0 %   POCT ARTERIAL BLOOD GAS    Collection Time: 08/23/22 10:34 AM   Result Value Ref Range    POC PH 7.40 7.35 - 7.45    POC PCO2 105 (AA) 19.0 - 50.0 mmHg    POC PO2 93 80 - 100 mmHg    POC HEMOGLOBIN 7.3 (A) 12 - 16 g/dL    POC SATURATED O2 97.2 %    POC O2Hb 96.9 94.0 - 97.0 %    POC COHb 2.2 %    POC MetHb 0.30 (A) 0.40 - 1.5 %    POC Potassium 3.6 3.5 - 5.0 mmol/l    POC Sodium 134 (A) 137 - 145 mmol/l    POC Ionized Calcium 1.14 1.12 - 1.23 mmol/l    Correct Temperature (PH) 7.40 7.35 - 7.45    Corrected Temperature (pCO2) 105 (AA) 19.0 - 50.0 mmHg    Corrected Temperature (pO2) 93 80 - 100 mmHg    POC HCO3 65.0 (A) 22.0 - 26.0 mmol/l    Base Deficit 36.1 (A) -2.00 - 2.00 mmol/l    POC Temp 37.0 °C    Specimen source Arterial sample    Basic Metabolic Panel    Collection Time: 08/24/22  4:16 AM   Result Value Ref Range    Sodium Level 140 136 - 145 mmol/L    Potassium Level 3.8 3.5 - 5.1 mmol/L    Chloride 78 (L) 98 - 107 mmol/L    Carbon Dioxide 49 (HH) 22 - 29 mmol/L    Glucose Level 145 (H) 74 - 100 mg/dL    Blood Urea Nitrogen 21.7 8.4 - 25.7 mg/dL    Creatinine 0.61 (L) 0.73 - 1.18 mg/dL    BUN/Creatinine Ratio 36     Calcium Level Total 9.4 8.4 - 10.2 mg/dL    Anion Gap 13.0 mEq/L    eGFR >60 mls/min/1.73/m2   CBC with Differential    Collection Time: 08/24/22  4:16 AM   Result Value Ref Range    WBC 10.4 4.5 - 11.5 x10(3)/mcL    RBC 2.51 (L) 4.70 - 6.10 x10(6)/mcL    Hgb 7.9 (L) 14.0 - 18.0 gm/dL    Hct 24.8 (L) 42.0 - 52.0 %    MCV 98.8 (H) 80.0 - 94.0 fL    MCH 31.5 (H) 27.0 - 31.0 pg    MCHC 31.9 (L) 33.0 - 36.0 mg/dL    RDW 13.2 11.5 - 17.0 %    Platelet 226 130 - 400 x10(3)/mcL    MPV 9.6 7.4 - 10.4 fL    Neut % 80.2 %    Lymph % 9.5 %    Mono % 8.6 %    Eos % 0.5 %    Basophil % 0.4 %    Lymph # 0.99 0.6 - 4.6 x10(3)/mcL    Neut # 8.4  2.1 - 9.2 x10(3)/mcL    Mono # 0.90 0.1 - 1.3 x10(3)/mcL    Eos # 0.05 0 - 0.9 x10(3)/mcL    Baso # 0.04 0 - 0.2 x10(3)/mcL    IG# 0.08 (H) 0 - 0.04 x10(3)/mcL    IG% 0.8 %    NRBC% 0.0 %       Significant Imaging:  Imaging Results          CTA Chest Abdomen Non Coronary (Final result)  Result time 08/13/22 15:55:52   Procedure changed from CTA Chest Non-Coronary (PE Study)     Final result by Basilio De Oliveira MD (08/13/22 15:55:52)                 Impression:      Left lower lobe pulmonary thromboembolism is noted.    Large right pleural effusion is present.    Findings of anasarca.    The bladder wall is prominent.  Correlate with urinalysis.    There are cortical defects of the bilateral kidneys likely related to chronic renal disease and scarring, however infarcts are less likely.    The liver appears heterogeneous which may be related to phase of contrast however is cirrhosis is not excluded.  Cardiomegaly is present.  Further evaluation may be obtained with ECHO.    Findings reported to Dr. Little prior to interpretation.      Electronically signed by: Basilio De Oliveira  Date:    08/13/2022  Time:    15:55             Narrative:    EXAMINATION:  CTA CHEST ABDOMEN NON CORONARY (XPD)    CLINICAL HISTORY:  Pulmonary embolism (PE) suspected, positive D-dimer;    TECHNIQUE:  Axial CTA images of the chest, abdomen, and pelvis were obtained With Contrast. Sagittal and coronal reconstructed images were available for review.    Automatic exposure control was utilized to reduce the patient's radiation dose.    DLP = 582    COMPARISON:  No prior images available for comparison.    FINDINGS:  PULMONARY ARTERY: Thrombus is identified in the left lower lobe pulmonary artery.    AORTA: The thoracoabdominal aorta is normal in course and caliber. Scattered atherosclerotic disease is noted.    HEART: The heart is enlarged.  No pericardial effusion.    THYROID GLAND: The thyroid is not enlarged. There are no nodules  identified.    AIRWAYS: Trachea is midline and tracheobronchial tree is patent.    LUNGS: Large right effusion with subsegmental atelectatic changes at the right base.  Emphysematous changes throughout the lungs.    THROACIC LYMPH NODES: There is no significant mediastinal, axillary or hilar lymphadenopathy.    HEPATOBILIARY: Somewhat nodular contour of the superior aspect of the liver with some heterogeneity may be related to phase of contrast versus cirrhosis.  Correlate with patient's history.  The gallbladder is normal.    SPLEEN: Normal    PANCREAS: No focal masses or ductal dilatation.    ADRENALS: No adrenal nodules.    KIDNEYS: No evidence of hydronephrosis.  Bilateral renal cortical perfusional defects likely related to scarring in chronic kidney disease.  Correlate with patient's history.  Less likely infarcts.    ABDOMINAL LYMPHADENOPATHY/RETROPERITONEUM: There is no retroperitoneal lymphadenopathy.    BOWEL: No acute bowel related abnormalities.    PELVIC VISCERA: The bladder wall is somewhat prominent.  Correlate with urinalysis.    PELVIC LYMPH NODES: No lymphadenopathy.    PERITONEUM/ BODY WALL: Diffuse body wall edema with moderate ascites noted.    SKELETAL: No aggressive appearing lytic/blastic lesion. No acute fractures, subluxations or dislocations.                               X-Ray Chest 1 View (Final result)  Result time 08/13/22 12:44:56    Final result by Basilio De Oliveira MD (08/13/22 12:44:56)                 Impression:      Right greater than left pleural effusion with possible right lower lobe opacification.  Underlying infectious process is not excluded.  Recommend continued follow-up.      Electronically signed by: Basilio De Oliveira  Date:    08/13/2022  Time:    12:44             Narrative:    EXAMINATION:  XR CHEST 1 VIEW    CLINICAL HISTORY:  shortness of breath;    TECHNIQUE:  Single view of the chest    COMPARISON:  No prior imaging available for comparison.    FINDINGS:  Right  greater than left pleural effusion with possible right lower lobe opacification.  Underlying infectious process is not excluded.  Recommend continued follow-up.                              Telemetry:          Physical Exam  Constitutional:       Appearance: He is ill-appearing.   HENT:      Mouth/Throat:      Mouth: Mucous membranes are dry.      Comments: Poor dentition  Cardiovascular:      Rate and Rhythm: Regular rhythm. Tachycardia present.      Heart sounds: Normal heart sounds.   Pulmonary:      Effort: Respiratory distress present.      Breath sounds: Rales present.      Comments: Diminished breath sounds to right lung fields    + hemoptysis  Abdominal:      Palpations: Abdomen is soft.   Musculoskeletal:      Right lower leg: Edema present.      Left lower leg: Edema present.   Skin:     General: Skin is warm.   Neurological:      Mental Status: He is alert.   Psychiatric:         Mood and Affect: Mood is anxious.         Current Inpatient Medications:    Current Facility-Administered Medications:     acetaminophen tablet 650 mg, 650 mg, Oral, Q4H PRN, Reginald Barker MD, 650 mg at 08/20/22 1311    albuterol-ipratropium 2.5 mg-0.5 mg/3 mL nebulizer solution 3 mL, 3 mL, Nebulization, Q4H PRN, Reginald Barker MD    aspirin chewable tablet 81 mg, 81 mg, Oral, Daily, ART De Oliveira, 81 mg at 08/24/22 0858    atorvastatin tablet 40 mg, 40 mg, Oral, QHS, Kwasi Thao MD, 40 mg at 08/24/22 2007    famotidine tablet 20 mg, 20 mg, Oral, BID, Reginald Barker MD, 20 mg at 08/24/22 2007    furosemide injection 40 mg, 40 mg, Intravenous, Q12H, ART De Oliveira, 40 mg at 08/24/22 2209    melatonin tablet 6 mg, 6 mg, Oral, Nightly PRN, Reginald Barker MD    promethazine tablet 25 mg, 25 mg, Oral, Q6H PRN, Reginald Barker MD    sodium chloride 0.9% flush 10 mL, 10 mL, Intravenous, PRN, Yuni Little MD    sodium chloride 0.9% flush 10 mL, 10 mL, Intravenous, PRN, Reginald Barker MD    traZODone  tablet 50 mg, 50 mg, Oral, Nightly PRN, Aaln Daigle MD, 50 mg at 08/24/22 2007         VTE Risk Mitigation (From admission, onward)         Ordered     IP VTE HIGH RISK PATIENT  Once         08/13/22 1523     Place sequential compression device  Until discontinued         08/13/22 1523     Place GIACOMO hose  Until discontinued         08/13/22 1523                Assessment:   CMO, unspecified  --EF 15%  Acute combined systolic and diastolic HF  --EF 15%; Grade III LVDD  --BNP 3280  --s/p right thoracentesis with 1030 fluid removal (transudative)  NSTEMI, Type II s/t heart failure  --Troponin 0.134->0.120->0.105  Left BBB  LLL PE  Hemoptysis  Anemia  --worsening   Metabolic alkalosis  --worsening  ANNETTA  --resolved  COPD  Former smoker  No hx GIB    Plan:    Still overloaded and decompensates with minimal activity. Edema increasing. Will increase Lasix to 40 mg IVP bid.   Ensure accurate I&O's/daily weights  Continue to hold Lovenox due to hemoptysis.   Patient has poor prognosis and condition is guarded. Plans to discuss wishes with Palliative Care today    Cardiology  [facility] - 3rd Floor Medical Telemetry  08/25/2022

## 2022-08-25 NOTE — PROGRESS NOTES
[facility] - 3rd Floor Medical Telemetry  Cardiology  Progress Note    Patient Name: Harpreet Rodas  MRN: [id number]  Admission Date: 8/13/2022  Hospital Length of Stay: 12 days  Code Status: DNR   Attending Provider: Ana Rosa Fry MD   Consulting Provider: ART De Oliveira  Primary Care Physician: Primary Doctor No  Principal Problem:Anasarca    Patient information was obtained from patient and ER records.     Subjective:     Chief Complaint:       HPI:   Mr. Mayen is a 57y/o male, unknown to [facility], with PMHx significant for COPD and previous heavy smoker/drinker who presented to [facility] via EMS for c/o SOB, MON, orthopnea, peripheral edema, diarrhea, and chest pain described as sticking sensation. No radiation or associated symptoms.  RA sat upon EMS arrival-88% and he was placed on Bipap. In ED, workup significant for BUN/creatinine 45/1.36, K 6.1, D-dimer 3.05, BNP 3280, Troponin 0.134-0.120-0.105. CXR revealing bilateral pleural effusions. Right> left with possible RLL opacification. CTA chest obtained LLL PE, large right pleural effusion, and findings of anasarca. EF revealing EF 15%, Grade III LVDD and RA/RV enlargement. He was placed on diuretics with consult placed to [facility] for PVC and 2nd degree Type II AVB. Review of tele reveals SR with non-conducted PACs. No 2nd degree heart block noted.     Hospital Course:  8/16/22: Patient awake in bed. Good diuresis with Lasix. Still volume overloaded.   8/17/22: Patient awake in bed. Continues to require O2 support. S/p right sided thoracentesis with reported removal of 1030 L straw colored fluid. CO2 rising. Patient started on diamox. Renal indices stable. -5.5L UOP x 24hr  8/18/22: Patient sitting up in bedside chair. Reports SOB and peripheral edema improving.   8/19/22: Patient awake in bed. NAD. Diminished breath sounds.   8/20/22: Patient awake in bed. NAD. Still orthopneic. Diminished breath sounds to Right lung fields. Edema continues to  recede.   8/21/22: Patient awake in bed. +SOB/orthopneic. Good diuresis with lasix.   8/22/22: Patient awake in bed. Continues to require O2 support and remains orthopneic. Breath sounds to right lung fields very diminished. Patient is coughing up large blood clots. Currently on weight based Lovenox BID.   8/23/22: Patient is somnolent this morning. He did receive a dose of Ativan overnight. ABGs reveal a compensated respiratory acidosis. PCO2  105. Pleural fluid from thoracentesis is transudative. He is still having hemoptysis of large dark red blood clots. Anemia worsening. Lovenox has been placed on hold due to hemoptysis and worsening anemia.   8.24.22; Patient awake in bed. Breathing is labored. O2 sat 66%. Discussed with Dr. Mancuso at bedside- patient does not wish to be intubated. Will meet with Dr. Daigle later today to discuss options such as comfort care. O2 increased per oxymask until O2 sat recovered and then weaned down for O2 sat 88%. Patient has no reserve and decompensates with minimal activity/speaking  8.25.22: Patient awake in bed. Dyspnea with any activity. O2 via oxymask. Patient reports he has made decision to go home with hospice.       PMH: COPD  PSH: mandible fx surgery,  Family History: Mother- cancer; brother- cancer  Social History: previous heavy smoker (2ppd)/drinker- quit 6/22    Previous Cardiac Diagnostics:   Venous US BUE 08/14/22:  No evidence of deep or superficial vein thrombosis in bilateral upper extremities.     Venous US BLE 08/14/22:  Negative DVT to BLE    TTE 08/13/22:  Severely decreased Systolic function. Estimated EF 15%. Grade III LVDD. There is pulmonary HTN, Mild TR. Moderate RV enlargement. Moderate RA enlargement. Intermediate CVP 8 mmHg. Estimated PASP 50mmHg    Review of Systems   Respiratory: Positive for cough and shortness of breath.    Neurological: Positive for weakness.   Psychiatric/Behavioral: The patient is nervous/anxious.        Objective:      Vital Signs (Most Recent):  Temp: 97.7 °F (36.5 °C) (08/25/22 0731)  Pulse: 104 (08/25/22 0731)  Resp: (!) 22 (08/25/22 0731)  BP: 106/72 (08/25/22 0731)  SpO2: 98 % (08/25/22 0731) Vital Signs (24h Range):  Temp:  [97.7 °F (36.5 °C)-98.7 °F (37.1 °C)] 97.7 °F (36.5 °C)  Pulse:  [104-113] 104  Resp:  [18-22] 22  SpO2:  [96 %-100 %] 98 %  BP: (106-128)/(65-81) 106/72     Weight: 54.8 kg (120 lb 13 oz)  Body mass index is 16.85 kg/m².    SpO2: 98 %  O2 Device (Oxygen Therapy): Oxymask      Intake/Output Summary (Last 24 hours) at 8/25/2022 0944  Last data filed at 8/24/2022 2200  Gross per 24 hour   Intake 780 ml   Output 400 ml   Net 380 ml       Lines/Drains/Airways     Peripheral Intravenous Line  Duration                Peripheral IV - Single Lumen 08/14/22 2000 Left;Posterior Forearm 10 days                Significant Labs:  Recent Results (from the past 72 hour(s))   Respiratory Culture    Collection Time: 08/22/22  5:05 PM    Specimen: Sputum   Result Value Ref Range    Respiratory Culture Normal respiratory patricia     GRAM STAIN Quality 3+ (A)     GRAM STAIN Moderate Gram positive cocci (A)     GRAM STAIN Moderate Gram Negative Rods (A)     GRAM STAIN Few Gram negative diplococci (A)    Stool Culture **CANNOT BE ORDERED STAT**    Collection Time: 08/23/22  5:00 AM    Specimen: Stool   Result Value Ref Range    Stool Culture       Negative for Salmonella, Shigella, Campylobacter, Vibrio, Aeromonas, Pleisiomonas,Yersinia, or Shiga Toxin 1 and 2.   Comprehensive Metabolic Panel    Collection Time: 08/23/22  7:15 AM   Result Value Ref Range    Sodium Level 137 136 - 145 mmol/L    Potassium Level 3.8 3.5 - 5.1 mmol/L    Chloride 79 (L) 98 - 107 mmol/L    Carbon Dioxide 46 (HH) 22 - 29 mmol/L    Glucose Level 95 74 - 100 mg/dL    Blood Urea Nitrogen 16.2 8.4 - 25.7 mg/dL    Creatinine 0.56 (L) 0.73 - 1.18 mg/dL    Calcium Level Total 9.4 8.4 - 10.2 mg/dL    Protein Total 6.6 6.4 - 8.3 gm/dL    Albumin Level 2.8  (L) 3.5 - 5.0 gm/dL    Globulin 3.8 (H) 2.4 - 3.5 gm/dL    Albumin/Globulin Ratio 0.7 (L) 1.1 - 2.0 ratio    Bilirubin Total 0.5 <=1.5 mg/dL    Alkaline Phosphatase 101 40 - 150 unit/L    Alanine Aminotransferase 34 0 - 55 unit/L    Aspartate Aminotransferase 43 (H) 5 - 34 unit/L    eGFR >60 mls/min/1.73/m2   CBC with Differential    Collection Time: 08/23/22  7:15 AM   Result Value Ref Range    WBC 10.9 4.5 - 11.5 x10(3)/mcL    RBC 2.63 (L) 4.70 - 6.10 x10(6)/mcL    Hgb 7.9 (L) 14.0 - 18.0 gm/dL    Hct 26.6 (L) 42.0 - 52.0 %    .1 (H) 80.0 - 94.0 fL    MCH 30.0 27.0 - 31.0 pg    MCHC 29.7 (L) 33.0 - 36.0 mg/dL    RDW 13.3 11.5 - 17.0 %    Platelet 191 130 - 400 x10(3)/mcL    MPV 10.3 7.4 - 10.4 fL    Neut % 78.7 %    Lymph % 10.2 %    Mono % 9.8 %    Eos % 0.6 %    Basophil % 0.2 %    Lymph # 1.11 0.6 - 4.6 x10(3)/mcL    Neut # 8.6 2.1 - 9.2 x10(3)/mcL    Mono # 1.06 0.1 - 1.3 x10(3)/mcL    Eos # 0.06 0 - 0.9 x10(3)/mcL    Baso # 0.02 0 - 0.2 x10(3)/mcL    IG# 0.05 (H) 0 - 0.04 x10(3)/mcL    IG% 0.5 %    NRBC% 0.0 %   POCT ARTERIAL BLOOD GAS    Collection Time: 08/23/22 10:34 AM   Result Value Ref Range    POC PH 7.40 7.35 - 7.45    POC PCO2 105 (AA) 19.0 - 50.0 mmHg    POC PO2 93 80 - 100 mmHg    POC HEMOGLOBIN 7.3 (A) 12 - 16 g/dL    POC SATURATED O2 97.2 %    POC O2Hb 96.9 94.0 - 97.0 %    POC COHb 2.2 %    POC MetHb 0.30 (A) 0.40 - 1.5 %    POC Potassium 3.6 3.5 - 5.0 mmol/l    POC Sodium 134 (A) 137 - 145 mmol/l    POC Ionized Calcium 1.14 1.12 - 1.23 mmol/l    Correct Temperature (PH) 7.40 7.35 - 7.45    Corrected Temperature (pCO2) 105 (AA) 19.0 - 50.0 mmHg    Corrected Temperature (pO2) 93 80 - 100 mmHg    POC HCO3 65.0 (A) 22.0 - 26.0 mmol/l    Base Deficit 36.1 (A) -2.00 - 2.00 mmol/l    POC Temp 37.0 °C    Specimen source Arterial sample    Basic Metabolic Panel    Collection Time: 08/24/22  4:16 AM   Result Value Ref Range    Sodium Level 140 136 - 145 mmol/L    Potassium Level 3.8 3.5 - 5.1  mmol/L    Chloride 78 (L) 98 - 107 mmol/L    Carbon Dioxide 49 (HH) 22 - 29 mmol/L    Glucose Level 145 (H) 74 - 100 mg/dL    Blood Urea Nitrogen 21.7 8.4 - 25.7 mg/dL    Creatinine 0.61 (L) 0.73 - 1.18 mg/dL    BUN/Creatinine Ratio 36     Calcium Level Total 9.4 8.4 - 10.2 mg/dL    Anion Gap 13.0 mEq/L    eGFR >60 mls/min/1.73/m2   CBC with Differential    Collection Time: 08/24/22  4:16 AM   Result Value Ref Range    WBC 10.4 4.5 - 11.5 x10(3)/mcL    RBC 2.51 (L) 4.70 - 6.10 x10(6)/mcL    Hgb 7.9 (L) 14.0 - 18.0 gm/dL    Hct 24.8 (L) 42.0 - 52.0 %    MCV 98.8 (H) 80.0 - 94.0 fL    MCH 31.5 (H) 27.0 - 31.0 pg    MCHC 31.9 (L) 33.0 - 36.0 mg/dL    RDW 13.2 11.5 - 17.0 %    Platelet 226 130 - 400 x10(3)/mcL    MPV 9.6 7.4 - 10.4 fL    Neut % 80.2 %    Lymph % 9.5 %    Mono % 8.6 %    Eos % 0.5 %    Basophil % 0.4 %    Lymph # 0.99 0.6 - 4.6 x10(3)/mcL    Neut # 8.4 2.1 - 9.2 x10(3)/mcL    Mono # 0.90 0.1 - 1.3 x10(3)/mcL    Eos # 0.05 0 - 0.9 x10(3)/mcL    Baso # 0.04 0 - 0.2 x10(3)/mcL    IG# 0.08 (H) 0 - 0.04 x10(3)/mcL    IG% 0.8 %    NRBC% 0.0 %   Basic Metabolic Panel    Collection Time: 08/25/22  7:54 AM   Result Value Ref Range    Sodium Level 139 136 - 145 mmol/L    Potassium Level 4.6 3.5 - 5.1 mmol/L    Chloride 74 (LL) 98 - 107 mmol/L    Carbon Dioxide 50 (HH) 22 - 29 mmol/L    Glucose Level 152 (H) 74 - 100 mg/dL    Blood Urea Nitrogen 23.3 8.4 - 25.7 mg/dL    Creatinine 0.68 (L) 0.73 - 1.18 mg/dL    BUN/Creatinine Ratio 34     Calcium Level Total 9.9 8.4 - 10.2 mg/dL    Anion Gap 15.0 mEq/L    eGFR >60 mls/min/1.73/m2   CBC with Differential    Collection Time: 08/25/22  7:54 AM   Result Value Ref Range    WBC 10.3 4.5 - 11.5 x10(3)/mcL    RBC 2.71 (L) 4.70 - 6.10 x10(6)/mcL    Hgb 8.6 (L) 14.0 - 18.0 gm/dL    Hct 27.6 (L) 42.0 - 52.0 %    .8 (H) 80.0 - 94.0 fL    MCH 31.7 (H) 27.0 - 31.0 pg    MCHC 31.2 (L) 33.0 - 36.0 mg/dL    RDW 13.2 11.5 - 17.0 %    Platelet 280 130 - 400 x10(3)/mcL    MPV  10.6 (H) 7.4 - 10.4 fL    Neut % 83.0 %    Lymph % 8.5 %    Mono % 7.7 %    Eos % 0.3 %    Basophil % 0.2 %    Lymph # 0.87 0.6 - 4.6 x10(3)/mcL    Neut # 8.5 2.1 - 9.2 x10(3)/mcL    Mono # 0.79 0.1 - 1.3 x10(3)/mcL    Eos # 0.03 0 - 0.9 x10(3)/mcL    Baso # 0.02 0 - 0.2 x10(3)/mcL    IG# 0.03 0 - 0.04 x10(3)/mcL    IG% 0.3 %    NRBC% 0.0 %       Significant Imaging:  Imaging Results          CTA Chest Abdomen Non Coronary (Final result)  Result time 08/13/22 15:55:52   Procedure changed from CTA Chest Non-Coronary (PE Study)     Final result by Basilio De Oliveira MD (08/13/22 15:55:52)                 Impression:      Left lower lobe pulmonary thromboembolism is noted.    Large right pleural effusion is present.    Findings of anasarca.    The bladder wall is prominent.  Correlate with urinalysis.    There are cortical defects of the bilateral kidneys likely related to chronic renal disease and scarring, however infarcts are less likely.    The liver appears heterogeneous which may be related to phase of contrast however is cirrhosis is not excluded.  Cardiomegaly is present.  Further evaluation may be obtained with ECHO.    Findings reported to Dr. Little prior to interpretation.      Electronically signed by: Basilio De Oliveira  Date:    08/13/2022  Time:    15:55             Narrative:    EXAMINATION:  CTA CHEST ABDOMEN NON CORONARY (XPD)    CLINICAL HISTORY:  Pulmonary embolism (PE) suspected, positive D-dimer;    TECHNIQUE:  Axial CTA images of the chest, abdomen, and pelvis were obtained With Contrast. Sagittal and coronal reconstructed images were available for review.    Automatic exposure control was utilized to reduce the patient's radiation dose.    DLP = 582    COMPARISON:  No prior images available for comparison.    FINDINGS:  PULMONARY ARTERY: Thrombus is identified in the left lower lobe pulmonary artery.    AORTA: The thoracoabdominal aorta is normal in course and caliber. Scattered atherosclerotic  disease is noted.    HEART: The heart is enlarged.  No pericardial effusion.    THYROID GLAND: The thyroid is not enlarged. There are no nodules identified.    AIRWAYS: Trachea is midline and tracheobronchial tree is patent.    LUNGS: Large right effusion with subsegmental atelectatic changes at the right base.  Emphysematous changes throughout the lungs.    THROACIC LYMPH NODES: There is no significant mediastinal, axillary or hilar lymphadenopathy.    HEPATOBILIARY: Somewhat nodular contour of the superior aspect of the liver with some heterogeneity may be related to phase of contrast versus cirrhosis.  Correlate with patient's history.  The gallbladder is normal.    SPLEEN: Normal    PANCREAS: No focal masses or ductal dilatation.    ADRENALS: No adrenal nodules.    KIDNEYS: No evidence of hydronephrosis.  Bilateral renal cortical perfusional defects likely related to scarring in chronic kidney disease.  Correlate with patient's history.  Less likely infarcts.    ABDOMINAL LYMPHADENOPATHY/RETROPERITONEUM: There is no retroperitoneal lymphadenopathy.    BOWEL: No acute bowel related abnormalities.    PELVIC VISCERA: The bladder wall is somewhat prominent.  Correlate with urinalysis.    PELVIC LYMPH NODES: No lymphadenopathy.    PERITONEUM/ BODY WALL: Diffuse body wall edema with moderate ascites noted.    SKELETAL: No aggressive appearing lytic/blastic lesion. No acute fractures, subluxations or dislocations.                               X-Ray Chest 1 View (Final result)  Result time 08/13/22 12:44:56    Final result by Basilio De Oliveira MD (08/13/22 12:44:56)                 Impression:      Right greater than left pleural effusion with possible right lower lobe opacification.  Underlying infectious process is not excluded.  Recommend continued follow-up.      Electronically signed by: Basilio De Oliveira  Date:    08/13/2022  Time:    12:44             Narrative:    EXAMINATION:  XR CHEST 1 VIEW    CLINICAL  HISTORY:  shortness of breath;    TECHNIQUE:  Single view of the chest    COMPARISON:  No prior imaging available for comparison.    FINDINGS:  Right greater than left pleural effusion with possible right lower lobe opacification.  Underlying infectious process is not excluded.  Recommend continued follow-up.                              Telemetry:          Physical Exam  Constitutional:       Appearance: He is ill-appearing.   HENT:      Mouth/Throat:      Mouth: Mucous membranes are dry.      Comments: Poor dentition  Cardiovascular:      Rate and Rhythm: Regular rhythm. Tachycardia present.      Heart sounds: Normal heart sounds.   Pulmonary:      Effort: Respiratory distress present.      Breath sounds: Rales present.      Comments: Diminished breath sounds to right lung fields    + hemoptysis  Abdominal:      Palpations: Abdomen is soft.   Musculoskeletal:      Right lower leg: Edema present.      Left lower leg: Edema present.   Skin:     General: Skin is warm.   Neurological:      Mental Status: He is alert.   Psychiatric:         Mood and Affect: Mood is anxious.         Current Inpatient Medications:    Current Facility-Administered Medications:     acetaminophen tablet 650 mg, 650 mg, Oral, Q4H PRN, Reginald Barker MD, 650 mg at 08/20/22 1311    albuterol-ipratropium 2.5 mg-0.5 mg/3 mL nebulizer solution 3 mL, 3 mL, Nebulization, Q4H PRN, Reginald Barker MD    aspirin chewable tablet 81 mg, 81 mg, Oral, Daily, ART De Oliveira, 81 mg at 08/25/22 0840    atorvastatin tablet 40 mg, 40 mg, Oral, QHS, Kwasi Thao MD, 40 mg at 08/24/22 2007    enoxaparin injection 50 mg, 1 mg/kg, Subcutaneous, Q12H, Harpreet Mancuso DO, 50 mg at 08/25/22 0838    famotidine tablet 20 mg, 20 mg, Oral, BID, Reginald Barker MD, 20 mg at 08/25/22 0839    furosemide injection 40 mg, 40 mg, Intravenous, Q12H, ART De Oliveira, 40 mg at 08/24/22 2209    melatonin tablet 6 mg, 6 mg, Oral, Nightly PRN, Reginald BOWLES  MD Mj    promethazine tablet 25 mg, 25 mg, Oral, Q6H PRN, Reginald Barker MD    sodium chloride 0.9% flush 10 mL, 10 mL, Intravenous, PRN, Yuni Little MD    sodium chloride 0.9% flush 10 mL, 10 mL, Intravenous, PRN, Reginald Barker MD    traZODone tablet 50 mg, 50 mg, Oral, Nightly PRN, Alan Daigle MD, 50 mg at 08/24/22 2007         VTE Risk Mitigation (From admission, onward)         Ordered     enoxaparin injection 50 mg  Every 12 hours (non-standard times)         08/25/22 0816     IP VTE HIGH RISK PATIENT  Once         08/13/22 1523     Place sequential compression device  Until discontinued         08/13/22 1523     Place GIACOMO hose  Until discontinued         08/13/22 1523                Assessment:   CMO, unspecified  --EF 15%  Acute combined systolic and diastolic HF  --EF 15%; Grade III LVDD  --BNP 3280  --s/p right thoracentesis with 1030 fluid removal (transudative)  NSTEMI, Type II s/t heart failure  --Troponin 0.134->0.120->0.105  Left BBB  LLL PE  Hemoptysis  Anemia  --worsening   Metabolic alkalosis  --worsening  ANNETTA  --resolved  COPD  Former smoker  No hx GIB    Plan:    Still overloaded and decompensates with minimal activity. Continue Lasix 40 mg IVP bid.   Ensure accurate I&O's/daily weights  Continue to hold Lovenox due to hemoptysis.   Patient has poor prognosis . Plans for transition to hospice    [facility] signing off. Reconsult if needed.     Cardiology  [facility] - 3rd Floor Medical Telemetry  08/25/2022

## 2022-08-25 NOTE — PROGRESS NOTES
[facility] - 3rd Floor Medical Telemetry  Pulmonary Critical Care Note    Patient Name: Harpreet Rodas  MRN: [id number]  Admission Date: 8/13/2022  Hospital Length of Stay: 12 days  Code Status: DNR  Attending Provider: Ana Rosa Fry MD  Primary Care Provider: Primary Doctor No     Subjective:     HPI:   This is a 56-year-old male who was admitted to [facility] on 08/13/2022 for complaints of worsening shortness of breath which has been ongoing over the last several months left-sided chest pain, and significant swelling of his right arm and bilateral feet.  Patient has history of drinking and smoking quit approximately 2 months ago prior to that was drinking 4-5 40 oz beer a weekly.  Workup revealed significantly elevated BNP of 3691.5, troponin 0.134, and CT imaging of the chest with a left lower lobe pulmonary thrombus, large right pleural effusion, and emphysematous changes throughout the lungs.  Pulmonary is being consulted for consideration of thoracentesis.  Patient was initiated on Lovenox b.i.d. secondary to pulmonary emboli. TTE obtained. Further conversation revealed patient had been experiencing difficulty breathing approximately 6 months ago after inhalation of a combination of (murratic acid, sulfuric acid, bleach) while at work as a . Has been utilizing albuterol inhaler x2-3/day. Tried Trilegy x14 days without much relief. Approximately 3 months ago he began experiencing swelling in his B/L lower extremities in addition to early satiety.  He denied any associated weight loss stating he has always been a very thin person or fever/night sweats.  Denies any prior blood clots in legs/arm/lungs or any known cancers.  Mother and brother both passed away from unspecified cancer.      Patient has been exposed to a variety of pulmonary irritants/carcinogenic agents as listed below;  - Tobacco Use (approximately x70 pack years; quit x3 months ago)  - Asbestos (Approximately 10-15  years)  - Sandblasting  - Glenn dust  - Harsh chemicals (Murratic Acid, Sulfuric Acid, Bleach; without appropriate respirator use)    Hospital Course/Significant events:  - Thoracentesis performed (22); patient tolerated well              -- 1030 cc straw colored fluid removed              -- Pending pleural fluid studies for further diagnostics              -- Post-procedural CXR (x2) w/ right-sided trapped lung  - Continue FD Lovenox in setting of left pulmonary embolism  - HIV/Hepatitis Panel/Syphillis (all resulted negative)  - B/L LE & RUE U/S NIVA (negative)  - TTE w/ EF of 15% w/ diastolic dysfunction    24 Hour Interval History:  Patient remains decompensated this morning and is on oxymask 5L. Hospice is coming to speak with patient and pallitive care is on board. Patient will likely go home Monday with hospice once O2 requirements can be met at home. Patient remains DNR. Lovenox was discontinued given hemoptysis, however this is due to the PE, will restart today. 24 Hour volume +380cc.     Past Medical History:   Diagnosis Date    COPD (chronic obstructive pulmonary disease)        Past Surgical History:   Procedure Laterality Date    MANDIBLE FRACTURE SURGERY         Social History     Socioeconomic History    Marital status:    Tobacco Use    Smoking status: Former Smoker     Packs/day: 2.00     Quit date: 2022     Years since quittin.1    Smokeless tobacco: Never Used   Substance and Sexual Activity    Alcohol use: Not Currently     Comment: quit 2 months ago    Drug use: Never       Current Outpatient Medications   Medication Instructions    albuterol sulfate (INV ALBUTEROL) 90 mcg inhalation Inhalation, As needed (PRN), Take one puff by mouth as directed by Physician.       Current Inpatient Medications   aspirin  81 mg Oral Daily    atorvastatin  40 mg Oral QHS    famotidine  20 mg Oral BID    furosemide (LASIX) injection  40 mg Intravenous Q12H       Review of  Systems   All other systems reviewed and are negative.        Objective:       Intake/Output Summary (Last 24 hours) at 8/25/2022 0745  Last data filed at 8/24/2022 2200  Gross per 24 hour   Intake 780 ml   Output 400 ml   Net 380 ml         Vital Signs (Most Recent):  Temp: 97.7 °F (36.5 °C) (08/25/22 0731)  Pulse: 104 (08/25/22 0731)  Resp: (!) 22 (08/25/22 0731)  BP: 106/72 (08/25/22 0731)  SpO2: 98 % (08/25/22 0731)  Body mass index is 16.85 kg/m².  Weight: 54.8 kg (120 lb 13 oz) Vital Signs (24h Range):  Temp:  [97.7 °F (36.5 °C)-98.7 °F (37.1 °C)] 97.7 °F (36.5 °C)  Pulse:  [104-113] 104  Resp:  [18-28] 22  SpO2:  [90 %-100 %] 98 %  BP: (106-128)/(65-81) 106/72     Physical Exam  Constitutional:       General: He is not in acute distress.     Appearance: He is ill-appearing. He is not toxic-appearing.      Comments: Hypersomnolence, cachetic gentleman   HENT:      Head: Normocephalic and atraumatic.      Mouth/Throat:      Mouth: Mucous membranes are moist.      Pharynx: Oropharynx is clear. No oropharyngeal exudate or posterior oropharyngeal erythema.   Eyes:      General: No scleral icterus.  Cardiovascular:      Rate and Rhythm: Normal rate and regular rhythm.      Heart sounds: Normal heart sounds.   Pulmonary:      Effort: Respiratory distress present.      Breath sounds: Decreased breath sounds, wheezing and rhonchi present.   Musculoskeletal:      Right lower leg: Edema present.      Left lower leg: Edema present.      Comments: 1+ edema to BLE; Right arm with significant edema-  improving.   Neurological:      General: No focal deficit present.      Mental Status: He is alert and oriented to person, place, and time.   Psychiatric:         Mood and Affect: Mood normal.         Behavior: Behavior normal.       Lines/Drains/Airways     Peripheral Intravenous Line  Duration                Peripheral IV - Single Lumen 08/14/22 2000 Left;Posterior Forearm 10 days                Significant Labs:    Lab  Results   Component Value Date    WBC 10.4 08/24/2022    HGB 7.9 (L) 08/24/2022    HCT 24.8 (L) 08/24/2022    MCV 98.8 (H) 08/24/2022     08/24/2022   BNP of 3691.5, troponin 0.134      BMP  Lab Results   Component Value Date     08/24/2022    K 3.8 08/24/2022    CO2 49 (HH) 08/24/2022    BUN 21.7 08/24/2022    CREATININE 0.61 (L) 08/24/2022    CALCIUM 9.4 08/24/2022   Magnesium 1.5, phosphorus 5.7    ABG  Recent Labs   Lab 08/23/22  1034   PH 7.40   PO2 93   PCO2 105*   HCO3 65.0*       Significant Imaging:  X-Ray Chest 1 View  Narrative: EXAMINATION:  XR CHEST 1 VIEW    CLINICAL HISTORY:  shortness of breath, hx of effusions;  Heart failure, unspecified    TECHNIQUE:  Single view of the chest    COMPARISON:  08/23/2022    FINDINGS:  Moderate right pleural effusion is similar to prior.  Cardiomegaly is unchanged.  Impression: Moderate right pleural effusion is similar to prior. Cardiomegaly is unchanged.    Electronically signed by: Basilio De Oliveira  Date:    08/24/2022  Time:    11:49      Assessment/Plan:     Assessment  1. Left Lower Lobe Pulmonary Embolism  2. Large Right-sided pleural effusion w/ Atelectasis - improved s/p thoracentesis (8/17/22)  3. HFrEF (EF of 15%, 8/2022) w/ elevated BNP  4. Compensated Respiratory Acidosis w/ contraction alkalosis  5. RUE Edema  6. Hx of COPD (unquantified)  7. History of tobacco and alcohol abuse  8. Hx of Multiple Pulmonary Irritants as listed in above HPI  9. Cachectic w/ x3 months early satiety  10. Hypercapnia - Compensated     Plan  - Pleural fluid study results from thoracentesis on 8/17/22 shows transudate effusion  - Continue FD Lovenox in setting of left pulmonary embolism, monitor for further episodes of hematemesis  -Plan for possible palliative thoracentesis if shortness of breath worsens   - Sputum culture shows normal patricia   - CT abd/pelvis negative for signs of malignancy    - Continue diuresis as tolerated; currently Lasix 20mg Q12h;  recommendations per cardiology  - Volume status is +380cc last 24 hours  -Avoid over oxygenating patient is at risk of killing resp drive  - Discontinued ativan and sedating medications   - Spoke with patient about CODE Status and he would like to continue DNR  -Consulted palliative care, appreciate their assistance   -Restarted Lovenox 1mg/kg BID, hemoptysis is from PE, please do not discontinue  - Patient's prognosis remains guarded     Thank you for the consult, we will sign off. Please call with questions.     Harpreet Mancuso, DO   Pulmonary Critical Care Medicine

## 2022-08-25 NOTE — PROGRESS NOTES
IV and tele d/c'luis armando Nazario from hospice notified AA that equipment was delivered and okay to  pt. Jackie with hospice was also notified pt is being discharged. Pt being transported via ambulance for home with hospice.

## 2022-08-25 NOTE — PLAN OF CARE
Pt wants to dc today with [facility]. Aravind met with pt and will set up dme. Pt will require [facility] transport. Nurse and MD notified of pts decision.

## 2022-08-25 NOTE — DISCHARGE SUMMARY
[facility] Hospital Medicine Discharge Summary    Admit Date: 8/13/2022  Discharge Date and Time: 8/25/20224:54 PM  Admitting Physician: ALEIDA Team  Discharging Physician: Ana Rosa Fry MD.  Primary Care Physician: Primary Doctor No      Discharge Diagnoses:  Acute decompensated systolic heart failure with ejection fraction-15%   Volume overload/anasarca   Acute pulmonary embolism   On and off hemoptysis   Severe COPD/emphysema with acute exacerbation   Heavy alcohol abuse   Chronic tobacco abuse       Hospital Course:   56-year-old male with significant history of severe COPD with emphysema, heavy smoking/alcohol abuse initially presented to outlying facility with worsening dyspnea, orthopnea, peripheral edema, diarrhea, chest pain.  Patient was significantly hypoxemic and initially required BiPAP.  Lab significant for renal insufficiency, elevated D-dimer, elevated troponins.  Chest x-ray revealed bilateral pleural effusion along with bilateral opacification more on right side.  CT angiogram revealed left lower lobe PE, possible anasarca.  Further workup revealed severe cardiomyopathy with ejection fraction-15%, diastolic dysfunction, right atrial and right ventricular enlargement.  Patient was admitted hospitalist medicine service.  Placed on diuretics.  Cardiology consulted for new onset heart failure.  Pulmonology team also consulted given severe COPD.  Anticoagulation for PE.  Even after diuresis patient still continued to be overloaded and even decompensated with minimal activity.  Edema also worsening.  Lasix doses adjusted.  Patient developed hemoptysis and between and had to hold Lovenox which was later resumed since hemoptysis got better.  Given extremely poor prognosis palliative care team was consulted.  Hospice/comfort measures were discussed with the patient initially denied.  I assumed care of the patient on 08/25.  On 08/25 patient stated to be in that he wanted to go  home with hospice and wanted to continue only comfort measures.  Case management was consulted and this was arranged.  Patient discharged home with home hospice.  Discharge medications per med rec    Vitals:  VITAL SIGNS: 24 HRS MIN & MAX LAST   Temp  Min: 97.6 °F (36.4 °C)  Max: 97.9 °F (36.6 °C) 97.6 °F (36.4 °C)   BP  Min: 106/69  Max: 128/81 112/76   Pulse  Min: 86  Max: 112  86   Resp  Min: 18  Max: 22 19   SpO2  Min: 93 %  Max: 100 % 98 %       Physical Exam:  General appearance:  Mild acute distress   HENT: Atraumatic   Lungs:  Bilateral expiratory wheezes, breath sounds diminished   Heart: RRR,No edema  Abdomen: Soft, non tender   Extremities: warm  Neuro:  Awake, alert, oriented x4  Psych/mental status: Appropriate mood and affect.      Procedures Performed: No admission procedures for hospital encounter.     Significant Diagnostic Studies: See Full reports for all details    Recent Labs   Lab 08/23/22  0715 08/24/22  0416 08/25/22  0754   WBC 10.9 10.4 10.3   RBC 2.63* 2.51* 2.71*   HGB 7.9* 7.9* 8.6*   HCT 26.6* 24.8* 27.6*   .1* 98.8* 101.8*   MCH 30.0 31.5* 31.7*   MCHC 29.7* 31.9* 31.2*   RDW 13.3 13.2 13.2    226 280   MPV 10.3 9.6 10.6*       Recent Labs   Lab 08/19/22  0647 08/20/22  0905 08/21/22  1505 08/22/22  0644 08/23/22  0715 08/23/22  1034 08/24/22  0416 08/25/22  0754 08/25/22  0944      < >  --    < > 137  --  140 139 139   K 3.6   < >  --    < > 3.8  --  3.8 4.6 4.0   CO2 43*   < >  --    < > 46*  --  49* 50* 51*   BUN 14.2   < >  --    < > 16.2  --  21.7 23.3 24.0   CREATININE 0.64*   < >  --    < > 0.56*  --  0.61* 0.68* 0.63*   CALCIUM 8.3*   < >  --    < > 9.4  --  9.4 9.9 9.2   PH  --   --  7.36  --   --  7.40  --   --   --    ALBUMIN 2.8*  --   --   --  2.8*  --   --   --   --    ALKPHOS 93  --   --   --  101  --   --   --   --    ALT 33  --   --   --  34  --   --   --   --    AST 45*  --   --   --  43*  --   --   --   --    BILITOT 1.0  --   --   --  0.5  --    --   --   --     < > = values in this interval not displayed.        Microbiology Results (last 7 days)       Procedure Component Value Units Date/Time    Stool Culture **CANNOT BE ORDERED STAT** [[id number]]  (Normal) Collected: 08/23/22 0500    Order Status: Completed Specimen: Stool Updated: 08/25/22 1020     Stool Culture Negative for Salmonella, Shigella, Campylobacter, Vibrio, Aeromonas, Pleisiomonas,Yersinia, or Shiga Toxin 1 and 2.    Respiratory Culture [[id number]]  (Abnormal) Collected: 08/22/22 1705    Order Status: Completed Specimen: Sputum Updated: 08/24/22 0953     Respiratory Culture Normal respiratory patricia     GRAM STAIN Quality 3+      Moderate Gram positive cocci      Moderate Gram Negative Rods      Few Gram negative diplococci    Body Fluid Culture [[id number]] Collected: 08/17/22 1052    Order Status: Completed Specimen: Body Fluid from Thoracentesis Fluid Updated: 08/22/22 0901     Body Fluid Culture Final Report: At 5 days. No growth    Blood Culture [[id number]]  (Normal) Collected: 08/13/22 1314    Order Status: Completed Specimen: Blood from Arm, Left Updated: 08/18/22 2102     CULTURE, BLOOD ([facility]) No Growth at 5 days    Blood Culture [[id number]]  (Normal) Collected: 08/13/22 1314    Order Status: Completed Specimen: Blood from Hand, Left Updated: 08/18/22 2102     CULTURE, BLOOD ([facility]) No Growth at 5 days             X-Ray Chest 1 View  Narrative: EXAMINATION:  XR CHEST 1 VIEW    CLINICAL HISTORY:  shortness of breath, hx of effusions;  Heart failure, unspecified    TECHNIQUE:  Single view of the chest    COMPARISON:  08/23/2022    FINDINGS:  Moderate right pleural effusion is similar to prior.  Cardiomegaly is unchanged.  Impression: Moderate right pleural effusion is similar to prior. Cardiomegaly is unchanged.    Electronically signed by: Basilio De Oliveira  Date:    08/24/2022  Time:    11:49         Medication List        START taking these medications      albuterol-ipratropium 2.5 mg-0.5  mg/3 mL nebulizer solution  Commonly known as: DUO-NEB  Take 3 mLs by nebulization every 4 (four) hours as needed for Wheezing or Shortness of Breath. Rescue     apixaban 5 mg Tab  Commonly known as: ELIQUIS  Take 1 tablet (5 mg total) by mouth 2 (two) times daily.     aspirin 81 MG Chew  Take 1 tablet (81 mg total) by mouth once daily.  Start taking on: August 26, 2022     atorvastatin 40 MG tablet  Commonly known as: LIPITOR  Take 1 tablet (40 mg total) by mouth every evening.     famotidine 20 MG tablet  Commonly known as: PEPCID  Take 1 tablet (20 mg total) by mouth 2 (two) times daily.     furosemide 40 MG tablet  Commonly known as: LASIX  Take 1 tablet (40 mg total) by mouth 2 (two) times daily.            CONTINUE taking these medications      INV albuterol 90 mcg inhalation               Where to Get Your Medications        These medications were sent to [facility] - [address]  [address] [place] [address] [facility] [address]      Phone: [phone]   albuterol-ipratropium 2.5 mg-0.5 mg/3 mL nebulizer solution  apixaban 5 mg Tab  aspirin 81 MG Chew  atorvastatin 40 MG tablet  famotidine 20 MG tablet  furosemide 40 MG tablet          Explained in detail to the patient about the discharge plan, medications, and follow-up visits. Pt understands and agrees with the treatment plan  Discharge Disposition:     Discharged Condition: stable  Diet-   Dietary Orders (From admission, onward)       Start     Ordered    08/23/22 0903  Dietary nutrition supplements Boost Glucose Control Strawberry; All Meals  Continuous        Question Answer Comment   Select PO Supplement: Boost Glucose Control Strawberry    Frequency: All Meals        08/23/22 0903    08/13/22 1523  Diet heart healthy  (Diet/Nutrition [facility])  Diet effective now         08/13/22 1523                   Medications Per [place] med rec  Activities as tolerated    For further questions contact  hospitalist office    Discharge time 33 minutes    For worsening symptoms, chest pain, shortness of breath, increased abdominal pain, high grade fever, stroke or stroke like symptoms, immediately go to the nearest Emergency Room or call 911 as soon as possible.      Ana Rosa Paredes M.D, on 8/25/2022. at 4:54 PM.

## 2022-09-20 LAB — FUNGUS SPEC CULT: NORMAL

## 2022-09-30 LAB — MYCOBACTERIUM SPEC QL CULT: NORMAL

## 2024-05-06 NOTE — ED TRIAGE NOTES
Pt on documented O2, no respiratory distress noted. Will continue to monitor.     Pt to er via aasi with c/o sob and swelling to extremities.